# Patient Record
Sex: FEMALE | Race: WHITE | NOT HISPANIC OR LATINO | Employment: OTHER | URBAN - METROPOLITAN AREA
[De-identification: names, ages, dates, MRNs, and addresses within clinical notes are randomized per-mention and may not be internally consistent; named-entity substitution may affect disease eponyms.]

---

## 2023-04-11 ENCOUNTER — OFFICE VISIT (OUTPATIENT)
Dept: PRIMARY CARE | Facility: CLINIC | Age: 88
End: 2023-04-11
Payer: COMMERCIAL

## 2023-04-11 VITALS
HEIGHT: 59 IN | DIASTOLIC BLOOD PRESSURE: 68 MMHG | TEMPERATURE: 98.3 F | SYSTOLIC BLOOD PRESSURE: 138 MMHG | HEART RATE: 74 BPM | RESPIRATION RATE: 20 BRPM | OXYGEN SATURATION: 95 % | WEIGHT: 141 LBS | BODY MASS INDEX: 28.43 KG/M2

## 2023-04-11 DIAGNOSIS — J30.2 SEASONAL ALLERGIC RHINITIS, UNSPECIFIED TRIGGER: Primary | ICD-10-CM

## 2023-04-11 DIAGNOSIS — L29.9 PRURITIC CONDITION: ICD-10-CM

## 2023-04-11 DIAGNOSIS — E11.59 HYPERTENSION ASSOCIATED WITH TYPE 2 DIABETES MELLITUS (MULTI): ICD-10-CM

## 2023-04-11 DIAGNOSIS — I15.2 HYPERTENSION ASSOCIATED WITH TYPE 2 DIABETES MELLITUS (MULTI): ICD-10-CM

## 2023-04-11 PROBLEM — H35.3211 WET AGE-RELATED MACULAR DEGENERATION OF RIGHT EYE WITH ACTIVE CHOROIDAL NEOVASCULARIZATION (MULTI): Status: ACTIVE | Noted: 2023-04-11

## 2023-04-11 PROBLEM — M19.049 ARTHRITIS OF FINGER: Status: ACTIVE | Noted: 2023-04-11

## 2023-04-11 PROBLEM — M25.572 ANKLE PAIN, LEFT: Status: ACTIVE | Noted: 2023-04-11

## 2023-04-11 PROBLEM — D64.9 ANEMIA: Status: ACTIVE | Noted: 2023-04-11

## 2023-04-11 PROBLEM — R19.8 CHANGE IN BOWEL MOVEMENT: Status: ACTIVE | Noted: 2023-04-11

## 2023-04-11 PROBLEM — H35.30 AGE-RELATED MACULAR DEGENERATION: Status: ACTIVE | Noted: 2023-04-11

## 2023-04-11 PROBLEM — H25.012 CORTICAL AGE-RELATED CATARACT OF LEFT EYE: Status: ACTIVE | Noted: 2023-04-11

## 2023-04-11 PROBLEM — H92.09 EAR PAIN: Status: ACTIVE | Noted: 2023-04-11

## 2023-04-11 PROBLEM — H43.22 ASTEROID HYALOSIS OF LEFT EYE: Status: ACTIVE | Noted: 2023-04-11

## 2023-04-11 PROBLEM — E55.9 VITAMIN D DEFICIENCY: Status: ACTIVE | Noted: 2023-04-11

## 2023-04-11 PROBLEM — E04.2 NONTOXIC MULTINODULAR GOITER: Status: ACTIVE | Noted: 2023-04-11

## 2023-04-11 PROBLEM — R73.9 HYPERGLYCEMIA: Status: ACTIVE | Noted: 2023-04-11

## 2023-04-11 PROBLEM — H61.23 IMPACTED CERUMEN OF BOTH EARS: Status: ACTIVE | Noted: 2023-04-11

## 2023-04-11 PROBLEM — L30.9 DERMATITIS OF EXTERNAL EAR: Status: ACTIVE | Noted: 2023-04-11

## 2023-04-11 PROBLEM — R10.9 ABDOMINAL PAIN OF MULTIPLE SITES: Status: ACTIVE | Noted: 2023-04-11

## 2023-04-11 PROBLEM — E83.52 HYPERCALCEMIA: Status: ACTIVE | Noted: 2023-04-11

## 2023-04-11 PROBLEM — Z96.1 PSEUDOPHAKIA OF BOTH EYES: Status: ACTIVE | Noted: 2023-04-11

## 2023-04-11 PROBLEM — K80.20 CHOLELITHIASIS: Status: ACTIVE | Noted: 2023-04-11

## 2023-04-11 PROBLEM — H04.123 DRY EYES, BILATERAL: Status: ACTIVE | Noted: 2023-04-11

## 2023-04-11 PROBLEM — M35.3 POLYMYALGIA RHEUMATICA (MULTI): Status: ACTIVE | Noted: 2023-04-11

## 2023-04-11 PROBLEM — J20.9 ACUTE BRONCHITIS WITH BRONCHOSPASM: Status: ACTIVE | Noted: 2023-04-11

## 2023-04-11 PROBLEM — H52.4 BILATERAL PRESBYOPIA: Status: ACTIVE | Noted: 2023-04-11

## 2023-04-11 PROBLEM — I50.32 CHRONIC DIASTOLIC CONGESTIVE HEART FAILURE (MULTI): Status: ACTIVE | Noted: 2023-04-11

## 2023-04-11 PROBLEM — R31.9 HEMATURIA: Status: ACTIVE | Noted: 2023-04-11

## 2023-04-11 PROBLEM — R60.9 EDEMA: Status: ACTIVE | Noted: 2023-04-11

## 2023-04-11 PROBLEM — H02.831 DERMATOCHALASIS OF BOTH UPPER EYELIDS: Status: ACTIVE | Noted: 2023-04-11

## 2023-04-11 PROBLEM — H26.491 PCO (POSTERIOR CAPSULAR OPACIFICATION), RIGHT: Status: ACTIVE | Noted: 2023-04-11

## 2023-04-11 PROBLEM — H25.11 AGE-RELATED NUCLEAR CATARACT, RIGHT: Status: ACTIVE | Noted: 2023-04-11

## 2023-04-11 PROBLEM — G89.4 CHRONIC PAIN SYNDROME: Status: ACTIVE | Noted: 2023-04-11

## 2023-04-11 PROBLEM — H52.203 ASTIGMATISM OF BOTH EYES WITH PRESBYOPIA: Status: ACTIVE | Noted: 2023-04-11

## 2023-04-11 PROBLEM — E21.0 PRIMARY HYPERPARATHYROIDISM (MULTI): Status: ACTIVE | Noted: 2023-04-11

## 2023-04-11 PROBLEM — M79.7 FIBROMYALGIA: Status: ACTIVE | Noted: 2023-04-11

## 2023-04-11 PROBLEM — I10 HYPERTENSION: Status: ACTIVE | Noted: 2023-04-11

## 2023-04-11 PROBLEM — M81.0 OSTEOPOROSIS: Status: ACTIVE | Noted: 2023-04-11

## 2023-04-11 PROBLEM — I49.9 ARRHYTHMIA: Status: ACTIVE | Noted: 2023-04-11

## 2023-04-11 PROBLEM — R63.0 APPETITE LOSS: Status: ACTIVE | Noted: 2023-04-11

## 2023-04-11 PROBLEM — D50.9 ANEMIA, IRON DEFICIENCY: Status: ACTIVE | Noted: 2023-04-11

## 2023-04-11 PROBLEM — J01.90 ACUTE SINUS INFECTION: Status: ACTIVE | Noted: 2023-04-11

## 2023-04-11 PROBLEM — H60.311 ACUTE DIFFUSE OTITIS EXTERNA OF RIGHT EAR: Status: ACTIVE | Noted: 2023-04-11

## 2023-04-11 PROBLEM — H25.12 AGE-RELATED NUCLEAR CATARACT, LEFT: Status: ACTIVE | Noted: 2023-04-11

## 2023-04-11 PROBLEM — M79.642 PAIN OF LEFT HAND: Status: ACTIVE | Noted: 2023-04-11

## 2023-04-11 PROBLEM — H52.4 ASTIGMATISM OF BOTH EYES WITH PRESBYOPIA: Status: ACTIVE | Noted: 2023-04-11

## 2023-04-11 PROBLEM — H26.492 PCO (POSTERIOR CAPSULAR OPACIFICATION), LEFT: Status: ACTIVE | Noted: 2023-04-11

## 2023-04-11 PROBLEM — K44.9 HIATAL HERNIA: Status: ACTIVE | Noted: 2023-04-11

## 2023-04-11 PROBLEM — H25.011 CORTICAL AGE-RELATED CATARACT OF RIGHT EYE: Status: ACTIVE | Noted: 2023-04-11

## 2023-04-11 PROBLEM — E66.9 OBESITY (BMI 30-39.9): Status: ACTIVE | Noted: 2023-04-11

## 2023-04-11 PROBLEM — S63.201A: Status: ACTIVE | Noted: 2023-04-11

## 2023-04-11 PROBLEM — K86.2 PANCREATIC CYST (HHS-HCC): Status: ACTIVE | Noted: 2023-04-11

## 2023-04-11 PROBLEM — H02.834 DERMATOCHALASIS OF BOTH UPPER EYELIDS: Status: ACTIVE | Noted: 2023-04-11

## 2023-04-11 PROBLEM — I48.91 ATRIAL FIBRILLATION (MULTI): Status: ACTIVE | Noted: 2023-04-11

## 2023-04-11 PROBLEM — E78.5 HYPERLIPIDEMIA: Status: ACTIVE | Noted: 2023-04-11

## 2023-04-11 PROBLEM — H35.3134 ADVANCED ATROPHIC NONEXUDATIVE AGE-RELATED MACULAR DEGENERATION OF BOTH EYES WITH SUBFOVEAL INVOLVEMENT: Status: ACTIVE | Noted: 2023-04-11

## 2023-04-11 PROBLEM — H04.209 EYE TEARING: Status: ACTIVE | Noted: 2023-04-11

## 2023-04-11 PROCEDURE — 1036F TOBACCO NON-USER: CPT | Performed by: FAMILY MEDICINE

## 2023-04-11 PROCEDURE — 3078F DIAST BP <80 MM HG: CPT | Performed by: FAMILY MEDICINE

## 2023-04-11 PROCEDURE — 3075F SYST BP GE 130 - 139MM HG: CPT | Performed by: FAMILY MEDICINE

## 2023-04-11 PROCEDURE — 99213 OFFICE O/P EST LOW 20 MIN: CPT | Performed by: FAMILY MEDICINE

## 2023-04-11 PROCEDURE — 96372 THER/PROPH/DIAG INJ SC/IM: CPT | Performed by: FAMILY MEDICINE

## 2023-04-11 PROCEDURE — 1159F MED LIST DOCD IN RCRD: CPT | Performed by: FAMILY MEDICINE

## 2023-04-11 PROCEDURE — 1125F AMNT PAIN NOTED PAIN PRSNT: CPT | Performed by: FAMILY MEDICINE

## 2023-04-11 PROCEDURE — 1160F RVW MEDS BY RX/DR IN RCRD: CPT | Performed by: FAMILY MEDICINE

## 2023-04-11 PROCEDURE — 1157F ADVNC CARE PLAN IN RCRD: CPT | Performed by: FAMILY MEDICINE

## 2023-04-11 RX ORDER — TRIAMCINOLONE ACETONIDE 40 MG/ML
40 INJECTION, SUSPENSION INTRA-ARTICULAR; INTRAMUSCULAR ONCE
Status: COMPLETED | OUTPATIENT
Start: 2023-04-11 | End: 2023-04-11

## 2023-04-11 RX ORDER — VITS A,C,E/LUTEIN/MINERALS 300MCG-200
1 TABLET ORAL DAILY
COMMUNITY

## 2023-04-11 RX ORDER — ROSUVASTATIN CALCIUM 5 MG/1
1 TABLET, COATED ORAL DAILY
COMMUNITY
Start: 2013-11-05 | End: 2023-12-12 | Stop reason: SDUPTHER

## 2023-04-11 RX ORDER — VIT C/E/ZN/COPPR/LUTEIN/ZEAXAN 250MG-90MG
1 CAPSULE ORAL DAILY
COMMUNITY
Start: 2014-11-11

## 2023-04-11 RX ORDER — RIVAROXABAN 15 MG/1
15 TABLET, FILM COATED ORAL
COMMUNITY
Start: 2014-05-14 | End: 2024-05-13

## 2023-04-11 RX ORDER — SPIRONOLACTONE 25 MG/1
1 TABLET ORAL DAILY
COMMUNITY
Start: 2018-04-17 | End: 2023-12-12 | Stop reason: SDUPTHER

## 2023-04-11 RX ORDER — METHYLPREDNISOLONE 4 MG/1
TABLET ORAL
Qty: 21 TABLET | Refills: 0 | Status: SHIPPED | OUTPATIENT
Start: 2023-04-12 | End: 2023-04-18

## 2023-04-11 RX ORDER — ACETAMINOPHEN 500 MG
2 TABLET ORAL DAILY PRN
COMMUNITY
Start: 2014-05-13 | End: 2024-05-28

## 2023-04-11 RX ORDER — OMEPRAZOLE 20 MG/1
1 CAPSULE, DELAYED RELEASE ORAL DAILY
COMMUNITY
End: 2023-10-23 | Stop reason: WASHOUT

## 2023-04-11 RX ORDER — AMLODIPINE BESYLATE 5 MG/1
1 TABLET ORAL DAILY
COMMUNITY
Start: 2015-09-18 | End: 2023-12-12 | Stop reason: ALTCHOICE

## 2023-04-11 RX ORDER — KETOTIFEN FUMARATE 0.35 MG/ML
SOLUTION/ DROPS OPHTHALMIC
COMMUNITY
Start: 2018-07-25

## 2023-04-11 RX ORDER — FUROSEMIDE 40 MG/1
1 TABLET ORAL 2 TIMES DAILY
COMMUNITY
Start: 2016-10-04 | End: 2023-11-29

## 2023-04-11 RX ADMIN — TRIAMCINOLONE ACETONIDE 40 MG: 40 INJECTION, SUSPENSION INTRA-ARTICULAR; INTRAMUSCULAR at 10:17

## 2023-04-11 ASSESSMENT — ENCOUNTER SYMPTOMS
ABDOMINAL DISTENTION: 0
SINUS PRESSURE: 1
DEPRESSION: 0
SHORTNESS OF BREATH: 0
CHILLS: 0
OCCASIONAL FEELINGS OF UNSTEADINESS: 0
MYALGIAS: 0
ABDOMINAL PAIN: 0
NAUSEA: 0
FEVER: 0
ARTHRALGIAS: 0
APPETITE CHANGE: 0
FATIGUE: 0
COUGH: 1
SORE THROAT: 0
DYSURIA: 0
DYSPHORIC MOOD: 0
WHEEZING: 0
ADENOPATHY: 0
HEADACHES: 0
DIFFICULTY URINATING: 0
LOSS OF SENSATION IN FEET: 0
CHEST TIGHTNESS: 0
NERVOUS/ANXIOUS: 0
BRUISES/BLEEDS EASILY: 0
SLEEP DISTURBANCE: 0
LIGHT-HEADEDNESS: 0
DIARRHEA: 0

## 2023-04-11 NOTE — PROGRESS NOTES
"Subjective   Patient ID: Aicha Lord is a 89 y.o. female who presents for Cough.    HPI     Review of Systems    Objective   /68   Pulse 74   Temp 36.8 °C (98.3 °F)   Resp 20   Ht 1.499 m (4' 11\")   Wt 64 kg (141 lb)   SpO2 95%   BMI 28.48 kg/m²     Physical Exam    Assessment/Plan          "

## 2023-04-11 NOTE — PROGRESS NOTES
"Subjective   Patient ID: Aicha Lord is a 89 y.o. female who presents for Cough.  Pt has sinus congestion, nasal drainage and productive cough . Onset was 11 days ago. Denies fever or SOB. Tested neg for COVID 2 days ago.    Pt reports symptoms are worsening.   Pt has not tried anything for treatment without improvement.   She has hx diabetes and last A1c 5.7 in December    Cough  Associated symptoms include postnasal drip. Pertinent negatives include no chest pain, chills, ear pain, fever, headaches, myalgias, rash, sore throat, shortness of breath or wheezing.       Review of Systems   Constitutional:  Negative for appetite change, chills, fatigue and fever.   HENT:  Positive for congestion, postnasal drip, sinus pressure and sneezing. Negative for ear pain and sore throat.    Eyes:  Negative for visual disturbance.   Respiratory:  Positive for cough. Negative for chest tightness, shortness of breath and wheezing.    Cardiovascular:  Negative for chest pain.   Gastrointestinal:  Negative for abdominal distention, abdominal pain, diarrhea and nausea.   Genitourinary:  Negative for difficulty urinating, dysuria and pelvic pain.   Musculoskeletal:  Negative for arthralgias and myalgias.   Skin:  Negative for rash.   Allergic/Immunologic: Negative for immunocompromised state.   Neurological:  Negative for light-headedness and headaches.   Hematological:  Negative for adenopathy. Does not bruise/bleed easily.   Psychiatric/Behavioral:  Negative for dysphoric mood and sleep disturbance. The patient is not nervous/anxious.        Objective   /68   Pulse 74   Temp 36.8 °C (98.3 °F)   Resp 20   Ht 1.499 m (4' 11\")   Wt 64 kg (141 lb)   SpO2 95%   BMI 28.48 kg/m²    Physical Exam  Constitutional:       General: She is not in acute distress.     Appearance: Normal appearance.   HENT:      Head: Normocephalic and atraumatic.      Right Ear: Tympanic membrane and ear canal normal.      Left Ear: Tympanic " membrane and ear canal normal.      Nose: Congestion and rhinorrhea present.      Mouth/Throat:      Mouth: Mucous membranes are moist.      Pharynx: No oropharyngeal exudate or posterior oropharyngeal erythema.   Eyes:      Conjunctiva/sclera: Conjunctivae normal.   Cardiovascular:      Rate and Rhythm: Normal rate and regular rhythm.      Heart sounds: Normal heart sounds. No murmur heard.  Pulmonary:      Effort: Pulmonary effort is normal.      Breath sounds: Normal breath sounds.   Abdominal:      Palpations: Abdomen is soft.      Tenderness: There is no abdominal tenderness.   Neurological:      Mental Status: She is alert.   Psychiatric:         Mood and Affect: Mood normal.         Judgment: Judgment normal.           Assessment/Plan   Diagnoses and all orders for this visit:  Seasonal allergic rhinitis, unspecified trigger - with severity of symptoms will treat with Depomedrol shot today then start Medrol dose dimitry tomorrow. Also try nasal saline (Ie. Sinus Rinse) every 1-2 hours as needed.  Hypertension associated with type 2 diabetes mellitus (CMS/[facility])

## 2023-06-08 ENCOUNTER — OFFICE VISIT (OUTPATIENT)
Dept: PRIMARY CARE | Facility: CLINIC | Age: 88
End: 2023-06-08
Payer: MEDICARE

## 2023-06-08 VITALS
HEART RATE: 72 BPM | HEIGHT: 59 IN | BODY MASS INDEX: 27.62 KG/M2 | RESPIRATION RATE: 16 BRPM | DIASTOLIC BLOOD PRESSURE: 74 MMHG | SYSTOLIC BLOOD PRESSURE: 118 MMHG | WEIGHT: 137 LBS | OXYGEN SATURATION: 99 %

## 2023-06-08 DIAGNOSIS — I15.2 HYPERTENSION ASSOCIATED WITH TYPE 2 DIABETES MELLITUS (MULTI): ICD-10-CM

## 2023-06-08 DIAGNOSIS — M79.7 FIBROMYALGIA: ICD-10-CM

## 2023-06-08 DIAGNOSIS — N18.30 TYPE 2 DIABETES MELLITUS WITH STAGE 3 CHRONIC KIDNEY DISEASE AND HYPERTENSION (MULTI): ICD-10-CM

## 2023-06-08 DIAGNOSIS — I12.9 TYPE 2 DIABETES MELLITUS WITH STAGE 3 CHRONIC KIDNEY DISEASE AND HYPERTENSION (MULTI): ICD-10-CM

## 2023-06-08 DIAGNOSIS — Z00.00 HEALTHCARE MAINTENANCE: Primary | ICD-10-CM

## 2023-06-08 DIAGNOSIS — E11.59 HYPERTENSION ASSOCIATED WITH TYPE 2 DIABETES MELLITUS (MULTI): ICD-10-CM

## 2023-06-08 DIAGNOSIS — D50.9 IRON DEFICIENCY ANEMIA, UNSPECIFIED IRON DEFICIENCY ANEMIA TYPE: ICD-10-CM

## 2023-06-08 DIAGNOSIS — I50.32 CHRONIC DIASTOLIC CONGESTIVE HEART FAILURE (MULTI): ICD-10-CM

## 2023-06-08 DIAGNOSIS — E04.2 NONTOXIC MULTINODULAR GOITER: ICD-10-CM

## 2023-06-08 DIAGNOSIS — N18.32 STAGE 3B CHRONIC KIDNEY DISEASE (MULTI): ICD-10-CM

## 2023-06-08 DIAGNOSIS — G89.4 CHRONIC PAIN SYNDROME: ICD-10-CM

## 2023-06-08 DIAGNOSIS — E78.2 MIXED HYPERLIPIDEMIA: ICD-10-CM

## 2023-06-08 DIAGNOSIS — E21.0 PRIMARY HYPERPARATHYROIDISM (MULTI): ICD-10-CM

## 2023-06-08 DIAGNOSIS — I10 PRIMARY HYPERTENSION: ICD-10-CM

## 2023-06-08 DIAGNOSIS — M35.3 POLYMYALGIA RHEUMATICA (MULTI): ICD-10-CM

## 2023-06-08 DIAGNOSIS — E11.22 TYPE 2 DIABETES MELLITUS WITH STAGE 3 CHRONIC KIDNEY DISEASE AND HYPERTENSION (MULTI): ICD-10-CM

## 2023-06-08 DIAGNOSIS — I48.11 LONGSTANDING PERSISTENT ATRIAL FIBRILLATION (MULTI): ICD-10-CM

## 2023-06-08 PROBLEM — I49.9 ARRHYTHMIA: Status: RESOLVED | Noted: 2023-04-11 | Resolved: 2023-06-08

## 2023-06-08 PROBLEM — H25.011 CORTICAL AGE-RELATED CATARACT OF RIGHT EYE: Status: RESOLVED | Noted: 2023-04-11 | Resolved: 2023-06-08

## 2023-06-08 PROBLEM — H25.12 AGE-RELATED NUCLEAR CATARACT, LEFT: Status: RESOLVED | Noted: 2023-04-11 | Resolved: 2023-06-08

## 2023-06-08 PROBLEM — R63.0 APPETITE LOSS: Status: RESOLVED | Noted: 2023-04-11 | Resolved: 2023-06-08

## 2023-06-08 PROBLEM — R00.2 PALPITATIONS: Status: ACTIVE | Noted: 2023-06-08

## 2023-06-08 PROBLEM — R19.8 CHANGE IN BOWEL MOVEMENT: Status: RESOLVED | Noted: 2023-04-11 | Resolved: 2023-06-08

## 2023-06-08 PROBLEM — R10.9 ABDOMINAL PAIN OF MULTIPLE SITES: Status: RESOLVED | Noted: 2023-04-11 | Resolved: 2023-06-08

## 2023-06-08 PROBLEM — H60.311 ACUTE DIFFUSE OTITIS EXTERNA OF RIGHT EAR: Status: RESOLVED | Noted: 2023-04-11 | Resolved: 2023-06-08

## 2023-06-08 PROBLEM — H92.09 EAR PAIN: Status: RESOLVED | Noted: 2023-04-11 | Resolved: 2023-06-08

## 2023-06-08 PROBLEM — M79.642 PAIN OF LEFT HAND: Status: RESOLVED | Noted: 2023-04-11 | Resolved: 2023-06-08

## 2023-06-08 PROBLEM — J20.9 ACUTE BRONCHITIS WITH BRONCHOSPASM: Status: RESOLVED | Noted: 2023-04-11 | Resolved: 2023-06-08

## 2023-06-08 PROBLEM — H25.012 CORTICAL AGE-RELATED CATARACT OF LEFT EYE: Status: RESOLVED | Noted: 2023-04-11 | Resolved: 2023-06-08

## 2023-06-08 PROBLEM — R60.9 EDEMA: Status: RESOLVED | Noted: 2023-04-11 | Resolved: 2023-06-08

## 2023-06-08 PROBLEM — H25.11 AGE-RELATED NUCLEAR CATARACT, RIGHT: Status: RESOLVED | Noted: 2023-04-11 | Resolved: 2023-06-08

## 2023-06-08 PROBLEM — R00.2 PALPITATIONS: Status: RESOLVED | Noted: 2023-06-08 | Resolved: 2023-06-08

## 2023-06-08 PROBLEM — L30.9 DERMATITIS OF EXTERNAL EAR: Status: RESOLVED | Noted: 2023-04-11 | Resolved: 2023-06-08

## 2023-06-08 PROBLEM — S63.201A: Status: RESOLVED | Noted: 2023-04-11 | Resolved: 2023-06-08

## 2023-06-08 PROBLEM — E66.9 OBESITY (BMI 30-39.9): Status: RESOLVED | Noted: 2023-04-11 | Resolved: 2023-06-08

## 2023-06-08 PROBLEM — D64.9 ANEMIA: Status: RESOLVED | Noted: 2023-04-11 | Resolved: 2023-06-08

## 2023-06-08 PROBLEM — R73.9 HYPERGLYCEMIA: Status: RESOLVED | Noted: 2023-04-11 | Resolved: 2023-06-08

## 2023-06-08 PROBLEM — J01.90 ACUTE SINUS INFECTION: Status: RESOLVED | Noted: 2023-04-11 | Resolved: 2023-06-08

## 2023-06-08 PROCEDURE — 3074F SYST BP LT 130 MM HG: CPT | Performed by: FAMILY MEDICINE

## 2023-06-08 PROCEDURE — 1157F ADVNC CARE PLAN IN RCRD: CPT | Performed by: FAMILY MEDICINE

## 2023-06-08 PROCEDURE — G0439 PPPS, SUBSEQ VISIT: HCPCS | Performed by: FAMILY MEDICINE

## 2023-06-08 PROCEDURE — 1160F RVW MEDS BY RX/DR IN RCRD: CPT | Performed by: FAMILY MEDICINE

## 2023-06-08 PROCEDURE — 1170F FXNL STATUS ASSESSED: CPT | Performed by: FAMILY MEDICINE

## 2023-06-08 PROCEDURE — 1036F TOBACCO NON-USER: CPT | Performed by: FAMILY MEDICINE

## 2023-06-08 PROCEDURE — 3078F DIAST BP <80 MM HG: CPT | Performed by: FAMILY MEDICINE

## 2023-06-08 PROCEDURE — 1159F MED LIST DOCD IN RCRD: CPT | Performed by: FAMILY MEDICINE

## 2023-06-08 PROCEDURE — 99214 OFFICE O/P EST MOD 30 MIN: CPT | Performed by: FAMILY MEDICINE

## 2023-06-08 ASSESSMENT — ENCOUNTER SYMPTOMS
ABDOMINAL PAIN: 0
OCCASIONAL FEELINGS OF UNSTEADINESS: 0
SLEEP DISTURBANCE: 0
SINUS PRESSURE: 0
ABDOMINAL DISTENTION: 0
DYSURIA: 0
CHEST TIGHTNESS: 0
CHILLS: 0
MYALGIAS: 0
BACK PAIN: 1
FATIGUE: 0
WHEEZING: 0
NERVOUS/ANXIOUS: 0
ADENOPATHY: 0
DIFFICULTY URINATING: 0
ARTHRALGIAS: 1
DEPRESSION: 0
BRUISES/BLEEDS EASILY: 0
FEVER: 0
NAUSEA: 0
APPETITE CHANGE: 0
DIARRHEA: 0
DYSPHORIC MOOD: 0
LIGHT-HEADEDNESS: 0
SHORTNESS OF BREATH: 0
LOSS OF SENSATION IN FEET: 0
HEADACHES: 0

## 2023-06-08 ASSESSMENT — PATIENT HEALTH QUESTIONNAIRE - PHQ9
SUM OF ALL RESPONSES TO PHQ9 QUESTIONS 1 AND 2: 0
2. FEELING DOWN, DEPRESSED OR HOPELESS: NOT AT ALL
1. LITTLE INTEREST OR PLEASURE IN DOING THINGS: NOT AT ALL

## 2023-06-08 ASSESSMENT — ACTIVITIES OF DAILY LIVING (ADL)
GROCERY_SHOPPING: NEEDS ASSISTANCE
DOING_HOUSEWORK: INDEPENDENT
MANAGING_FINANCES: INDEPENDENT
TAKING_MEDICATION: INDEPENDENT
BATHING: INDEPENDENT
DRESSING: INDEPENDENT

## 2023-06-08 NOTE — PATIENT INSTRUCTIONS

## 2023-06-08 NOTE — PROGRESS NOTES
Subjective   Patient ID: Aicha Lord is a 89 y.o. female who presents for Medicare Annual Wellness Visit Subsequent.  PMHX, PSHx, Fam hx, and Social hx reviewed.   New concerns- doing injx for wet macular degeneration, still has chronic back hip and leg pain for which Tylenol helps.   Vaccines Shingles vaccines due, declined  Dentist seen at least yearly yes  Vision concerns yes as above  Hearing concerns none  Diet is usually overall healthy.   Smoker - no  Alcohol use - none  Exercising 0 days per week.   She has hx breast cancer, declines mammogram  Colonoscopy declines    Pt is here for f/u Type 2 diabetes.   Pt is usually following low carb diet, and is exercising 0 days per week.  Taking no medication.   1 x daily accucheks . Fasting readings are in 100s range. No low blood sugar readings have been encountered.  A1c 5.8  Eye exam is current  Pt does not  have foot pain, paresthesias, or numbness in feet. Foot checks daily - yes.  Following with podiatry -  no.  Denies vision changes, abdominal pain, excessive thirst, frequent urination.    Pt has chronic HTN, Afib, CMP. Sees Dr Caicedo.  Pt is taking Xarelto, Aldactone, Lasix, and Norvasc. Tolerating well.  Exercising 0 days per week   Low sodium diet is usually being followed.   Is not monitoring home blood pressures.  Denies HA, vision changes or CP.      Pt has Dyslipidemia.   Lipid panel showed LDL 94.  Currently taking Crestor and is tolerating well without muscle pains or weakness.     Pt has CRI. It is worse compared to previous labs with GFR 37 on December labs.        Medicare Wellness Billing Compliance Satisfied    Review all medications by prescribing practitioner or clinical pharmacist (such as prescriptions, OTCs, herbal therapies and supplements) documented in the medical record    Past Medical, Surgical, and Family History reviewed and updated in chart    Tobacco Use Reviewed    Alcohol Use Reviewed    Illicit Drug Use Reviewed    PHQ2/9  "   Falls in Last Year Reviewed    Home Safety Risk Factors Reviewed    Cognitive Impairment Reviewed    Patient Self Assessment and Health Status    Current Diet Reviewed    Exercise Frequency    ADL - Hearing Impairment    ADL - Bathing    ADL - Dressing    ADL - Walks in Home    IADL - Managing Finances    IADL - Grocery Shopping    IADL - Taking Medications    IADL - Doing Housework        Review of Systems   Constitutional:  Negative for appetite change, chills, fatigue and fever.   HENT:  Negative for congestion, ear pain and sinus pressure.    Eyes:  Negative for visual disturbance.   Respiratory:  Negative for chest tightness, shortness of breath and wheezing.    Cardiovascular:  Positive for leg swelling (improved). Negative for chest pain.   Gastrointestinal:  Negative for abdominal distention, abdominal pain, diarrhea and nausea.   Genitourinary:  Negative for difficulty urinating, dysuria and pelvic pain.   Musculoskeletal:  Positive for arthralgias and back pain. Negative for myalgias.   Skin:  Negative for rash.   Allergic/Immunologic: Negative for immunocompromised state.   Neurological:  Negative for light-headedness and headaches.   Hematological:  Negative for adenopathy. Does not bruise/bleed easily.   Psychiatric/Behavioral:  Negative for dysphoric mood and sleep disturbance. The patient is not nervous/anxious.        Objective   /74   Pulse 72   Resp 16   Ht 1.499 m (4' 11\")   Wt 62.1 kg (137 lb)   SpO2 99%   BMI 27.67 kg/m²    Physical Exam  Constitutional:       General: She is not in acute distress.     Appearance: Normal appearance. She is not ill-appearing.   HENT:      Head: Normocephalic and atraumatic.      Right Ear: Tympanic membrane, ear canal and external ear normal.      Left Ear: Tympanic membrane, ear canal and external ear normal.      Nose: Nose normal.      Mouth/Throat:      Mouth: Mucous membranes are moist.      Pharynx: No oropharyngeal exudate or " posterior oropharyngeal erythema.   Eyes:      Extraocular Movements: Extraocular movements intact.      Conjunctiva/sclera: Conjunctivae normal.      Pupils: Pupils are equal, round, and reactive to light.   Neck:      Vascular: No carotid bruit.   Cardiovascular:      Rate and Rhythm: Normal rate and regular rhythm.      Heart sounds: Normal heart sounds. No murmur heard.  Pulmonary:      Breath sounds: Normal breath sounds. No wheezing, rhonchi or rales.   Abdominal:      General: Bowel sounds are normal. There is no distension.      Palpations: Abdomen is soft. There is no mass.      Tenderness: There is no abdominal tenderness.   Musculoskeletal:         General: No swelling or deformity.      Cervical back: Neck supple. No tenderness.   Lymphadenopathy:      Cervical: No cervical adenopathy.   Skin:     General: Skin is warm and dry.      Findings: No lesion or rash.   Neurological:      Mental Status: She is alert and oriented to person, place, and time.      Sensory: No sensory deficit.      Motor: No weakness.      Coordination: Coordination normal.      Deep Tendon Reflexes: Reflexes normal.   Psychiatric:         Mood and Affect: Mood normal.         Behavior: Behavior normal.         Judgment: Judgment normal.           Assessment/Plan   Diagnoses and all orders for this visit:  Healthcare maintenance - Shingles vaccines recommended at pharmacy, other vaccines current.  Recheck fasting labs. Mammogram, Colonoscopy declined.  Hypertension associated with type 2 diabetes mellitus (CMS/HCC) - well controlled per last A1c, recheck  Chronic diastolic congestive heart failure (CMS/HCC) - stable, continue to follow with Dr Caicedo  -     TSH with reflex to Free T4 if abnormal; Future  -     Hemoglobin A1C; Future  -     Albumin, urine, random; Future  Primary hypertension - well controlled, continue current meds and monitor  -     Comprehensive Metabolic Panel; Future  -     Urinalysis with Reflex Microscopic;  Future  Longstanding persistent atrial fibrillation (CMS/HCC) - asymptomatic, rate controlled. Doing well on Xarelto  Polymyalgia rheumatica (CMS/HCC)  Chronic pain syndrome  Fibromyalgia  Nontoxic multinodular goiter  Iron deficiency anemia, unspecified iron deficiency anemia type  -     CBC and Auto Differential; Future  -     Iron and TIBC; Future  -     Ferritin; Future  Primary hyperparathyroidism (CMS/HCC)/ Stage 3b chronic kidney disease - GFS lower last check, will recheck with labs. Discussed keeping well hydrated. Continue low sodium diet.   Mixed hyperlipidemia - doing well on statin, continue and monitor  -     Lipid Panel; Future    Follow up in 6months, 30min        No Repair - Repaired With Adjacent Surgical Defect Text (Leave Blank If You Do Not Want): After obtaining clear surgical margins the defect was repaired concurrently with another surgical defect which was in close approximation.

## 2023-06-08 NOTE — PROGRESS NOTES
"Subjective   Reason for Visit: Aicha Lord is an 89 y.o. female here for a Medicare Wellness visit.     Past Medical, Surgical, and Family History reviewed and updated in chart.    Reviewed all medications by prescribing practitioner or clinical pharmacist (such as prescriptions, OTCs, herbal therapies and supplements) and documented in the medical record.    HPI    Patient Care Team:  Reno Alfred MD as PCP - General     Review of Systems    Objective   Vitals:  /74   Pulse 72   Resp 16   Ht 1.499 m (4' 11\")   Wt 62.1 kg (137 lb)   SpO2 99%   BMI 27.67 kg/m²       Physical Exam    Assessment/Plan   Problem List Items Addressed This Visit          Other    Healthcare maintenance - Primary          "

## 2023-06-19 ENCOUNTER — LAB (OUTPATIENT)
Dept: LAB | Facility: LAB | Age: 88
End: 2023-06-19
Payer: COMMERCIAL

## 2023-06-19 DIAGNOSIS — E11.22 TYPE 2 DIABETES MELLITUS WITH STAGE 3 CHRONIC KIDNEY DISEASE AND HYPERTENSION (MULTI): ICD-10-CM

## 2023-06-19 DIAGNOSIS — I10 PRIMARY HYPERTENSION: ICD-10-CM

## 2023-06-19 DIAGNOSIS — N18.30 TYPE 2 DIABETES MELLITUS WITH STAGE 3 CHRONIC KIDNEY DISEASE AND HYPERTENSION (MULTI): ICD-10-CM

## 2023-06-19 DIAGNOSIS — D50.9 IRON DEFICIENCY ANEMIA, UNSPECIFIED IRON DEFICIENCY ANEMIA TYPE: ICD-10-CM

## 2023-06-19 DIAGNOSIS — I50.32 CHRONIC DIASTOLIC CONGESTIVE HEART FAILURE (MULTI): ICD-10-CM

## 2023-06-19 DIAGNOSIS — I12.9 TYPE 2 DIABETES MELLITUS WITH STAGE 3 CHRONIC KIDNEY DISEASE AND HYPERTENSION (MULTI): ICD-10-CM

## 2023-06-19 DIAGNOSIS — E78.2 MIXED HYPERLIPIDEMIA: ICD-10-CM

## 2023-06-19 LAB
ALANINE AMINOTRANSFERASE (SGPT) (U/L) IN SER/PLAS: 16 U/L (ref 7–45)
ALBUMIN (G/DL) IN SER/PLAS: 3.8 G/DL (ref 3.4–5)
ALBUMIN (MG/L) IN URINE: 21.5 MG/L
ALBUMIN/CREATININE (UG/MG) IN URINE: 44.2 UG/MG CRT (ref 0–30)
ALKALINE PHOSPHATASE (U/L) IN SER/PLAS: 57 U/L (ref 33–136)
ANION GAP IN SER/PLAS: 11 MMOL/L (ref 10–20)
APPEARANCE, URINE: CLEAR
ASPARTATE AMINOTRANSFERASE (SGOT) (U/L) IN SER/PLAS: 20 U/L (ref 9–39)
BACTERIA, URINE: ABNORMAL /HPF
BASOPHILS (10*3/UL) IN BLOOD BY AUTOMATED COUNT: 0.04 X10E9/L (ref 0–0.1)
BASOPHILS/100 LEUKOCYTES IN BLOOD BY AUTOMATED COUNT: 0.8 % (ref 0–2)
BILIRUBIN TOTAL (MG/DL) IN SER/PLAS: 0.8 MG/DL (ref 0–1.2)
BILIRUBIN, URINE: NEGATIVE
BLOOD, URINE: NEGATIVE
CALCIUM (MG/DL) IN SER/PLAS: 10.9 MG/DL (ref 8.6–10.6)
CARBON DIOXIDE, TOTAL (MMOL/L) IN SER/PLAS: 28 MMOL/L (ref 21–32)
CHLORIDE (MMOL/L) IN SER/PLAS: 105 MMOL/L (ref 98–107)
CHOLESTEROL (MG/DL) IN SER/PLAS: 174 MG/DL (ref 0–199)
CHOLESTEROL IN HDL (MG/DL) IN SER/PLAS: 53.7 MG/DL
CHOLESTEROL/HDL RATIO: 3.2
COLOR, URINE: YELLOW
CREATININE (MG/DL) IN SER/PLAS: 1.2 MG/DL (ref 0.5–1.05)
CREATININE (MG/DL) IN URINE: 48.6 MG/DL (ref 20–320)
EOSINOPHILS (10*3/UL) IN BLOOD BY AUTOMATED COUNT: 0.06 X10E9/L (ref 0–0.4)
EOSINOPHILS/100 LEUKOCYTES IN BLOOD BY AUTOMATED COUNT: 1.2 % (ref 0–6)
ERYTHROCYTE DISTRIBUTION WIDTH (RATIO) BY AUTOMATED COUNT: 14.5 % (ref 11.5–14.5)
ERYTHROCYTE MEAN CORPUSCULAR HEMOGLOBIN CONCENTRATION (G/DL) BY AUTOMATED: 31.4 G/DL (ref 32–36)
ERYTHROCYTE MEAN CORPUSCULAR VOLUME (FL) BY AUTOMATED COUNT: 94 FL (ref 80–100)
ERYTHROCYTES (10*6/UL) IN BLOOD BY AUTOMATED COUNT: 4.34 X10E12/L (ref 4–5.2)
ESTIMATED AVERAGE GLUCOSE FOR HBA1C: 148 MG/DL
FERRITIN (UG/LL) IN SER/PLAS: 70 UG/L (ref 8–150)
GFR FEMALE: 42 ML/MIN/1.73M2
GLUCOSE (MG/DL) IN SER/PLAS: 114 MG/DL (ref 74–99)
GLUCOSE, URINE: NEGATIVE MG/DL
HEMATOCRIT (%) IN BLOOD BY AUTOMATED COUNT: 40.8 % (ref 36–46)
HEMOGLOBIN (G/DL) IN BLOOD: 12.8 G/DL (ref 12–16)
HEMOGLOBIN A1C/HEMOGLOBIN TOTAL IN BLOOD: 6.8 %
IMMATURE GRANULOCYTES/100 LEUKOCYTES IN BLOOD BY AUTOMATED COUNT: 0.2 % (ref 0–0.9)
IRON (UG/DL) IN SER/PLAS: 108 UG/DL (ref 35–150)
IRON BINDING CAPACITY (UG/DL) IN SER/PLAS: 393 UG/DL (ref 240–445)
IRON SATURATION (%) IN SER/PLAS: 27 % (ref 25–45)
KETONES, URINE: NEGATIVE MG/DL
LDL: 97 MG/DL (ref 0–99)
LEUKOCYTE ESTERASE, URINE: ABNORMAL
LEUKOCYTES (10*3/UL) IN BLOOD BY AUTOMATED COUNT: 4.8 X10E9/L (ref 4.4–11.3)
LYMPHOCYTES (10*3/UL) IN BLOOD BY AUTOMATED COUNT: 1.34 X10E9/L (ref 0.8–3)
LYMPHOCYTES/100 LEUKOCYTES IN BLOOD BY AUTOMATED COUNT: 27.7 % (ref 13–44)
MONOCYTES (10*3/UL) IN BLOOD BY AUTOMATED COUNT: 0.29 X10E9/L (ref 0.05–0.8)
MONOCYTES/100 LEUKOCYTES IN BLOOD BY AUTOMATED COUNT: 6 % (ref 2–10)
NEUTROPHILS (10*3/UL) IN BLOOD BY AUTOMATED COUNT: 3.1 X10E9/L (ref 1.6–5.5)
NEUTROPHILS/100 LEUKOCYTES IN BLOOD BY AUTOMATED COUNT: 64.1 % (ref 40–80)
NITRITE, URINE: NEGATIVE
NRBC (PER 100 WBCS) BY AUTOMATED COUNT: 0 /100 WBC (ref 0–0)
PH, URINE: 7 (ref 5–8)
PLATELETS (10*3/UL) IN BLOOD AUTOMATED COUNT: 181 X10E9/L (ref 150–450)
POTASSIUM (MMOL/L) IN SER/PLAS: 4.9 MMOL/L (ref 3.5–5.3)
PROTEIN TOTAL: 6 G/DL (ref 6.4–8.2)
PROTEIN, URINE: NEGATIVE MG/DL
RBC, URINE: <1 /HPF (ref 0–5)
SODIUM (MMOL/L) IN SER/PLAS: 139 MMOL/L (ref 136–145)
SPECIFIC GRAVITY, URINE: 1.01 (ref 1–1.03)
SQUAMOUS EPITHELIAL CELLS, URINE: 3 /HPF
THYROTROPIN (MIU/L) IN SER/PLAS BY DETECTION LIMIT <= 0.05 MIU/L: 1.8 MIU/L (ref 0.44–3.98)
TRIGLYCERIDE (MG/DL) IN SER/PLAS: 119 MG/DL (ref 0–149)
UREA NITROGEN (MG/DL) IN SER/PLAS: 30 MG/DL (ref 6–23)
UROBILINOGEN, URINE: <2 MG/DL (ref 0–1.9)
VLDL: 24 MG/DL (ref 0–40)
WBC, URINE: 3 /HPF (ref 0–5)

## 2023-06-19 PROCEDURE — 82570 ASSAY OF URINE CREATININE: CPT

## 2023-06-19 PROCEDURE — 81001 URINALYSIS AUTO W/SCOPE: CPT

## 2023-06-19 PROCEDURE — 82728 ASSAY OF FERRITIN: CPT

## 2023-06-19 PROCEDURE — 85025 COMPLETE CBC W/AUTO DIFF WBC: CPT

## 2023-06-19 PROCEDURE — 82043 UR ALBUMIN QUANTITATIVE: CPT

## 2023-06-19 PROCEDURE — 84443 ASSAY THYROID STIM HORMONE: CPT

## 2023-06-19 PROCEDURE — 83540 ASSAY OF IRON: CPT

## 2023-06-19 PROCEDURE — 80053 COMPREHEN METABOLIC PANEL: CPT

## 2023-06-19 PROCEDURE — 83550 IRON BINDING TEST: CPT

## 2023-06-19 PROCEDURE — 80061 LIPID PANEL: CPT

## 2023-06-19 PROCEDURE — 83036 HEMOGLOBIN GLYCOSYLATED A1C: CPT

## 2023-06-19 PROCEDURE — 36415 COLL VENOUS BLD VENIPUNCTURE: CPT

## 2023-06-20 ENCOUNTER — TELEPHONE (OUTPATIENT)
Dept: PRIMARY CARE | Facility: CLINIC | Age: 88
End: 2023-06-20
Payer: COMMERCIAL

## 2023-06-20 NOTE — TELEPHONE ENCOUNTER
----- Message from Reno Alfred MD sent at 6/19/2023  4:21 PM EDT -----  A1c higher but still in controlled diabetic range. Mildly low kidney function is stable. Other labs look good.   ----- Message -----  From: Lab, Background User  Sent: 6/19/2023   3:18 PM EDT  To: Reno Alfred MD

## 2023-09-01 LAB
ANION GAP IN SER/PLAS: 15 MMOL/L (ref 10–20)
CALCIUM (MG/DL) IN SER/PLAS: 11.1 MG/DL (ref 8.6–10.6)
CARBON DIOXIDE, TOTAL (MMOL/L) IN SER/PLAS: 26 MMOL/L (ref 21–32)
CHLORIDE (MMOL/L) IN SER/PLAS: 100 MMOL/L (ref 98–107)
CREATININE (MG/DL) IN SER/PLAS: 1.47 MG/DL (ref 0.5–1.05)
GFR FEMALE: 33 ML/MIN/1.73M2
GLUCOSE (MG/DL) IN SER/PLAS: 126 MG/DL (ref 74–99)
POTASSIUM (MMOL/L) IN SER/PLAS: 4.1 MMOL/L (ref 3.5–5.3)
SODIUM (MMOL/L) IN SER/PLAS: 137 MMOL/L (ref 136–145)
UREA NITROGEN (MG/DL) IN SER/PLAS: 40 MG/DL (ref 6–23)

## 2023-10-11 ENCOUNTER — LAB (OUTPATIENT)
Dept: LAB | Facility: LAB | Age: 88
End: 2023-10-11
Payer: COMMERCIAL

## 2023-10-11 DIAGNOSIS — I50.32 CHRONIC DIASTOLIC (CONGESTIVE) HEART FAILURE (MULTI): Primary | ICD-10-CM

## 2023-10-11 DIAGNOSIS — I50.32 CHRONIC DIASTOLIC (CONGESTIVE) HEART FAILURE (MULTI): ICD-10-CM

## 2023-10-11 LAB
ANION GAP SERPL CALC-SCNC: 15 MMOL/L (ref 10–20)
BUN SERPL-MCNC: 26 MG/DL (ref 6–23)
CALCIUM SERPL-MCNC: 10.6 MG/DL (ref 8.6–10.6)
CHLORIDE SERPL-SCNC: 105 MMOL/L (ref 98–107)
CO2 SERPL-SCNC: 24 MMOL/L (ref 21–32)
CREAT SERPL-MCNC: 1.17 MG/DL (ref 0.5–1.05)
GFR SERPL CREATININE-BSD FRML MDRD: 44 ML/MIN/1.73M*2
GLUCOSE SERPL-MCNC: 117 MG/DL (ref 74–99)
POTASSIUM SERPL-SCNC: 4.6 MMOL/L (ref 3.5–5.3)
SODIUM SERPL-SCNC: 139 MMOL/L (ref 136–145)

## 2023-10-11 PROCEDURE — 80048 BASIC METABOLIC PNL TOTAL CA: CPT

## 2023-10-11 PROCEDURE — 36415 COLL VENOUS BLD VENIPUNCTURE: CPT

## 2023-10-17 ENCOUNTER — OFFICE VISIT (OUTPATIENT)
Dept: OPHTHALMOLOGY | Facility: CLINIC | Age: 88
End: 2023-10-17
Payer: COMMERCIAL

## 2023-10-17 DIAGNOSIS — H52.223 REGULAR ASTIGMATISM OF BOTH EYES: ICD-10-CM

## 2023-10-17 DIAGNOSIS — H35.3134 ADVANCED ATROPHIC NONEXUDATIVE AGE-RELATED MACULAR DEGENERATION OF BOTH EYES WITH SUBFOVEAL INVOLVEMENT: Primary | ICD-10-CM

## 2023-10-17 PROCEDURE — 92012 INTRM OPH EXAM EST PATIENT: CPT | Performed by: OPHTHALMOLOGY

## 2023-10-17 PROCEDURE — 92015 DETERMINE REFRACTIVE STATE: CPT | Performed by: OPHTHALMOLOGY

## 2023-10-17 ASSESSMENT — REFRACTION_MANIFEST
OD_CYLINDER: -2.00
OD_SPHERE: +0.50
OS_SPHERE: +1.25
OS_SPHERE: +1.25
OD_CYLINDER: -2.00
METHOD_AUTOREFRACTION: 1
OS_AXIS: 080
OS_ADD: +3.00
OD_ADD: +3.00
OS_AXIS: 080
OS_CYLINDER: -1.75
OS_CYLINDER: -1.75
OD_AXIS: 095
OD_SPHERE: +0.50
OD_AXIS: 095

## 2023-10-17 ASSESSMENT — ENCOUNTER SYMPTOMS
HEMATOLOGIC/LYMPHATIC NEGATIVE: 0
EYES NEGATIVE: 0
CARDIOVASCULAR NEGATIVE: 0
RESPIRATORY NEGATIVE: 0
ENDOCRINE NEGATIVE: 0
CONSTITUTIONAL NEGATIVE: 0
MUSCULOSKELETAL NEGATIVE: 0
GASTROINTESTINAL NEGATIVE: 0
ALLERGIC/IMMUNOLOGIC NEGATIVE: 0
PSYCHIATRIC NEGATIVE: 0
NEUROLOGICAL NEGATIVE: 0

## 2023-10-17 ASSESSMENT — REFRACTION_WEARINGRX
OS_ADD: +2.75
OD_ADD: +2.75
OD_CYLINDER: -0.75
OS_CYLINDER: -1.75
SPECS_TYPE: PAL
OD_AXIS: 090
OD_SPHERE: +0.50
OS_SPHERE: +1.00
OS_AXIS: 085

## 2023-10-17 ASSESSMENT — VISUAL ACUITY
OS_CC+: +2
CORRECTION_TYPE: GLASSES
METHOD: SNELLEN - LINEAR
OS_CC: 20/50
OS_PH_CC: 20/40
OD_CC: 20/50

## 2023-10-17 ASSESSMENT — TONOMETRY
OS_IOP_MMHG: 13
OD_IOP_MMHG: 15
IOP_METHOD: TONOPEN

## 2023-10-17 ASSESSMENT — SLIT LAMP EXAM - LIDS
COMMENTS: NORMAL
COMMENTS: NORMAL

## 2023-10-17 ASSESSMENT — EXTERNAL EXAM - RIGHT EYE: OD_EXAM: NORMAL

## 2023-10-17 ASSESSMENT — EXTERNAL EXAM - LEFT EYE: OS_EXAM: NORMAL

## 2023-10-17 NOTE — PROGRESS NOTES
Patient of Dr. Jacinto  Age-related macular degeneration (AMD) both eyes (OU), here for glasses check    New Mrx given

## 2023-10-19 ENCOUNTER — APPOINTMENT (OUTPATIENT)
Dept: CARDIOLOGY | Facility: HOSPITAL | Age: 88
End: 2023-10-19
Payer: COMMERCIAL

## 2023-10-23 ENCOUNTER — OFFICE VISIT (OUTPATIENT)
Dept: CARDIOLOGY | Facility: CLINIC | Age: 88
End: 2023-10-23
Payer: COMMERCIAL

## 2023-10-23 VITALS
TEMPERATURE: 98 F | WEIGHT: 136.6 LBS | HEART RATE: 69 BPM | SYSTOLIC BLOOD PRESSURE: 142 MMHG | HEIGHT: 59 IN | BODY MASS INDEX: 27.54 KG/M2 | DIASTOLIC BLOOD PRESSURE: 67 MMHG

## 2023-10-23 DIAGNOSIS — I15.2 HYPERTENSION ASSOCIATED WITH TYPE 2 DIABETES MELLITUS (MULTI): ICD-10-CM

## 2023-10-23 DIAGNOSIS — E78.2 MIXED HYPERLIPIDEMIA: ICD-10-CM

## 2023-10-23 DIAGNOSIS — E11.59 HYPERTENSION ASSOCIATED WITH TYPE 2 DIABETES MELLITUS (MULTI): ICD-10-CM

## 2023-10-23 DIAGNOSIS — I50.32 CHRONIC DIASTOLIC CONGESTIVE HEART FAILURE (MULTI): Primary | ICD-10-CM

## 2023-10-23 DIAGNOSIS — I48.11 LONGSTANDING PERSISTENT ATRIAL FIBRILLATION (MULTI): ICD-10-CM

## 2023-10-23 PROBLEM — I35.0 AORTIC VALVE STENOSIS, MILD: Status: ACTIVE | Noted: 2023-10-23

## 2023-10-23 PROBLEM — I10 HYPERTENSION: Status: RESOLVED | Noted: 2023-04-11 | Resolved: 2023-10-23

## 2023-10-23 PROBLEM — I48.91 ATRIAL FIBRILLATION (MULTI): Status: RESOLVED | Noted: 2023-04-11 | Resolved: 2023-10-23

## 2023-10-23 PROCEDURE — 3077F SYST BP >= 140 MM HG: CPT | Performed by: INTERNAL MEDICINE

## 2023-10-23 PROCEDURE — 3078F DIAST BP <80 MM HG: CPT | Performed by: INTERNAL MEDICINE

## 2023-10-23 PROCEDURE — 99214 OFFICE O/P EST MOD 30 MIN: CPT | Performed by: INTERNAL MEDICINE

## 2023-10-23 PROCEDURE — 1160F RVW MEDS BY RX/DR IN RCRD: CPT | Performed by: INTERNAL MEDICINE

## 2023-10-23 PROCEDURE — 1159F MED LIST DOCD IN RCRD: CPT | Performed by: INTERNAL MEDICINE

## 2023-10-23 PROCEDURE — 1125F AMNT PAIN NOTED PAIN PRSNT: CPT | Performed by: INTERNAL MEDICINE

## 2023-10-23 PROCEDURE — 99204 OFFICE O/P NEW MOD 45 MIN: CPT | Performed by: INTERNAL MEDICINE

## 2023-10-23 PROCEDURE — 1036F TOBACCO NON-USER: CPT | Performed by: INTERNAL MEDICINE

## 2023-10-23 NOTE — PROGRESS NOTES
"Chief Complaint:   Atrial Fibrillation     History Of Present Illness:    Aicha Lord is a 89 y.o. female presenting with for routine follow-up of long-standing (>12 months) persistent atrial fibrillation.  The patient has been asymptomatic.  The patient has been rate-controlled in atrial fibrillation on medical treatment which they are tolerating well and are compliant.  The current treatment strategy is rate control.  The CHADS2-VASC2 score is  5 and the ACC/AHA guidelines recommend anticoagulation for stroke prophylaxis.  The patient is being treated with and has tolerated treatment with no bleeding and is compliant.      Review of Systems  All pertinent systems have been reviewed and are negative except for what is stated in the history of present illness.    All other systems have been reviewed and are negative and noncontributory to this patient's current ailments.  .     Last Recorded Vitals:  Visit Vitals  /67 (BP Location: Left arm)   Pulse 69   Temp 36.7 °C (98 °F)   Ht 1.499 m (4' 11\")   Wt 62 kg (136 lb 9.6 oz)   BMI 27.59 kg/m²   Smoking Status Never   BSA 1.61 m²        Past Medical History:  She has a past medical history of Abdominal pain of multiple sites (04/11/2023), Age-related nuclear cataract, left (04/11/2023), Age-related nuclear cataract, right (04/11/2023), Anemia (04/11/2023), Appetite loss (04/11/2023), Arrhythmia (04/11/2023), Change in bowel movement (04/11/2023), Cramp and spasm (10/05/2016), Dizziness and giddiness (02/25/2019), Edema (04/11/2023), Essential (primary) hypertension (12/08/2022), Hyperglycemia (04/11/2023), Hyperlipidemia, unspecified (12/08/2022), Hypermetropia, bilateral (01/24/2018), Iron deficiency anemia, unspecified (02/28/2019), Long term (current) use of antibiotics (05/06/2014), Longstanding persistent atrial fibrillation (CMS/HCC) (10/23/2023), 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), Pain in unspecified hand (08/07/2014), " Pain of left hand (04/11/2023), Palpitations (06/08/2023), Personal history of other diseases of the circulatory system, Personal history of other diseases of the digestive system (07/25/2016), Personal history of other diseases of the musculoskeletal system and connective tissue (07/01/2014), Personal history of other diseases of the respiratory system (04/01/2014), Personal history of other drug therapy (10/29/2018), Personal history of other endocrine, nutritional and metabolic disease, Personal history of other endocrine, nutritional and metabolic disease (10/02/2015), Personal history of other endocrine, nutritional and metabolic disease (01/31/2014), Personal history of other specified conditions (02/05/2019), Personal history of other specified conditions (02/28/2019), Personal history of pulmonary embolism (12/27/2019), Pneumonia, unspecified organism (01/30/2014), Prediabetes (04/30/2018), Subluxation of left index finger (04/11/2023), Type 2 diabetes mellitus without complications (CMS/HCC) (02/25/2019), Unspecified atrial fibrillation (CMS/HCC) (12/08/2022), Unspecified cataract, and Unspecified epiphora, unspecified side (07/25/2018).    Past Surgical History:  She has a past surgical history that includes Other surgical history (10/29/2013); Cataract extraction (04/17/2018); Breast lumpectomy (06/13/2017); Foot surgery (01/30/2014); Total knee arthroplasty (01/30/2014); Mastectomy (01/30/2014); and US guided thyroid biopsy (8/21/2014).      Social History:  She reports that she has never smoked. She has never used smokeless tobacco. She reports that she does not currently use alcohol. She reports that she does not currently use drugs.    Family History:  Family History   Problem Relation Name Age of Onset    Other (cardiac disorder) Father      Hypertension Father      Hodgkin's lymphoma Brother      Hypertension Paternal Grandmother      Hypertension Paternal Grandfather      Hodgkin's lymphoma Other  family history         Allergies:  Celecoxib, Ciprofloxacin, Penicillins, Promethazine, and Tramadol    Outpatient Medications:  Current Outpatient Medications   Medication Instructions    acetaminophen (Tylenol) 500 mg tablet 2 tablets, oral, Daily PRN    amLODIPine (Norvasc) 5 mg tablet 1 tablet, oral, Daily    cholecalciferol (Vitamin D-3) 25 MCG (1000 UT) capsule oral, TAKE AS DIRECTED.    furosemide (Lasix) 40 mg tablet 1 tablet, oral, 2 times daily    ketotifen (Zaditor) 0.025 % (0.035 %) ophthalmic solution ophthalmic (eye), 1 drop in both eyes 2 times a day as needed for itching/allergies    L. acidophilus/Bifid. animalis 32 billion cell capsule oral    rosuvastatin (Crestor) 5 mg tablet 1 tablet, oral, Daily    spironolactone (Aldactone) 25 mg tablet 1 tablet, oral, Daily    vit A,C and E-lutein-minerals (Ocuvite with Lutein) 300 mcg-200 mg-27 mg-2 mg tablet 1 tablet, oral, Daily    Xarelto 15 mg, oral, Daily with evening meal       Physical Exam:       Last Labs:  CBC -  Lab Results   Component Value Date    WBC 4.8 06/19/2023    HGB 12.8 06/19/2023    HCT 40.8 06/19/2023    MCV 94 06/19/2023     06/19/2023       CMP -  Lab Results   Component Value Date    CALCIUM 10.6 10/11/2023    PHOS 3.0 09/05/2019    PROT 6.0 (L) 06/19/2023    ALBUMIN 3.8 06/19/2023    AST 20 06/19/2023    ALT 16 06/19/2023    ALKPHOS 57 06/19/2023    BILITOT 0.8 06/19/2023       LIPID PANEL -   Lab Results   Component Value Date    CHOL 174 06/19/2023    HDL 53.7 06/19/2023    CHHDL 3.2 06/19/2023    VLDL 24 06/19/2023    TRIG 119 06/19/2023       RENAL FUNCTION PANEL -   Lab Results   Component Value Date    K 4.6 10/11/2023    PHOS 3.0 09/05/2019       Lab Results   Component Value Date     (H) 02/17/2019    HGBA1C 6.8 (A) 06/19/2023       Last Cardiology Tests:  ECG:  No results found for this or any previous visit from the past 1095 days.    Echo:  No echocardiogram results found for the past 12 months            Assessment/Plan       1. Chronic diastolic congestive heart failure (CMS/HCC)  Stable, euvolemic, NYHA FC II with HFpEF on GDMT and compliant with meds and diet.  - Follow Up In Cardiology; Future    2. Hypertension associated with type 2 diabetes mellitus (CMS/HCC)  Hypertension: The patient's blood pressure has been well-controlled at today's appointment or by recent primary care provider's measurements/home measurements and meets their goal blood pressure per the ACC/AHA guidelines.  The patient has been compliant with their anti-hypertensive medications and is following a low sodium/DASH diet and performs regular exercise.  I advised continuation of their present medical treatment and lifestyle modification.      - Follow Up In Cardiology; Future    3. Mixed hyperlipidemia  Hyperlipidemia: The patient's lipids are well controlled on chronic statin therapy and they are meeting their goal LDL cholesterol per the ACC/AHA guidelines.  The patient is compliant and tolerating statin therapy and is following a heart health diet and performs regular exercise.  The benefits of lipid lowering therapy have been discussed with the patient/family/caregiver.     - Follow Up In Cardiology; Future    4. Longstanding persistent atrial fibrillation (CMS/HCC)  Persistent trial fibrillation: The patient has been clinically stable, asymptomatic with persistent, rate-controlled atrial fibrillation as detailed in the HPI.  They will continue treatment with rate-controlling medications and anticoagulation for stroke prophylaxis based upon their present [id number] score, the risks and benefits of which were discussed with the patient/family/caregiver.   - Follow Up In Cardiology; Future       Facundo King MD    Exclusive of any other services or procedures performed, I, Facundo King MD , spent 30 minutes in duration for this visit today.  This time consisted of chart review, obtaining history, and/or performing the exam as  documented above as well as documenting the clinical information for the encounter in the electronic record, discussing treatment options, plans, and/or goals with patient, family, and/or caregiver, refilling medications, updating the electronic record, ordering medicines, lab work, imaging, referrals, and/or procedures as documented above and communicating with other [place] professionals. I have discussed the results of laboratory, radiology, and cardiology studies with the patient and their family/caregiver.

## 2023-10-30 ENCOUNTER — TELEPHONE (OUTPATIENT)
Dept: PRIMARY CARE | Facility: CLINIC | Age: 88
End: 2023-10-30

## 2023-10-30 ENCOUNTER — OFFICE VISIT (OUTPATIENT)
Dept: PRIMARY CARE | Facility: CLINIC | Age: 88
End: 2023-10-30
Payer: COMMERCIAL

## 2023-10-30 VITALS
DIASTOLIC BLOOD PRESSURE: 70 MMHG | BODY MASS INDEX: 28.02 KG/M2 | RESPIRATION RATE: 12 BRPM | OXYGEN SATURATION: 96 % | SYSTOLIC BLOOD PRESSURE: 116 MMHG | HEIGHT: 59 IN | HEART RATE: 66 BPM | WEIGHT: 139 LBS

## 2023-10-30 DIAGNOSIS — R60.0 BILATERAL LOWER EXTREMITY EDEMA: Primary | ICD-10-CM

## 2023-10-30 DIAGNOSIS — I15.2 HYPERTENSION ASSOCIATED WITH TYPE 2 DIABETES MELLITUS (MULTI): ICD-10-CM

## 2023-10-30 DIAGNOSIS — E11.59 HYPERTENSION ASSOCIATED WITH TYPE 2 DIABETES MELLITUS (MULTI): ICD-10-CM

## 2023-10-30 DIAGNOSIS — I50.32 CHRONIC DIASTOLIC CONGESTIVE HEART FAILURE (MULTI): ICD-10-CM

## 2023-10-30 PROCEDURE — 1159F MED LIST DOCD IN RCRD: CPT | Performed by: FAMILY MEDICINE

## 2023-10-30 PROCEDURE — 3078F DIAST BP <80 MM HG: CPT | Performed by: FAMILY MEDICINE

## 2023-10-30 PROCEDURE — 1160F RVW MEDS BY RX/DR IN RCRD: CPT | Performed by: FAMILY MEDICINE

## 2023-10-30 PROCEDURE — 1036F TOBACCO NON-USER: CPT | Performed by: FAMILY MEDICINE

## 2023-10-30 PROCEDURE — 1125F AMNT PAIN NOTED PAIN PRSNT: CPT | Performed by: FAMILY MEDICINE

## 2023-10-30 PROCEDURE — 99213 OFFICE O/P EST LOW 20 MIN: CPT | Performed by: FAMILY MEDICINE

## 2023-10-30 PROCEDURE — 3074F SYST BP LT 130 MM HG: CPT | Performed by: FAMILY MEDICINE

## 2023-10-30 ASSESSMENT — ENCOUNTER SYMPTOMS
APPETITE CHANGE: 0
DIARRHEA: 0
ARTHRALGIAS: 0
ADENOPATHY: 0
FATIGUE: 0
BRUISES/BLEEDS EASILY: 0
SLEEP DISTURBANCE: 0
HEADACHES: 0
SINUS PRESSURE: 0
MYALGIAS: 0
NAUSEA: 0
DYSPHORIC MOOD: 0
NERVOUS/ANXIOUS: 0
FEVER: 0
LIGHT-HEADEDNESS: 0
CHILLS: 0
DYSURIA: 0
DIFFICULTY URINATING: 0
ABDOMINAL DISTENTION: 0
WHEEZING: 0
CHEST TIGHTNESS: 0
SHORTNESS OF BREATH: 0
ABDOMINAL PAIN: 0

## 2023-10-30 NOTE — PROGRESS NOTES
"Subjective   Patient ID: Aicha Lord is a 89 y.o. female who presents for Leg wound.  Pt has increased LE edema and now with clear seepage from R ankle for 2 days. The ankles are not tender, red or warm. It resolves over night. She is on Amlodipine for BP, and has hx of stable diastolic CMP. She has compression hose but they are too tight and uncomfortable to wear.  She does elevated legs at rest.         Review of Systems   Constitutional:  Negative for appetite change, chills, fatigue and fever.   HENT:  Negative for congestion, ear pain and sinus pressure.    Eyes:  Negative for visual disturbance.   Respiratory:  Negative for chest tightness, shortness of breath and wheezing.    Cardiovascular:  Positive for leg swelling. Negative for chest pain.   Gastrointestinal:  Negative for abdominal distention, abdominal pain, diarrhea and nausea.   Genitourinary:  Negative for difficulty urinating, dysuria and pelvic pain.   Musculoskeletal:  Negative for arthralgias and myalgias.   Skin:  Negative for rash.   Allergic/Immunologic: Negative for immunocompromised state.   Neurological:  Negative for light-headedness and headaches.   Hematological:  Negative for adenopathy. Does not bruise/bleed easily.   Psychiatric/Behavioral:  Negative for dysphoric mood and sleep disturbance. The patient is not nervous/anxious.        Objective   /70   Pulse 66   Resp 12   Ht 1.499 m (4' 11\")   Wt 63 kg (139 lb)   SpO2 96%   BMI 28.07 kg/m²    Physical Exam  Constitutional:       General: She is not in acute distress.     Appearance: Normal appearance.   Cardiovascular:      Rate and Rhythm: Normal rate. Rhythm irregular.      Heart sounds: Murmur (chronic) heard.   Pulmonary:      Effort: Pulmonary effort is normal.      Breath sounds: Normal breath sounds.   Abdominal:      Palpations: Abdomen is soft.      Tenderness: There is no abdominal tenderness.   Skin:     Comments: She has chronic stasis dermatitis with " discoloration. Small seepage from posterior R ankle. There is no erythema, warmth or tenderness.   Neurological:      Mental Status: She is alert.   Psychiatric:         Mood and Affect: Mood normal.         Judgment: Judgment normal.           Assessment/Plan   Diagnoses and all orders for this visit:  Bilateral lower extremity edema/ Chronic diastolic congestive heart failure /Hypertension associated with type 2 diabetes mellitus - discontinue Amlodipine. Monitor BP at home and call if readings frequently near or >140/90. Keep wound covered, may use Antibiotic ointment. Also try to get zip up compression hose to use in daytime. Continue with leg elevation.    Follow up as planned

## 2023-10-30 NOTE — PROGRESS NOTES
"Subjective   Patient ID: Aicha Lord is a 89 y.o. female who presents for Leg wound.    HPI     Review of Systems    Objective   /70   Pulse 66   Resp 12   Ht 1.499 m (4' 11\")   Wt 63 kg (139 lb)   SpO2 96%   BMI 28.07 kg/m²     Physical Exam    Assessment/Plan          "

## 2023-10-30 NOTE — TELEPHONE ENCOUNTER
Pt states she has weeping area on R leg (same leg as DVT). Pain and edema. Wasn't to come come in this afternoon after 2 if possible. Saw cardiologist a couple weeks ago and was told to reach out to PCP.

## 2023-11-29 DIAGNOSIS — I50.32 CHRONIC DIASTOLIC CONGESTIVE HEART FAILURE (MULTI): Primary | ICD-10-CM

## 2023-11-29 RX ORDER — FUROSEMIDE 40 MG/1
TABLET ORAL
Qty: 180 TABLET | Refills: 3 | Status: SHIPPED | OUTPATIENT
Start: 2023-11-29

## 2023-12-11 ENCOUNTER — OFFICE VISIT (OUTPATIENT)
Dept: OPHTHALMOLOGY | Facility: CLINIC | Age: 88
End: 2023-12-11
Payer: COMMERCIAL

## 2023-12-11 DIAGNOSIS — H35.3134 ADVANCED ATROPHIC NONEXUDATIVE AGE-RELATED MACULAR DEGENERATION OF BOTH EYES WITH SUBFOVEAL INVOLVEMENT: ICD-10-CM

## 2023-12-11 DIAGNOSIS — H35.30 AGE-RELATED MACULAR DEGENERATION: Primary | ICD-10-CM

## 2023-12-11 DIAGNOSIS — H35.3221 EXUDATIVE AGE-RELATED MACULAR DEGENERATION OF LEFT EYE WITH ACTIVE CHOROIDAL NEOVASCULARIZATION (MULTI): ICD-10-CM

## 2023-12-11 DIAGNOSIS — H35.3211 WET AGE-RELATED MACULAR DEGENERATION OF RIGHT EYE WITH ACTIVE CHOROIDAL NEOVASCULARIZATION (MULTI): ICD-10-CM

## 2023-12-11 DIAGNOSIS — I15.2 HYPERTENSION ASSOCIATED WITH TYPE 2 DIABETES MELLITUS (MULTI): ICD-10-CM

## 2023-12-11 DIAGNOSIS — E11.59 HYPERTENSION ASSOCIATED WITH TYPE 2 DIABETES MELLITUS (MULTI): ICD-10-CM

## 2023-12-11 PROCEDURE — 67028 INJECTION EYE DRUG: CPT | Mod: LEFT SIDE | Performed by: OPHTHALMOLOGY

## 2023-12-11 PROCEDURE — 1160F RVW MEDS BY RX/DR IN RCRD: CPT | Performed by: OPHTHALMOLOGY

## 2023-12-11 PROCEDURE — 1159F MED LIST DOCD IN RCRD: CPT | Performed by: OPHTHALMOLOGY

## 2023-12-11 PROCEDURE — 1125F AMNT PAIN NOTED PAIN PRSNT: CPT | Performed by: OPHTHALMOLOGY

## 2023-12-11 PROCEDURE — 1036F TOBACCO NON-USER: CPT | Performed by: OPHTHALMOLOGY

## 2023-12-11 PROCEDURE — 92134 CPTRZ OPH DX IMG PST SGM RTA: CPT | Mod: BILATERAL PROCEDURE | Performed by: OPHTHALMOLOGY

## 2023-12-11 RX ORDER — CHROMIUM PICOLINATE 200 MCG
1 TABLET ORAL DAILY
COMMUNITY
Start: 2008-03-06

## 2023-12-11 ASSESSMENT — VISUAL ACUITY
OD_CC: 20/50
METHOD: SNELLEN - LINEAR
OS_CC: 20/60

## 2023-12-11 ASSESSMENT — ENCOUNTER SYMPTOMS: EYES NEGATIVE: 1

## 2023-12-11 ASSESSMENT — TONOMETRY
OD_IOP_MMHG: 15
OS_IOP_MMHG: 15
IOP_METHOD: GOLDMANN APPLANATION

## 2023-12-11 ASSESSMENT — CONF VISUAL FIELD
OS_NORMAL: 1
OD_NORMAL: 1
OD_SUPERIOR_TEMPORAL_RESTRICTION: 0
OS_SUPERIOR_NASAL_RESTRICTION: 0
OD_INFERIOR_TEMPORAL_RESTRICTION: 0
OS_SUPERIOR_TEMPORAL_RESTRICTION: 0
OS_INFERIOR_NASAL_RESTRICTION: 0
OD_INFERIOR_NASAL_RESTRICTION: 0
OS_INFERIOR_TEMPORAL_RESTRICTION: 0
OD_SUPERIOR_NASAL_RESTRICTION: 0

## 2023-12-11 ASSESSMENT — EXTERNAL EXAM - LEFT EYE: OS_EXAM: NORMAL

## 2023-12-11 ASSESSMENT — SLIT LAMP EXAM - LIDS
COMMENTS: NORMAL
COMMENTS: NORMAL

## 2023-12-11 ASSESSMENT — EXTERNAL EXAM - RIGHT EYE: OD_EXAM: NORMAL

## 2023-12-11 NOTE — PROGRESS NOTES
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 Recommend anti-VEGF injections. Eylea done OS today in a sterile manner with Betadine 5% for antisepsis  1m

## 2023-12-12 ENCOUNTER — OFFICE VISIT (OUTPATIENT)
Dept: PRIMARY CARE | Facility: CLINIC | Age: 88
End: 2023-12-12
Payer: COMMERCIAL

## 2023-12-12 VITALS
HEART RATE: 72 BPM | WEIGHT: 136 LBS | SYSTOLIC BLOOD PRESSURE: 124 MMHG | DIASTOLIC BLOOD PRESSURE: 70 MMHG | HEIGHT: 59 IN | BODY MASS INDEX: 27.42 KG/M2 | RESPIRATION RATE: 12 BRPM

## 2023-12-12 DIAGNOSIS — N18.32 STAGE 3B CHRONIC KIDNEY DISEASE (MULTI): ICD-10-CM

## 2023-12-12 DIAGNOSIS — M81.0 OSTEOPOROSIS, UNSPECIFIED OSTEOPOROSIS TYPE, UNSPECIFIED PATHOLOGICAL FRACTURE PRESENCE: ICD-10-CM

## 2023-12-12 DIAGNOSIS — I87.2 VENOUS (PERIPHERAL) INSUFFICIENCY: ICD-10-CM

## 2023-12-12 DIAGNOSIS — I50.32 CHRONIC DIASTOLIC CONGESTIVE HEART FAILURE (MULTI): ICD-10-CM

## 2023-12-12 DIAGNOSIS — E78.2 MIXED HYPERLIPIDEMIA: ICD-10-CM

## 2023-12-12 DIAGNOSIS — E11.59 HYPERTENSION ASSOCIATED WITH TYPE 2 DIABETES MELLITUS (MULTI): Primary | ICD-10-CM

## 2023-12-12 DIAGNOSIS — G89.4 CHRONIC PAIN SYNDROME: ICD-10-CM

## 2023-12-12 DIAGNOSIS — I15.2 HYPERTENSION ASSOCIATED WITH TYPE 2 DIABETES MELLITUS (MULTI): Primary | ICD-10-CM

## 2023-12-12 DIAGNOSIS — E21.0 PRIMARY HYPERPARATHYROIDISM (MULTI): ICD-10-CM

## 2023-12-12 PROCEDURE — 3074F SYST BP LT 130 MM HG: CPT | Performed by: FAMILY MEDICINE

## 2023-12-12 PROCEDURE — 1159F MED LIST DOCD IN RCRD: CPT | Performed by: FAMILY MEDICINE

## 2023-12-12 PROCEDURE — 3078F DIAST BP <80 MM HG: CPT | Performed by: FAMILY MEDICINE

## 2023-12-12 PROCEDURE — 1125F AMNT PAIN NOTED PAIN PRSNT: CPT | Performed by: FAMILY MEDICINE

## 2023-12-12 PROCEDURE — 1036F TOBACCO NON-USER: CPT | Performed by: FAMILY MEDICINE

## 2023-12-12 PROCEDURE — G0008 ADMIN INFLUENZA VIRUS VAC: HCPCS | Performed by: FAMILY MEDICINE

## 2023-12-12 PROCEDURE — 1160F RVW MEDS BY RX/DR IN RCRD: CPT | Performed by: FAMILY MEDICINE

## 2023-12-12 PROCEDURE — 90662 IIV NO PRSV INCREASED AG IM: CPT | Performed by: FAMILY MEDICINE

## 2023-12-12 PROCEDURE — 99214 OFFICE O/P EST MOD 30 MIN: CPT | Performed by: FAMILY MEDICINE

## 2023-12-12 RX ORDER — ROSUVASTATIN CALCIUM 5 MG/1
5 TABLET, COATED ORAL DAILY
Qty: 90 TABLET | Refills: 1 | Status: SHIPPED | OUTPATIENT
Start: 2023-12-12 | End: 2023-12-15 | Stop reason: SDUPTHER

## 2023-12-12 RX ORDER — SPIRONOLACTONE 25 MG/1
25 TABLET ORAL DAILY
Qty: 90 TABLET | Refills: 1 | Status: SHIPPED | OUTPATIENT
Start: 2023-12-12 | End: 2023-12-15 | Stop reason: SDUPTHER

## 2023-12-12 NOTE — PROGRESS NOTES
"Subjective   Patient ID: Aicha Lord is a 89 y.o. female who presents for Follow-up.  Pt is here for f/u Type 2 diabetes.   Pt is following low carb diet, and is exercising 7 days per week.  Taking no medications.   0 x daily accucheks . A1c 6.8.  Eye exam is current, follows closely with ophthalmology for wet macular degenerative  Pt does have foot pain, no paresthesias, or numbness in feet. Foot checks daily - yes .  Following with podiatry -  no   Denies vision changes, abdominal pain, excessive thirst, frequent urination.     Pt has chronic HTN, diastolic dysfunction. Sees Dr King.  Pt is taking Amlodipine, Aldactone. Tolerating well.  Exercising 7 days per week, walking   Low sodium diet is being followed.   Is not monitoring home blood pressures.   Denies HA, vision changes or CP.     Pt has Dyslipidemia.   Lipid panel showed LDL 90s.  Currently taking Crestor and is tolerating well without muscle pains or weakness.     For arthritic pain taking Tylenol as needed. PMR has been stable, denies synovitis.    For OP taking calcium, Vit D.             Review of Systems    Objective   /70   Pulse 72   Resp 12   Ht 1.499 m (4' 11\")   Wt 61.7 kg (136 lb)   BMI 27.47 kg/m²    Physical Exam      Assessment/Plan   Diagnoses and all orders for this visit:  Hypertension associated with type 2 diabetes mellitus - stop taking Amlodipine. A1c last check in good range, recheck fasting labs to monitor.   Chronic diastolic congestive heart failure - stable, continue to monitor with cardiology  Mixed hyperlipidemia - doing well on statin, monitor with labs  Venous (peripheral) insufficiency - discussed trying ACE wraps and leg elevation   Stage 3b chronic kidney disease /Primary hyperparathyroidism /CRI - stable, continue to monitor with labs  Osteoporosis, due for DEXA, ordered  Chronic pain syndrome - discussed taking Tylenol more regularly.    Follow up in 6month, 30min. Preventative in 2-3 months         "

## 2023-12-12 NOTE — PROGRESS NOTES
"Subjective   Patient ID: Aicha Lord is a 89 y.o. female who presents for Follow-up.    HPI     Review of Systems    Objective   /70   Pulse 72   Resp 12   Ht 1.499 m (4' 11\")   Wt 61.7 kg (136 lb)   BMI 27.47 kg/m²     Physical Exam    Assessment/Plan          "

## 2023-12-15 DIAGNOSIS — I50.32 CHRONIC DIASTOLIC CONGESTIVE HEART FAILURE (MULTI): ICD-10-CM

## 2023-12-15 DIAGNOSIS — E78.2 MIXED HYPERLIPIDEMIA: ICD-10-CM

## 2023-12-15 RX ORDER — SPIRONOLACTONE 25 MG/1
25 TABLET ORAL DAILY
Qty: 90 TABLET | Refills: 1 | Status: SHIPPED | OUTPATIENT
Start: 2023-12-15 | End: 2024-02-06 | Stop reason: WASHOUT

## 2023-12-15 RX ORDER — ROSUVASTATIN CALCIUM 5 MG/1
5 TABLET, COATED ORAL DAILY
Qty: 90 TABLET | Refills: 1 | Status: SHIPPED | OUTPATIENT
Start: 2023-12-15 | End: 2024-04-23 | Stop reason: SDUPTHER

## 2023-12-18 ENCOUNTER — ANCILLARY PROCEDURE (OUTPATIENT)
Dept: RADIOLOGY | Facility: CLINIC | Age: 88
End: 2023-12-18
Payer: COMMERCIAL

## 2023-12-18 DIAGNOSIS — M81.0 OSTEOPOROSIS, UNSPECIFIED OSTEOPOROSIS TYPE, UNSPECIFIED PATHOLOGICAL FRACTURE PRESENCE: ICD-10-CM

## 2023-12-18 PROCEDURE — 77080 DXA BONE DENSITY AXIAL: CPT | Performed by: RADIOLOGY

## 2023-12-18 PROCEDURE — 77080 DXA BONE DENSITY AXIAL: CPT

## 2023-12-20 ENCOUNTER — TELEPHONE (OUTPATIENT)
Dept: PRIMARY CARE | Facility: CLINIC | Age: 88
End: 2023-12-20

## 2023-12-20 ENCOUNTER — LAB (OUTPATIENT)
Dept: LAB | Facility: LAB | Age: 88
End: 2023-12-20
Payer: COMMERCIAL

## 2023-12-20 DIAGNOSIS — E11.59 HYPERTENSION ASSOCIATED WITH TYPE 2 DIABETES MELLITUS (MULTI): ICD-10-CM

## 2023-12-20 DIAGNOSIS — I15.2 HYPERTENSION ASSOCIATED WITH TYPE 2 DIABETES MELLITUS (MULTI): ICD-10-CM

## 2023-12-20 LAB
ALBUMIN SERPL BCP-MCNC: 4 G/DL (ref 3.4–5)
ALP SERPL-CCNC: 59 U/L (ref 33–136)
ALT SERPL W P-5'-P-CCNC: 15 U/L (ref 7–45)
ANION GAP SERPL CALC-SCNC: 12 MMOL/L (ref 10–20)
AST SERPL W P-5'-P-CCNC: 21 U/L (ref 9–39)
BILIRUB SERPL-MCNC: 0.5 MG/DL (ref 0–1.2)
BUN SERPL-MCNC: 30 MG/DL (ref 6–23)
CALCIUM SERPL-MCNC: 10.8 MG/DL (ref 8.6–10.6)
CHLORIDE SERPL-SCNC: 104 MMOL/L (ref 98–107)
CHOLEST SERPL-MCNC: 163 MG/DL (ref 0–199)
CHOLESTEROL/HDL RATIO: 2.7
CO2 SERPL-SCNC: 29 MMOL/L (ref 21–32)
CREAT SERPL-MCNC: 1.4 MG/DL (ref 0.5–1.05)
CREAT UR-MCNC: 92.5 MG/DL (ref 20–320)
EST. AVERAGE GLUCOSE BLD GHB EST-MCNC: 137 MG/DL
GFR SERPL CREATININE-BSD FRML MDRD: 35 ML/MIN/1.73M*2
GLUCOSE SERPL-MCNC: 113 MG/DL (ref 74–99)
HBA1C MFR BLD: 6.4 %
HDLC SERPL-MCNC: 60.4 MG/DL
LDLC SERPL CALC-MCNC: 89 MG/DL
MICROALBUMIN UR-MCNC: 11 MG/L
MICROALBUMIN/CREAT UR: 11.9 UG/MG CREAT
NON HDL CHOLESTEROL: 103 MG/DL (ref 0–149)
POTASSIUM SERPL-SCNC: 4.4 MMOL/L (ref 3.5–5.3)
PROT SERPL-MCNC: 6.4 G/DL (ref 6.4–8.2)
SODIUM SERPL-SCNC: 141 MMOL/L (ref 136–145)
TRIGL SERPL-MCNC: 69 MG/DL (ref 0–149)
VLDL: 14 MG/DL (ref 0–40)

## 2023-12-20 PROCEDURE — 82570 ASSAY OF URINE CREATININE: CPT

## 2023-12-20 PROCEDURE — 82043 UR ALBUMIN QUANTITATIVE: CPT

## 2023-12-20 PROCEDURE — 80053 COMPREHEN METABOLIC PANEL: CPT

## 2023-12-20 PROCEDURE — 36415 COLL VENOUS BLD VENIPUNCTURE: CPT

## 2023-12-20 PROCEDURE — 80061 LIPID PANEL: CPT

## 2023-12-20 PROCEDURE — 83036 HEMOGLOBIN GLYCOSYLATED A1C: CPT

## 2023-12-20 NOTE — TELEPHONE ENCOUNTER
----- Message from Reno Alfred MD sent at 12/18/2023  5:35 PM EST -----  Osteoporosis mildly progressed since 2021, she saw Dr Tyson for this is 2022 and declined tx. If wanting to tx recommend she schedules follow up appt with Dr Tyson  ----- Message -----  From: Interface, Radiology Results In  Sent: 12/18/2023   4:51 PM EST  To: Reno Alfred MD

## 2023-12-21 ENCOUNTER — TELEPHONE (OUTPATIENT)
Dept: PRIMARY CARE | Facility: CLINIC | Age: 88
End: 2023-12-21
Payer: COMMERCIAL

## 2023-12-21 NOTE — TELEPHONE ENCOUNTER
----- Message from Reno Alfred MD sent at 12/21/2023  7:08 AM EST -----  A1c in controlled range for diabetes.  LDL a little over goal, continue statin at current dose. Kidney function lower with elevated BUN, should increase water intake. Other labs look good.   ----- Message -----  From: Lab, Background User  Sent: 12/20/2023   5:43 PM EST  To: Reno Alfred MD

## 2023-12-22 ENCOUNTER — TELEPHONE (OUTPATIENT)
Dept: PRIMARY CARE | Facility: CLINIC | Age: 88
End: 2023-12-22
Payer: COMMERCIAL

## 2023-12-29 ENCOUNTER — OFFICE VISIT (OUTPATIENT)
Dept: ENDOCRINOLOGY | Facility: CLINIC | Age: 88
End: 2023-12-29
Payer: COMMERCIAL

## 2023-12-29 VITALS
SYSTOLIC BLOOD PRESSURE: 120 MMHG | WEIGHT: 139 LBS | HEART RATE: 76 BPM | DIASTOLIC BLOOD PRESSURE: 70 MMHG | HEIGHT: 59 IN | BODY MASS INDEX: 28.02 KG/M2 | RESPIRATION RATE: 16 BRPM

## 2023-12-29 DIAGNOSIS — E21.0 PRIMARY HYPERPARATHYROIDISM (MULTI): ICD-10-CM

## 2023-12-29 DIAGNOSIS — M81.0 OSTEOPOROSIS, UNSPECIFIED OSTEOPOROSIS TYPE, UNSPECIFIED PATHOLOGICAL FRACTURE PRESENCE: ICD-10-CM

## 2023-12-29 DIAGNOSIS — E04.2 NONTOXIC MULTINODULAR GOITER: Primary | ICD-10-CM

## 2023-12-29 PROCEDURE — 99214 OFFICE O/P EST MOD 30 MIN: CPT | Performed by: INTERNAL MEDICINE

## 2023-12-29 PROCEDURE — 1125F AMNT PAIN NOTED PAIN PRSNT: CPT | Performed by: INTERNAL MEDICINE

## 2023-12-29 PROCEDURE — 1036F TOBACCO NON-USER: CPT | Performed by: INTERNAL MEDICINE

## 2023-12-29 PROCEDURE — 3074F SYST BP LT 130 MM HG: CPT | Performed by: INTERNAL MEDICINE

## 2023-12-29 PROCEDURE — 3078F DIAST BP <80 MM HG: CPT | Performed by: INTERNAL MEDICINE

## 2023-12-29 PROCEDURE — 1159F MED LIST DOCD IN RCRD: CPT | Performed by: INTERNAL MEDICINE

## 2023-12-29 PROCEDURE — 1160F RVW MEDS BY RX/DR IN RCRD: CPT | Performed by: INTERNAL MEDICINE

## 2023-12-29 RX ORDER — KETOCONAZOLE 20 MG/G
CREAM TOPICAL
COMMUNITY
Start: 2019-10-15 | End: 2024-01-23 | Stop reason: WASHOUT

## 2023-12-29 ASSESSMENT — ENCOUNTER SYMPTOMS
DIARRHEA: 0
NAUSEA: 0
COUGH: 0
HEADACHES: 0
FEVER: 0
PALPITATIONS: 0
CHILLS: 0
SHORTNESS OF BREATH: 0
VOMITING: 0
FATIGUE: 0

## 2023-12-29 NOTE — PROGRESS NOTES
Endocrinology: Follow up visit  Subjective   Patient ID: Aicha Lord is a 89 y.o. female who presents for Hyperparathyroidism, Osteoporosis, and Goiter.    PCP: Reno Alfred MD    HPI  Last seen in April 2022.   History of mild primary hyperpara, osteoporosis and thyroid nodules.  She has been doing ok.  Only complaints are joint related and still having injections for MD.  Here to review labs per Dr GUERRIER.  Feels ok otherwise    Review of Systems   Constitutional:  Negative for chills, fatigue and fever.   Respiratory:  Negative for cough and shortness of breath.    Cardiovascular:  Negative for chest pain and palpitations.   Gastrointestinal:  Negative for diarrhea, nausea and vomiting.   Neurological:  Negative for headaches.       Patient Active Problem List   Diagnosis    Advanced atrophic nonexudative age-related macular degeneration of both eyes with subfoveal involvement    Age-related macular degeneration    Wet age-related macular degeneration of right eye with active choroidal neovascularization (CMS/HCC)    Anemia, iron deficiency    Ankle pain, left    Arthritis of finger    Asteroid hyalosis of left eye    Astigmatism of both eyes with presbyopia    Bilateral presbyopia    Cholelithiasis    Chronic diastolic congestive heart failure (CMS/HCC)    Dermatochalasis of both upper eyelids    Dry eyes, bilateral    Eye tearing    Hematuria    Hiatal hernia    Hypercalcemia    Hyperlipidemia    Hypertension associated with type 2 diabetes mellitus (CMS/HCC)    Impacted cerumen of both ears    Nontoxic multinodular goiter    Osteoporosis    Pancreatic cyst    PCO (posterior capsular opacification), left    PCO (posterior capsular opacification), right    Chronic pain syndrome    Fibromyalgia    Polymyalgia rheumatica (CMS/HCC)    Primary hyperparathyroidism (CMS/HCC)    Pseudophakia of both eyes    Vitamin D deficiency    Seasonal allergic rhinitis    Pruritic condition    Venous (peripheral)  insufficiency    Healthcare maintenance    Stage 3b chronic kidney disease (CMS/HCC)    Longstanding persistent atrial fibrillation (CMS/HCC)    Aortic valve stenosis, mild    Bilateral lower extremity edema        Home Meds:  Current Outpatient Medications   Medication Instructions    acetaminophen (Tylenol) 500 mg tablet 2 tablets, oral, Daily PRN    calcium carbonate-vitamin D3 600 mg-10 mcg (400 unit) capsule oral    cholecalciferol (Vitamin D-3) 25 MCG (1000 UT) capsule oral, TAKE AS DIRECTED.    furosemide (Lasix) 40 mg tablet TAKE 1 TABLET TWICE A DAY BY  MOUTH    ketoconazole (NIZOral) 2 % cream 1 Application    ketotifen (Zaditor) 0.025 % (0.035 %) ophthalmic solution ophthalmic (eye), 1 drop in both eyes 2 times a day as needed for itching/allergies    L. acidophilus/Bifid. animalis 32 billion cell capsule oral    rosuvastatin (CRESTOR) 5 mg, oral, Daily    spironolactone (ALDACTONE) 25 mg, oral, Daily    vit A,C and E-lutein-minerals (Ocuvite with Lutein) 300 mcg-200 mg-27 mg-2 mg tablet 1 tablet, oral, Daily    Xarelto 15 mg, oral, Daily with evening meal        Allergies   Allergen Reactions    Celecoxib Unknown    Ciprofloxacin Unknown    Penicillins Unknown    Promethazine Unknown    Tramadol Unknown        Objective   Vitals:    12/29/23 1144   BP: 120/70   Pulse: 76   Resp: 16      Vitals:    12/29/23 1144   Weight: 63 kg (139 lb)      Body mass index is 28.07 kg/m².   Physical Exam  Constitutional:       Appearance: Normal appearance. She is overweight.   HENT:      Head: Normocephalic and atraumatic.   Neck:      Thyroid: No thyroid mass, thyromegaly or thyroid tenderness.   Cardiovascular:      Rate and Rhythm: Normal rate and regular rhythm.      Heart sounds: No murmur heard.     No gallop.   Pulmonary:      Effort: Pulmonary effort is normal.      Breath sounds: Normal breath sounds.   Abdominal:      Palpations: Abdomen is soft.      Comments: benign   Neurological:      General: No focal  "deficit present.      Mental Status: She is alert and oriented to person, place, and time.      Deep Tendon Reflexes: Reflexes are normal and symmetric.   Psychiatric:         Behavior: Behavior is cooperative.         Labs:  Lab Results   Component Value Date    HGBA1C 6.4 (H) 12/20/2023    TSH 1.80 06/19/2023      No results found for: \"PR1\", \"THYROIDPAB\", \"TSI\"     Assessment/Plan   Problem List Items Addressed This Visit       Nontoxic multinodular goiter - Primary    Osteoporosis    Primary hyperparathyroidism (CMS/HCC)   Primary hyperpara:  Mild with stable calcium  No indication for surgery at this point especially considering her stability over time and age  Osteoporosis:  Recent dexa stable  She is reluctant to start meds which is think is very reasonable  Thyroid nodule:  Stable last ultrasound, no need to recheck  OK to follow up with me as needed    Electronically signed by:  Adelia Tyson MD 12/29/23 12:30 PM             "

## 2024-01-01 ENCOUNTER — APPOINTMENT (OUTPATIENT)
Dept: RADIOLOGY | Facility: HOSPITAL | Age: 89
DRG: 392 | End: 2024-01-01
Payer: MEDICARE

## 2024-01-01 ENCOUNTER — HOSPITAL ENCOUNTER (INPATIENT)
Facility: HOSPITAL | Age: 89
LOS: 9 days | Discharge: HOME HEALTH | DRG: 392 | End: 2024-01-11
Attending: EMERGENCY MEDICINE | Admitting: INTERNAL MEDICINE
Payer: MEDICARE

## 2024-01-01 DIAGNOSIS — R78.81 BACTEREMIA: ICD-10-CM

## 2024-01-01 DIAGNOSIS — I48.91 ATRIAL FIBRILLATION, UNSPECIFIED TYPE (MULTI): Primary | ICD-10-CM

## 2024-01-01 DIAGNOSIS — A41.9 SEPSIS, DUE TO UNSPECIFIED ORGANISM, UNSPECIFIED WHETHER ACUTE ORGAN DYSFUNCTION PRESENT (MULTI): ICD-10-CM

## 2024-01-01 DIAGNOSIS — I48.11 LONGSTANDING PERSISTENT ATRIAL FIBRILLATION (MULTI): ICD-10-CM

## 2024-01-01 DIAGNOSIS — K57.92 DIVERTICULITIS: ICD-10-CM

## 2024-01-01 LAB
ALBUMIN SERPL BCP-MCNC: 4.1 G/DL (ref 3.4–5)
ALP SERPL-CCNC: 70 U/L (ref 33–136)
ALT SERPL W P-5'-P-CCNC: 12 U/L (ref 7–45)
ANION GAP BLDV CALCULATED.4IONS-SCNC: 9 MMOL/L (ref 10–25)
ANION GAP SERPL CALC-SCNC: 16 MMOL/L (ref 10–20)
APPEARANCE UR: CLEAR
AST SERPL W P-5'-P-CCNC: 19 U/L (ref 9–39)
BACTERIA #/AREA URNS AUTO: ABNORMAL /HPF
BASE EXCESS BLDV CALC-SCNC: 3 MMOL/L (ref -2–3)
BASOPHILS # BLD AUTO: 0.02 X10*3/UL (ref 0–0.1)
BASOPHILS NFR BLD AUTO: 0.2 %
BILIRUB DIRECT SERPL-MCNC: 0.2 MG/DL (ref 0–0.3)
BILIRUB SERPL-MCNC: 1.2 MG/DL (ref 0–1.2)
BILIRUB UR STRIP.AUTO-MCNC: NEGATIVE MG/DL
BODY TEMPERATURE: 37 DEGREES CELSIUS
BUN SERPL-MCNC: 24 MG/DL (ref 6–23)
CA-I BLDV-SCNC: 1.37 MMOL/L (ref 1.1–1.33)
CALCIUM SERPL-MCNC: 10.9 MG/DL (ref 8.6–10.3)
CHLORIDE BLDV-SCNC: 97 MMOL/L (ref 98–107)
CHLORIDE SERPL-SCNC: 95 MMOL/L (ref 98–107)
CO2 SERPL-SCNC: 25 MMOL/L (ref 21–32)
COLOR UR: YELLOW
CREAT SERPL-MCNC: 1.24 MG/DL (ref 0.5–1.05)
EOSINOPHIL # BLD AUTO: 0.01 X10*3/UL (ref 0–0.4)
EOSINOPHIL NFR BLD AUTO: 0.1 %
ERYTHROCYTE [DISTWIDTH] IN BLOOD BY AUTOMATED COUNT: 15.9 % (ref 11.5–14.5)
GFR SERPL CREATININE-BSD FRML MDRD: 41 ML/MIN/1.73M*2
GLUCOSE BLDV-MCNC: 153 MG/DL (ref 74–99)
GLUCOSE SERPL-MCNC: 141 MG/DL (ref 74–99)
GLUCOSE UR STRIP.AUTO-MCNC: NEGATIVE MG/DL
HCO3 BLDV-SCNC: 28.5 MMOL/L (ref 22–26)
HCT VFR BLD AUTO: 40.5 % (ref 36–46)
HCT VFR BLD EST: 40 % (ref 36–46)
HGB BLD-MCNC: 13 G/DL (ref 12–16)
HGB BLDV-MCNC: 13.4 G/DL (ref 12–16)
HYALINE CASTS #/AREA URNS AUTO: ABNORMAL /LPF
IMM GRANULOCYTES # BLD AUTO: 0.01 X10*3/UL (ref 0–0.5)
IMM GRANULOCYTES NFR BLD AUTO: 0.1 % (ref 0–0.9)
INHALED O2 CONCENTRATION: 21 %
KETONES UR STRIP.AUTO-MCNC: NEGATIVE MG/DL
LACTATE BLDV-SCNC: 3.1 MMOL/L (ref 0.4–2)
LEUKOCYTE ESTERASE UR QL STRIP.AUTO: ABNORMAL
LIPASE SERPL-CCNC: 12 U/L (ref 9–82)
LYMPHOCYTES # BLD AUTO: 0.73 X10*3/UL (ref 0.8–3)
LYMPHOCYTES NFR BLD AUTO: 8.7 %
MCH RBC QN AUTO: 27.7 PG (ref 26–34)
MCHC RBC AUTO-ENTMCNC: 32.1 G/DL (ref 32–36)
MCV RBC AUTO: 86 FL (ref 80–100)
MONOCYTES # BLD AUTO: 0.11 X10*3/UL (ref 0.05–0.8)
MONOCYTES NFR BLD AUTO: 1.3 %
NEUTROPHILS # BLD AUTO: 7.51 X10*3/UL (ref 1.6–5.5)
NEUTROPHILS NFR BLD AUTO: 89.6 %
NITRITE UR QL STRIP.AUTO: NEGATIVE
NRBC BLD-RTO: 0 /100 WBCS (ref 0–0)
OXYHGB MFR BLDV: 31.3 % (ref 45–75)
PCO2 BLDV: 46 MM HG (ref 41–51)
PH BLDV: 7.4 PH (ref 7.33–7.43)
PH UR STRIP.AUTO: 5 [PH]
PLATELET # BLD AUTO: 178 X10*3/UL (ref 150–450)
PO2 BLDV: 28 MM HG (ref 35–45)
POTASSIUM BLDV-SCNC: 4 MMOL/L (ref 3.5–5.3)
POTASSIUM SERPL-SCNC: 3.9 MMOL/L (ref 3.5–5.3)
PROT SERPL-MCNC: 7 G/DL (ref 6.4–8.2)
PROT UR STRIP.AUTO-MCNC: NEGATIVE MG/DL
RBC # BLD AUTO: 4.69 X10*6/UL (ref 4–5.2)
RBC # UR STRIP.AUTO: NEGATIVE /UL
RBC #/AREA URNS AUTO: ABNORMAL /HPF
SAO2 % BLDV: 32 % (ref 45–75)
SODIUM BLDV-SCNC: 130 MMOL/L (ref 136–145)
SODIUM SERPL-SCNC: 132 MMOL/L (ref 136–145)
SP GR UR STRIP.AUTO: 1.01
SQUAMOUS #/AREA URNS AUTO: ABNORMAL /HPF
UROBILINOGEN UR STRIP.AUTO-MCNC: <2 MG/DL
WBC # BLD AUTO: 8.4 X10*3/UL (ref 4.4–11.3)
WBC #/AREA URNS AUTO: ABNORMAL /HPF

## 2024-01-01 PROCEDURE — 96361 HYDRATE IV INFUSION ADD-ON: CPT

## 2024-01-01 PROCEDURE — 87077 CULTURE AEROBIC IDENTIFY: CPT | Mod: AHULAB | Performed by: EMERGENCY MEDICINE

## 2024-01-01 PROCEDURE — 2500000004 HC RX 250 GENERAL PHARMACY W/ HCPCS (ALT 636 FOR OP/ED): Performed by: EMERGENCY MEDICINE

## 2024-01-01 PROCEDURE — 84132 ASSAY OF SERUM POTASSIUM: CPT | Performed by: EMERGENCY MEDICINE

## 2024-01-01 PROCEDURE — 82248 BILIRUBIN DIRECT: CPT | Performed by: EMERGENCY MEDICINE

## 2024-01-01 PROCEDURE — 85025 COMPLETE CBC W/AUTO DIFF WBC: CPT | Performed by: EMERGENCY MEDICINE

## 2024-01-01 PROCEDURE — 36415 COLL VENOUS BLD VENIPUNCTURE: CPT | Performed by: EMERGENCY MEDICINE

## 2024-01-01 PROCEDURE — 96375 TX/PRO/DX INJ NEW DRUG ADDON: CPT

## 2024-01-01 PROCEDURE — 74177 CT ABD & PELVIS W/CONTRAST: CPT | Performed by: STUDENT IN AN ORGANIZED HEALTH CARE EDUCATION/TRAINING PROGRAM

## 2024-01-01 PROCEDURE — 81003 URINALYSIS AUTO W/O SCOPE: CPT | Performed by: EMERGENCY MEDICINE

## 2024-01-01 PROCEDURE — 83690 ASSAY OF LIPASE: CPT | Performed by: EMERGENCY MEDICINE

## 2024-01-01 PROCEDURE — 74177 CT ABD & PELVIS W/CONTRAST: CPT

## 2024-01-01 PROCEDURE — 2550000001 HC RX 255 CONTRASTS: Performed by: EMERGENCY MEDICINE

## 2024-01-01 PROCEDURE — 99291 CRITICAL CARE FIRST HOUR: CPT | Performed by: EMERGENCY MEDICINE

## 2024-01-01 PROCEDURE — 84132 ASSAY OF SERUM POTASSIUM: CPT | Mod: 91 | Performed by: EMERGENCY MEDICINE

## 2024-01-01 RX ORDER — METRONIDAZOLE 500 MG/100ML
500 INJECTION, SOLUTION INTRAVENOUS ONCE
Status: COMPLETED | OUTPATIENT
Start: 2024-01-01 | End: 2024-01-02

## 2024-01-01 RX ORDER — ONDANSETRON HYDROCHLORIDE 2 MG/ML
4 INJECTION, SOLUTION INTRAVENOUS ONCE
Status: COMPLETED | OUTPATIENT
Start: 2024-01-01 | End: 2024-01-01

## 2024-01-01 RX ORDER — MORPHINE SULFATE 2 MG/ML
2 INJECTION, SOLUTION INTRAMUSCULAR; INTRAVENOUS ONCE
Status: COMPLETED | OUTPATIENT
Start: 2024-01-01 | End: 2024-01-01

## 2024-01-01 RX ORDER — ACETAMINOPHEN 325 MG/1
975 TABLET ORAL ONCE
Status: COMPLETED | OUTPATIENT
Start: 2024-01-01 | End: 2024-01-01

## 2024-01-01 RX ADMIN — IOHEXOL 75 ML: 350 INJECTION, SOLUTION INTRAVENOUS at 22:50

## 2024-01-01 RX ADMIN — SODIUM CHLORIDE, POTASSIUM CHLORIDE, SODIUM LACTATE AND CALCIUM CHLORIDE 500 ML: 600; 310; 30; 20 INJECTION, SOLUTION INTRAVENOUS at 21:24

## 2024-01-01 RX ADMIN — SODIUM CHLORIDE, SODIUM LACTATE, POTASSIUM CHLORIDE, AND CALCIUM CHLORIDE 500 ML: 600; 310; 30; 20 INJECTION, SOLUTION INTRAVENOUS at 22:50

## 2024-01-01 RX ADMIN — ACETAMINOPHEN 975 MG: 325 TABLET ORAL at 23:08

## 2024-01-01 RX ADMIN — ONDANSETRON 4 MG: 2 INJECTION INTRAMUSCULAR; INTRAVENOUS at 21:23

## 2024-01-01 RX ADMIN — MORPHINE SULFATE 2 MG: 2 INJECTION, SOLUTION INTRAMUSCULAR; INTRAVENOUS at 21:22

## 2024-01-01 ASSESSMENT — PAIN SCALES - GENERAL: PAINLEVEL_OUTOF10: 0 - NO PAIN

## 2024-01-01 ASSESSMENT — COLUMBIA-SUICIDE SEVERITY RATING SCALE - C-SSRS
1. IN THE PAST MONTH, HAVE YOU WISHED YOU WERE DEAD OR WISHED YOU COULD GO TO SLEEP AND NOT WAKE UP?: NO
2. HAVE YOU ACTUALLY HAD ANY THOUGHTS OF KILLING YOURSELF?: NO
6. HAVE YOU EVER DONE ANYTHING, STARTED TO DO ANYTHING, OR PREPARED TO DO ANYTHING TO END YOUR LIFE?: NO

## 2024-01-01 NOTE — Clinical Note
Patient Clipped and Prepped: right chest, right neck and right subclavian. Prepped with ChloraPrep, a minimum of 3 minute dry time, longer if needed, no pooling noted, patient draped in sterile fashion.

## 2024-01-02 ENCOUNTER — APPOINTMENT (OUTPATIENT)
Dept: CARDIOLOGY | Facility: HOSPITAL | Age: 89
DRG: 392 | End: 2024-01-02
Payer: MEDICARE

## 2024-01-02 PROBLEM — K57.92 DIVERTICULITIS: Status: ACTIVE | Noted: 2024-01-02

## 2024-01-02 LAB
ANION GAP SERPL CALC-SCNC: 11 MMOL/L (ref 10–20)
BUN SERPL-MCNC: 27 MG/DL (ref 6–23)
CALCIUM SERPL-MCNC: 9.4 MG/DL (ref 8.6–10.3)
CHLORIDE SERPL-SCNC: 98 MMOL/L (ref 98–107)
CO2 SERPL-SCNC: 25 MMOL/L (ref 21–32)
CREAT SERPL-MCNC: 1.32 MG/DL (ref 0.5–1.05)
GFR SERPL CREATININE-BSD FRML MDRD: 38 ML/MIN/1.73M*2
GLUCOSE BLD MANUAL STRIP-MCNC: 125 MG/DL (ref 74–99)
GLUCOSE SERPL-MCNC: 140 MG/DL (ref 74–99)
LACTATE BLDV-SCNC: 2 MMOL/L (ref 0.4–2)
POTASSIUM SERPL-SCNC: 4.2 MMOL/L (ref 3.5–5.3)
SODIUM SERPL-SCNC: 130 MMOL/L (ref 136–145)

## 2024-01-02 PROCEDURE — 2500000004 HC RX 250 GENERAL PHARMACY W/ HCPCS (ALT 636 FOR OP/ED): Performed by: NURSE PRACTITIONER

## 2024-01-02 PROCEDURE — 82947 ASSAY GLUCOSE BLOOD QUANT: CPT

## 2024-01-02 PROCEDURE — 87040 BLOOD CULTURE FOR BACTERIA: CPT | Mod: AHULAB | Performed by: EMERGENCY MEDICINE

## 2024-01-02 PROCEDURE — 96375 TX/PRO/DX INJ NEW DRUG ADDON: CPT

## 2024-01-02 PROCEDURE — 36415 COLL VENOUS BLD VENIPUNCTURE: CPT | Performed by: EMERGENCY MEDICINE

## 2024-01-02 PROCEDURE — 99222 1ST HOSP IP/OBS MODERATE 55: CPT | Performed by: INTERNAL MEDICINE

## 2024-01-02 PROCEDURE — 96365 THER/PROPH/DIAG IV INF INIT: CPT

## 2024-01-02 PROCEDURE — 2500000004 HC RX 250 GENERAL PHARMACY W/ HCPCS (ALT 636 FOR OP/ED): Performed by: EMERGENCY MEDICINE

## 2024-01-02 PROCEDURE — 2500000004 HC RX 250 GENERAL PHARMACY W/ HCPCS (ALT 636 FOR OP/ED): Performed by: INTERNAL MEDICINE

## 2024-01-02 PROCEDURE — 93005 ELECTROCARDIOGRAM TRACING: CPT

## 2024-01-02 PROCEDURE — 83605 ASSAY OF LACTIC ACID: CPT | Performed by: EMERGENCY MEDICINE

## 2024-01-02 PROCEDURE — 2500000001 HC RX 250 WO HCPCS SELF ADMINISTERED DRUGS (ALT 637 FOR MEDICARE OP): Performed by: INTERNAL MEDICINE

## 2024-01-02 PROCEDURE — 2500000001 HC RX 250 WO HCPCS SELF ADMINISTERED DRUGS (ALT 637 FOR MEDICARE OP): Performed by: NURSE PRACTITIONER

## 2024-01-02 PROCEDURE — 1100000001 HC PRIVATE ROOM DAILY

## 2024-01-02 PROCEDURE — 80048 BASIC METABOLIC PNL TOTAL CA: CPT | Performed by: NURSE PRACTITIONER

## 2024-01-02 RX ORDER — ACETAMINOPHEN 650 MG/1
650 SUPPOSITORY RECTAL EVERY 4 HOURS PRN
Status: DISCONTINUED | OUTPATIENT
Start: 2024-01-02 | End: 2024-01-12 | Stop reason: HOSPADM

## 2024-01-02 RX ORDER — ONDANSETRON HYDROCHLORIDE 2 MG/ML
4 INJECTION, SOLUTION INTRAVENOUS EVERY 8 HOURS PRN
Status: DISCONTINUED | OUTPATIENT
Start: 2024-01-02 | End: 2024-01-02

## 2024-01-02 RX ORDER — FUROSEMIDE 40 MG/1
40 TABLET ORAL DAILY
Status: DISCONTINUED | OUTPATIENT
Start: 2024-01-02 | End: 2024-01-09

## 2024-01-02 RX ORDER — SODIUM CHLORIDE 9 MG/ML
100 INJECTION, SOLUTION INTRAVENOUS CONTINUOUS
Status: DISCONTINUED | OUTPATIENT
Start: 2024-01-02 | End: 2024-01-02

## 2024-01-02 RX ORDER — PANTOPRAZOLE SODIUM 40 MG/10ML
40 INJECTION, POWDER, LYOPHILIZED, FOR SOLUTION INTRAVENOUS
Status: DISCONTINUED | OUTPATIENT
Start: 2024-01-03 | End: 2024-01-12 | Stop reason: HOSPADM

## 2024-01-02 RX ORDER — ACETAMINOPHEN 160 MG/5ML
650 SOLUTION ORAL EVERY 4 HOURS PRN
Status: DISCONTINUED | OUTPATIENT
Start: 2024-01-02 | End: 2024-01-02

## 2024-01-02 RX ORDER — POLYETHYLENE GLYCOL 3350 17 G/17G
17 POWDER, FOR SOLUTION ORAL DAILY
Status: DISCONTINUED | OUTPATIENT
Start: 2024-01-02 | End: 2024-01-12 | Stop reason: HOSPADM

## 2024-01-02 RX ORDER — ACETAMINOPHEN 325 MG/1
650 TABLET ORAL EVERY 4 HOURS PRN
Status: DISCONTINUED | OUTPATIENT
Start: 2024-01-02 | End: 2024-01-02

## 2024-01-02 RX ORDER — METOPROLOL SUCCINATE 50 MG/1
50 TABLET, EXTENDED RELEASE ORAL DAILY
COMMUNITY
Start: 2014-10-29 | End: 2024-01-11 | Stop reason: HOSPADM

## 2024-01-02 RX ORDER — TALC
3 POWDER (GRAM) TOPICAL DAILY
Status: DISCONTINUED | OUTPATIENT
Start: 2024-01-02 | End: 2024-01-12 | Stop reason: HOSPADM

## 2024-01-02 RX ORDER — AMLODIPINE BESYLATE 5 MG/1
5 TABLET ORAL
COMMUNITY
Start: 2015-09-18 | End: 2024-01-23 | Stop reason: WASHOUT

## 2024-01-02 RX ORDER — ACETAMINOPHEN 160 MG/5ML
650 SOLUTION ORAL EVERY 4 HOURS PRN
Status: DISCONTINUED | OUTPATIENT
Start: 2024-01-02 | End: 2024-01-12 | Stop reason: HOSPADM

## 2024-01-02 RX ORDER — DEXTROSE MONOHYDRATE 100 MG/ML
0.3 INJECTION, SOLUTION INTRAVENOUS ONCE AS NEEDED
Status: DISCONTINUED | OUTPATIENT
Start: 2024-01-02 | End: 2024-01-12 | Stop reason: HOSPADM

## 2024-01-02 RX ORDER — METRONIDAZOLE 500 MG/100ML
500 INJECTION, SOLUTION INTRAVENOUS EVERY 8 HOURS
Status: DISCONTINUED | OUTPATIENT
Start: 2024-01-02 | End: 2024-01-02

## 2024-01-02 RX ORDER — SODIUM CHLORIDE 9 MG/ML
50 INJECTION, SOLUTION INTRAVENOUS CONTINUOUS
Status: DISCONTINUED | OUTPATIENT
Start: 2024-01-02 | End: 2024-01-07

## 2024-01-02 RX ORDER — DEXTROSE 50 % IN WATER (D50W) INTRAVENOUS SYRINGE
25
Status: DISCONTINUED | OUTPATIENT
Start: 2024-01-02 | End: 2024-01-12 | Stop reason: HOSPADM

## 2024-01-02 RX ORDER — ALBUTEROL SULFATE 90 UG/1
2 AEROSOL, METERED RESPIRATORY (INHALATION) EVERY 4 HOURS PRN
COMMUNITY
Start: 2020-03-02 | End: 2024-01-23 | Stop reason: WASHOUT

## 2024-01-02 RX ORDER — ONDANSETRON HYDROCHLORIDE 2 MG/ML
4 INJECTION, SOLUTION INTRAVENOUS EVERY 8 HOURS PRN
Status: DISCONTINUED | OUTPATIENT
Start: 2024-01-02 | End: 2024-01-12 | Stop reason: HOSPADM

## 2024-01-02 RX ORDER — METRONIDAZOLE 500 MG/100ML
500 INJECTION, SOLUTION INTRAVENOUS EVERY 8 HOURS
Status: DISCONTINUED | OUTPATIENT
Start: 2024-01-02 | End: 2024-01-05

## 2024-01-02 RX ORDER — ONDANSETRON 4 MG/1
4 TABLET, FILM COATED ORAL EVERY 8 HOURS PRN
Status: DISCONTINUED | OUTPATIENT
Start: 2024-01-02 | End: 2024-01-12 | Stop reason: HOSPADM

## 2024-01-02 RX ORDER — ACETAMINOPHEN 325 MG/1
650 TABLET ORAL EVERY 4 HOURS PRN
Status: DISCONTINUED | OUTPATIENT
Start: 2024-01-02 | End: 2024-01-12 | Stop reason: HOSPADM

## 2024-01-02 RX ORDER — CLOBETASOL PROPIONATE 0.5 MG/G
1 OINTMENT TOPICAL 2 TIMES DAILY
COMMUNITY
Start: 2018-10-29 | End: 2024-01-23 | Stop reason: WASHOUT

## 2024-01-02 RX ORDER — POLYETHYLENE GLYCOL 3350 17 G/17G
17 POWDER, FOR SOLUTION ORAL DAILY
Status: DISCONTINUED | OUTPATIENT
Start: 2024-01-02 | End: 2024-01-02

## 2024-01-02 RX ORDER — GUAIFENESIN 600 MG/1
600 TABLET, EXTENDED RELEASE ORAL EVERY 12 HOURS PRN
Status: DISCONTINUED | OUTPATIENT
Start: 2024-01-02 | End: 2024-01-12 | Stop reason: HOSPADM

## 2024-01-02 RX ORDER — ACETAMINOPHEN 650 MG/1
650 SUPPOSITORY RECTAL EVERY 4 HOURS PRN
Status: DISCONTINUED | OUTPATIENT
Start: 2024-01-02 | End: 2024-01-02

## 2024-01-02 RX ORDER — TALC
3 POWDER (GRAM) TOPICAL DAILY
Status: DISCONTINUED | OUTPATIENT
Start: 2024-01-02 | End: 2024-01-02

## 2024-01-02 RX ORDER — ONDANSETRON 4 MG/1
4 TABLET, FILM COATED ORAL EVERY 8 HOURS PRN
Status: DISCONTINUED | OUTPATIENT
Start: 2024-01-02 | End: 2024-01-02

## 2024-01-02 RX ORDER — PANTOPRAZOLE SODIUM 40 MG/1
40 TABLET, DELAYED RELEASE ORAL
Status: DISCONTINUED | OUTPATIENT
Start: 2024-01-03 | End: 2024-01-12 | Stop reason: HOSPADM

## 2024-01-02 RX ORDER — ROSUVASTATIN CALCIUM 10 MG/1
5 TABLET, COATED ORAL NIGHTLY
Status: DISCONTINUED | OUTPATIENT
Start: 2024-01-02 | End: 2024-01-12 | Stop reason: HOSPADM

## 2024-01-02 RX ORDER — SPIRONOLACTONE 25 MG/1
25 TABLET ORAL DAILY
Status: DISCONTINUED | OUTPATIENT
Start: 2024-01-02 | End: 2024-01-12 | Stop reason: HOSPADM

## 2024-01-02 RX ORDER — PREGABALIN 25 MG/1
CAPSULE ORAL
COMMUNITY
Start: 2014-10-29 | End: 2024-01-23 | Stop reason: WASHOUT

## 2024-01-02 RX ORDER — BENZONATATE 200 MG/1
200 CAPSULE ORAL 3 TIMES DAILY PRN
COMMUNITY
Start: 2020-03-02 | End: 2024-01-23 | Stop reason: WASHOUT

## 2024-01-02 RX ADMIN — RIVAROXABAN 15 MG: 15 TABLET, FILM COATED ORAL at 18:46

## 2024-01-02 RX ADMIN — METRONIDAZOLE 500 MG: 500 INJECTION, SOLUTION INTRAVENOUS at 09:35

## 2024-01-02 RX ADMIN — METRONIDAZOLE 500 MG: 500 INJECTION, SOLUTION INTRAVENOUS at 00:58

## 2024-01-02 RX ADMIN — CEFEPIME 2 G: 2 INJECTION, POWDER, FOR SOLUTION INTRAVENOUS at 00:08

## 2024-01-02 RX ADMIN — ACETAMINOPHEN 650 MG: 325 TABLET ORAL at 12:05

## 2024-01-02 RX ADMIN — SODIUM CHLORIDE 100 ML/HR: 9 INJECTION, SOLUTION INTRAVENOUS at 18:46

## 2024-01-02 RX ADMIN — SODIUM CHLORIDE, POTASSIUM CHLORIDE, SODIUM LACTATE AND CALCIUM CHLORIDE 500 ML: 600; 310; 30; 20 INJECTION, SOLUTION INTRAVENOUS at 01:40

## 2024-01-02 RX ADMIN — CEFEPIME 1 G: 1 INJECTION, POWDER, FOR SOLUTION INTRAMUSCULAR; INTRAVENOUS at 21:42

## 2024-01-02 RX ADMIN — ROSUVASTATIN 5 MG: 10 TABLET, FILM COATED ORAL at 21:42

## 2024-01-02 RX ADMIN — CEFEPIME 1 G: 1 INJECTION, POWDER, FOR SOLUTION INTRAMUSCULAR; INTRAVENOUS at 09:00

## 2024-01-02 RX ADMIN — Medication 3 MG: at 21:42

## 2024-01-02 RX ADMIN — METRONIDAZOLE 500 MG: 500 INJECTION, SOLUTION INTRAVENOUS at 23:47

## 2024-01-02 RX ADMIN — PSYLLIUM HUSK 1 PACKET: 3.4 POWDER ORAL at 18:46

## 2024-01-02 SDOH — SOCIAL STABILITY: SOCIAL INSECURITY: DOES ANYONE TRY TO KEEP YOU FROM HAVING/CONTACTING OTHER FRIENDS OR DOING THINGS OUTSIDE YOUR HOME?: NO

## 2024-01-02 SDOH — SOCIAL STABILITY: SOCIAL INSECURITY: ARE YOU OR HAVE YOU BEEN THREATENED OR ABUSED PHYSICALLY, EMOTIONALLY, OR SEXUALLY BY ANYONE?: NO

## 2024-01-02 SDOH — SOCIAL STABILITY: SOCIAL INSECURITY: ARE THERE ANY APPARENT SIGNS OF INJURIES/BEHAVIORS THAT COULD BE RELATED TO ABUSE/NEGLECT?: NO

## 2024-01-02 SDOH — SOCIAL STABILITY: SOCIAL INSECURITY: HAS ANYONE EVER THREATENED TO HURT YOUR FAMILY OR YOUR PETS?: NO

## 2024-01-02 SDOH — SOCIAL STABILITY: SOCIAL INSECURITY: WERE YOU ABLE TO COMPLETE ALL THE BEHAVIORAL HEALTH SCREENINGS?: YES

## 2024-01-02 SDOH — SOCIAL STABILITY: SOCIAL INSECURITY: DO YOU FEEL ANYONE HAS EXPLOITED OR TAKEN ADVANTAGE OF YOU FINANCIALLY OR OF YOUR PERSONAL PROPERTY?: NO

## 2024-01-02 SDOH — SOCIAL STABILITY: SOCIAL INSECURITY: HAVE YOU HAD THOUGHTS OF HARMING ANYONE ELSE?: NO

## 2024-01-02 SDOH — SOCIAL STABILITY: SOCIAL INSECURITY: DO YOU FEEL UNSAFE GOING BACK TO THE PLACE WHERE YOU ARE LIVING?: NO

## 2024-01-02 SDOH — SOCIAL STABILITY: SOCIAL INSECURITY: ABUSE: ADULT

## 2024-01-02 ASSESSMENT — COGNITIVE AND FUNCTIONAL STATUS - GENERAL
DRESSING REGULAR LOWER BODY CLOTHING: A LITTLE
WALKING IN HOSPITAL ROOM: A LITTLE
TOILETING: A LITTLE
MOBILITY SCORE: 17
MOVING TO AND FROM BED TO CHAIR: A LITTLE
MOVING FROM LYING ON BACK TO SITTING ON SIDE OF FLAT BED WITH BEDRAILS: A LITTLE
STANDING UP FROM CHAIR USING ARMS: A LITTLE
HELP NEEDED FOR BATHING: A LITTLE
TURNING FROM BACK TO SIDE WHILE IN FLAT BAD: A LITTLE
DRESSING REGULAR UPPER BODY CLOTHING: A LITTLE
WALKING IN HOSPITAL ROOM: A LITTLE
PERSONAL GROOMING: A LITTLE
MOVING TO AND FROM BED TO CHAIR: A LITTLE
TOILETING: A LITTLE
EATING MEALS: A LITTLE
DRESSING REGULAR UPPER BODY CLOTHING: A LITTLE
STANDING UP FROM CHAIR USING ARMS: A LITTLE
DAILY ACTIVITIY SCORE: 19
DAILY ACTIVITIY SCORE: 18
CLIMB 3 TO 5 STEPS WITH RAILING: A LOT
TURNING FROM BACK TO SIDE WHILE IN FLAT BAD: A LITTLE
MOBILITY SCORE: 18
MOVING FROM LYING ON BACK TO SITTING ON SIDE OF FLAT BED WITH BEDRAILS: A LITTLE
PATIENT BASELINE BEDBOUND: NO
CLIMB 3 TO 5 STEPS WITH RAILING: A LITTLE
DRESSING REGULAR LOWER BODY CLOTHING: A LITTLE
PERSONAL GROOMING: A LITTLE
HELP NEEDED FOR BATHING: A LITTLE

## 2024-01-02 ASSESSMENT — ACTIVITIES OF DAILY LIVING (ADL)
HEARING - RIGHT EAR: DIFFICULTY WITH NOISE
LACK_OF_TRANSPORTATION: NO
JUDGMENT_ADEQUATE_SAFELY_COMPLETE_DAILY_ACTIVITIES: YES
HEARING - LEFT EAR: DIFFICULTY WITH NOISE
DRESSING YOURSELF: NEEDS ASSISTANCE
BATHING: NEEDS ASSISTANCE
FEEDING YOURSELF: NEEDS ASSISTANCE
ADEQUATE_TO_COMPLETE_ADL: YES
WALKS IN HOME: NEEDS ASSISTANCE
TOILETING: NEEDS ASSISTANCE
GROOMING: NEEDS ASSISTANCE
PATIENT'S MEMORY ADEQUATE TO SAFELY COMPLETE DAILY ACTIVITIES?: YES

## 2024-01-02 ASSESSMENT — PAIN SCALES - GENERAL
PAINLEVEL_OUTOF10: 0 - NO PAIN
PAINLEVEL_OUTOF10: 0 - NO PAIN
PAINLEVEL_OUTOF10: 3
PAINLEVEL_OUTOF10: 0 - NO PAIN
PAINLEVEL_OUTOF10: 4
PAINLEVEL_OUTOF10: 0 - NO PAIN

## 2024-01-02 ASSESSMENT — PATIENT HEALTH QUESTIONNAIRE - PHQ9
2. FEELING DOWN, DEPRESSED OR HOPELESS: NOT AT ALL
1. LITTLE INTEREST OR PLEASURE IN DOING THINGS: NOT AT ALL
SUM OF ALL RESPONSES TO PHQ9 QUESTIONS 1 & 2: 0

## 2024-01-02 ASSESSMENT — PAIN - FUNCTIONAL ASSESSMENT
PAIN_FUNCTIONAL_ASSESSMENT: 0-10

## 2024-01-02 ASSESSMENT — LIFESTYLE VARIABLES
SKIP TO QUESTIONS 9-10: 1
HOW OFTEN DO YOU HAVE 6 OR MORE DRINKS ON ONE OCCASION: NEVER
AUDIT-C TOTAL SCORE: 0
AUDIT-C TOTAL SCORE: 0
HOW MANY STANDARD DRINKS CONTAINING ALCOHOL DO YOU HAVE ON A TYPICAL DAY: PATIENT DOES NOT DRINK
HOW OFTEN DO YOU HAVE A DRINK CONTAINING ALCOHOL: NEVER

## 2024-01-02 ASSESSMENT — ENCOUNTER SYMPTOMS: ABDOMINAL PAIN: 1

## 2024-01-02 ASSESSMENT — PAIN DESCRIPTION - LOCATION: LOCATION: ABDOMEN

## 2024-01-02 NOTE — ED PROVIDER NOTES
Chief Complaint   Patient presents with    Abdominal Pain     History of Present Illness   Aicha Lord   MRN [id number]    89-year-old presents by EMS for evaluation of 24 hours of severe left lower quadrant pain associated with nausea and vomiting.  No associated chest pain, shortness of breath, fever or chills.  Patient reports normal urination.  She describes previous diagnosis of diverticulitis.  Takes Xarelto for atrial fibrillation but states that she did not take Xarelto today.    Physical Exam     ED Triage Vitals   Temp Pulse Resp BP   -- -- -- --      SpO2 Temp src Heart Rate Source Patient Position   -- -- -- --      BP Location FiO2 (%)     -- --         General: Appears uncomfortable but nontoxic.  Head: Atraumatic.  Eyes: No conjunctival injection.   Ears Nose Throat: Hearing grossly intact. No epistaxis. Oral mucosa moist.  Neck: Supple.  Cardiovascular: Extremities warm.  Irregularly irregular rhythm.  Pulmonary: No stridor. Normal respiratory effort.  Abdominal: Distended abdomen.  Soft.  Left lower quadrant tenderness with voluntary guarding.  Musculoskeletal: No deformity or joint swelling.  Skin: No rash.  Psychiatric: Does not appear to respond to internal stimuli.  Neurologic: Alert. Follows directions. Face symmetric. No aphasia or dysarthria. Symmetric movement of upper and lower extremities.    ED Course & Medical Decision Making   Patient was uncomfortable but nontoxic-appearing.  Exam notable for left lower quadrant tenderness with voluntary guarding.  Given advanced age and concerning acute onset severe abdominal pain, ordered CT of the abdomen pelvis with IV contrast and this demonstrated sigmoid diverticulitis without evidence of perforation or abscess.  Pain addressed with intravenous morphine.  Intravenous Zofran for nausea and vomiting.  Initial temperature reassuring however subsequently noted to be febrile.  No leukocytosis.  Given demonstrated diverticulitis and concern for  sepsis, blood and urine cultures obtained before administration of empiric broad-spectrum antibiotics cefepime and Flagyl given noted allergies to penicillin and fluoroquinolones.  Ordered IV fluid bolus and 500 cc increments given advanced age and existing cardiac disease including atrial fibrillation and diastolic dysfunction.  Lactate was initially 3 and improved to 2.0.  Discussed results with patient and failure members at bedside.  Discussed with hospitalist.  Admit for further management of sepsis due to acute diverticulitis.    ED Course as of 01/03/24 2025 Mon Jan 01, 2024   2114 ECG 12 lead  EKG interpreted by me.  1/1/2024 at 2105.  Atrial fibrillation 87 bpm.  QRS 66 QTc 409.  No ST elevation. [MC]      ED Course User Index  [MC] Jun Hicks MD         Diagnoses as of 01/03/24 2025   Diverticulitis   Sepsis, due to unspecified organism, unspecified whether acute organ dysfunction present (CMS/HCC)   Atrial fibrillation, unspecified type (CMS/HCC)            Jun Hicks MD  01/03/24 2021       Jun Hicks MD  01/03/24 2025

## 2024-01-02 NOTE — PROGRESS NOTES
Pharmacy Medication History Review    Aicha Lord is a 89 y.o. female admitted for Diverticulitis. Pharmacy reviewed the patient's [id number] medications and allergies for accuracy.    The list below reflectives the updated PTA list. Please review each medication in order reconciliation for additional clarification and justification.  Prior to Admission Medications   Prescriptions Last Dose Informant Patient Reported? Taking?             Xarelto 15 mg tablet 2023  Yes No   Sig: Take 1 tablet (15 mg) by mouth once daily in the evening. Take with meals.   acetaminophen (Tylenol) 500 mg tablet   Yes No   Sig: Take 2 tablets (1,000 mg) by mouth once daily as needed for mild pain (1 - 3).   calcium carbonate-vitamin D3 600 mg-10 mcg (400 unit) capsule 2024  Yes No   Sig: Take 1 tablet by mouth once daily.   cholecalciferol (Vitamin D-3) 25 MCG (1000 UT) capsule 2024  Yes No   Sig: Take 1 capsule (25 mcg) by mouth once daily. TAKE AS DIRECTED.   furosemide (Lasix) 40 mg tablet 2024  No No   Sig: TAKE 1 TABLET TWICE A DAY BY  MOUTH   ketoconazole (NIZOral) 2 % cream   Yes No   Si Application   ketotifen (Zaditor) 0.025 % (0.035 %) ophthalmic solution   Yes No   Sig: Administer into affected eye(s). 1 drop in both eyes 2 times a day as needed for itching/allergies   rosuvastatin (Crestor) 5 mg tablet 2024  No No   Sig: Take 1 tablet (5 mg) by mouth once daily.   spironolactone (Aldactone) 25 mg tablet 2024  No No   Sig: Take 1 tablet (25 mg) by mouth once daily.   vit A,C and E-lutein-minerals (Ocuvite with Lutein) 300 mcg-200 mg-27 mg-2 mg tablet 2024  Yes No   Sig: Take 1 tablet by mouth once daily.      Facility-Administered Medications: None           The list below reflectives the updated allergy list. Please review each documented allergy for additional clarification and justification.  Allergies  Reviewed by Jun Hicks MD on 2024        Severity Reactions  Comments    Celecoxib Not Specified Unknown     Ciprofloxacin Not Specified Unknown     Penicillins Not Specified Unknown     Promethazine Not Specified Unknown     Statins-hmg-coa Reductase Inhibitors Not Specified Unknown     Sulfamethoxazole-trimethoprim Not Specified Unknown     Tramadol Not Specified Unknown             Below are additional concerns with the patient's PTA list.  Patient verified all medications and doses.    Vanessa Hector CPhT

## 2024-01-02 NOTE — PROGRESS NOTES
Transitional Care Coordination Progress Note:  Plan per Medical/Surgical team: treatment of diverticulitis with IV ATB, IV flagyl, IV fluids, NPO  Status: inpatient  Payor source: [facility]  Discharge disposition: Home with son  Potential Barriers: , BP 97/45  ADOD: 1/4/2023  ZULAY Mendoza RN, BSN Transitional Care Coordinator ED# [phone]      01/02/24 0720   Discharge Planning   Living Arrangements Children   Support Systems Children   Assistance Needed pain management   Type of Residence Private residence   Home or Post Acute Services None   Patient expects to be discharged to: Home with son   Does the patient need discharge transport arranged? Yes   RoundTrip coordination needed? Yes   Has discharge transport been arranged? No   Financial Resource Strain   How hard is it for you to pay for the very basics like food, housing, medical care, and heating? Not hard   Housing Stability   In the last 12 months, was there a time when you were not able to pay the mortgage or rent on time? N   In the last 12 months, how many places have you lived? 1   In the last 12 months, was there a time when you did not have a steady place to sleep or slept in a shelter (including now)? N   Transportation Needs   In the past 12 months, has lack of transportation kept you from medical appointments or from getting medications? no   In the past 12 months, has lack of transportation kept you from meetings, work, or from getting things needed for daily living? No

## 2024-01-02 NOTE — PROGRESS NOTES
Home with son     01/02/24 0719   Current Planned Discharge Disposition   Current Planned Discharge Disposition Home

## 2024-01-02 NOTE — H&P (VIEW-ONLY)
Aicha Lord is a 89 y.o. female   Abdominal Pain       Patient with a past medical history of atrial fibrillation on Xarelto hypertension diabetes mellitus type 2 dyslipidemia stage III kidney disease history of primary hyperparathyroidism osteoporosis chronic pain syndrome comes in with sudden onset of left lower quadrant pain  Imaging shows acute diverticulitis  Is a third such episode  Denies any fevers chills nausea vomiting or diarrhea    Past Medical History  Past Medical History:   Diagnosis Date    Abdominal pain of multiple sites 04/11/2023    Age-related nuclear cataract, left 04/11/2023    Age-related nuclear cataract, right 04/11/2023    Anemia 04/11/2023    Appetite loss 04/11/2023    Arrhythmia 04/11/2023    Change in bowel movement 04/11/2023    Cramp and spasm 10/05/2016    Cramps of lower extremity    Dizziness and giddiness 02/25/2019    Postural dizziness with presyncope    Edema 04/11/2023    Essential (primary) hypertension 12/08/2022    Hypertension    Hyperglycemia 04/11/2023    Hyperlipidemia, unspecified 12/08/2022    Hyperlipidemia    Hypermetropia, bilateral 01/24/2018    Hyperopia of both eyes with astigmatism and presbyopia    Iron deficiency anemia, unspecified 02/28/2019    Microcytic anemia    Long term (current) use of antibiotics 05/06/2014    Prophylactic antibiotic    Longstanding persistent atrial fibrillation (CMS/HCC) 10/23/2023    Macular degeneration     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 03/28/2019    IPMN (intraductal papillary mucinous neoplasm)    Pain in unspecified hand 08/07/2014    Pain in hand    Pain of left hand 04/11/2023    Palpitations 06/08/2023    Personal history of other diseases of the circulatory system     History of congestive heart failure    Personal history of other diseases of the digestive system 07/25/2016    History of diverticulitis of colon    Personal history of other diseases of the musculoskeletal system and connective  tissue 07/01/2014    History of osteopenia    Personal history of other diseases of the respiratory system 04/01/2014    History of acute bronchitis    Personal history of other drug therapy 10/29/2018    History of pneumococcal vaccination    Personal history of other endocrine, nutritional and metabolic disease     History of type 2 diabetes mellitus    Personal history of other endocrine, nutritional and metabolic disease 10/02/2015    History of hyperglycemia    Personal history of other endocrine, nutritional and metabolic disease 01/31/2014    History of hypoparathyroidism    Personal history of other specified conditions 02/05/2019    History of shortness of breath    Personal history of other specified conditions 02/28/2019    History of fatigue    Personal history of pulmonary embolism 12/27/2019    History of pulmonary embolism    Pneumonia, unspecified organism 01/30/2014    Community acquired pneumonia    Prediabetes 04/30/2018    Prediabetes    Subluxation of left index finger 04/11/2023    Type 2 diabetes mellitus without complications (CMS/[facility]) 02/25/2019    Type 2 diabetes mellitus without complication    Unspecified atrial fibrillation (CMS/[facility]) 12/08/2022    Atrial fibrillation    Unspecified cataract     Cataracts, both eyes    Unspecified epiphora, unspecified side 07/25/2018    Eye tearing       Surgical History  Past Surgical History:   Procedure Laterality Date    BREAST LUMPECTOMY  06/13/2017    Breast Surgery Lumpectomy    CATARACT EXTRACTION  04/17/2018    Cataract Surgery    FOOT SURGERY  01/30/2014    Foot Surgery    MASTECTOMY  01/30/2014    Breast Surgery Mastectomy    OTHER SURGICAL HISTORY  10/29/2013    Wrist Surgery    TOTAL KNEE ARTHROPLASTY  01/30/2014    Knee Replacement    US GUIDED THYROID BIOPSY  8/21/2014    US GUIDED THYROID BIOPSY 8/21/2014 [facility] ANCILLARY LEGACY        Social History  She reports that she has never smoked. She has never used smokeless tobacco. She reports  that she does not currently use alcohol. She reports that she does not currently use drugs.    Family History  Family History   Problem Relation Name Age of Onset    Other (cardiac disorder) Father      Hypertension Father      Hodgkin's lymphoma Brother      Hypertension Paternal Grandmother      Hypertension Paternal Grandfather      Hodgkin's lymphoma Other family history         Allergies  Celecoxib, Ciprofloxacin, Penicillins, Promethazine, Statins-hmg-coa reductase inhibitors, Sulfamethoxazole-trimethoprim, and Tramadol    Review of Systems   Gastrointestinal:  Positive for abdominal pain.        Constitutional: not feeling poorly, no fever, no recent weight gain and no recent weight loss.   Eyes: no blurred vision and no diplopia.   ENT: no hearing loss, no tinnitus, no earache, no sore throat, no hoarseness and no swollen glands in the neck.   Cardiovascular: no chest pain, no tightness or heavy pressure, no shortness of breath, no palpitations and no lower extremity edema.   Respiratory: no cough, wheezing or shortness of breath at rest or exertion  Gastrointestinal: no change in bowel habits, no diarrhea, no constipation, no bloody stools,  no signs and symptoms of ulcer disease, no jah colored stools and no intolerance to fatty foods.   Genitourinary: no urinary frequency, no dysuria, no hematuria, no burning sensation during urination, urinary stream is not smaller and urinary stream does not start and stop.   Musculoskeletal: no arthralgias, no joint stiffness, no muscle weakness, no back pain and no difficulty walking.   Skin: no rashes, no change in skin color and pigmentation, no skin lesions and no skin lumps.   Neurological: no headaches, no dizziness, no seizures, no tingling, no numbness, no signs and symptoms of stroke and no limb weakness.   Psychiatric: no confusion, no memory lapses or loss, no depression and no sleep disturbances.   Endocrine: no excessive thirst, no dry skin, no cold  intolerance, no heat intolerance and no increased urinary frequency.   Hematologic/Lymphatic: is not slow to heal, does not bleed easily, does not bruise easily, no thrombophlebitis, no anemia and no history of blood transfusion.   All other systems have been reviewed and are negative for complaint.     Vitals:    01/02/24 1235   BP: 102/56   Pulse: (!) 49   Resp: 14   Temp:    SpO2: 97%        Scheduled medications  cefepime, 1 g, intravenous, q12h  melatonin, 3 mg, oral, Daily  metroNIDAZOLE, 500 mg, intravenous, q8h  polyethylene glycol, 17 g, oral, Daily      Continuous medications     PRN medications  PRN medications: acetaminophen **OR** acetaminophen **OR** acetaminophen, dextrose 10 % in water (D10W), dextrose, glucagon, ondansetron **OR** ondansetron    Results from last 7 days   Lab Units 01/01/24  2131   WBC AUTO x10*3/uL 8.4   HEMOGLOBIN g/dL 13.0   HEMATOCRIT % 40.5   PLATELETS AUTO x10*3/uL 178     Results from last 7 days   Lab Units 01/02/24  1234 01/01/24  2131   SODIUM mmol/L 130* 132*   POTASSIUM mmol/L 4.2 3.9   CHLORIDE mmol/L 98 95*   CO2 mmol/L 25 25   BUN mg/dL 27* 24*   CREATININE mg/dL 1.32* 1.24*   CALCIUM mg/dL 9.4 10.9*   PROTEIN TOTAL g/dL  --  7.0   BILIRUBIN TOTAL mg/dL  --  1.2   ALK PHOS U/L  --  70   ALT U/L  --  12   AST U/L  --  19   GLUCOSE mg/dL 140* 141*            CT abdomen pelvis w IV contrast   Final Result   1.  Diffuse diverticulosis with focal thickening of the inferior wall   of the mid sigmoid: And associated small amount of ascitic fluid in   the pelvis consistent with sigmoid diverticulitis. This is similar in   location compared to 2016.  no evidence of free air or fluid   collection.   2. Moderate-sized hiatal hernia filled with fluid suggestive of   reflux esophagitis.   3. Osteopenia with age indeterminate compression deformity of the   inferior endplate of L1 new from 2016. Correlation with point   tenderness is recommended.   4. Cholelithiasis without  cholecystitis. Right greater than left   renal atrophy similar to prior.             Signed by: Saleem Gill 1/1/2024 11:48 PM   Dictation workstation:   [id number]          Physical Exam     Constitutional   General appearance: Alert   Eyes   Inspection of eyes: Sclera and conjunctiva were normal.    Pupil exam: Pupils were equal in size. Extraocular movements were intact.   Pulmonary   Respiratory assessment: No respiratory distress, normal respiratory rhythm and effort.    Auscultation of Lungs: Clear bilateral breath sounds.   Cardiovascular   Auscultation of heart: Apical pulse normal, heart rate and rhythm normal, normal S1 and S2, no murmurs and no pericardial rub.    Exam for edema: No peripheral edema.   Abdomen   Abdominal Exam: Tender left lower quadrant  Liver and Spleen exam: No hepato-splenomegaly.   Musculoskeletal   Examination of gait: Normal.    Inspection of digits and nails: No clubbing or cyanosis of the fingernails.    Inspection/palpation of joints, bones and muscles: No joint swelling. Normal movement of all extremities.   Skin   Skin inspection: Normal skin color and pigmentation, normal skin turgor and no visible rash.   Neurologic   Cranial nerves: Nerves 2-12 were intact, no focal neuro defects.   Psychiatric   Orientation: Oriented to person, place, and time.    Mood and affect: Normal.       Assessment/Plan      #Acute recurrent diverticulitis  Started IV antibiotics    #Hypotension  Start IV fluids  Hold blood pressure medicines    #Atrial fibrillation  Continue Xarelto    #Dyslipidemia  #Diabetes mellitus  Sliding-scale insulin coverage    #Hyponatremia  #Chronic kidney disease stage IIIb  Monitor while on fluids

## 2024-01-02 NOTE — PROGRESS NOTES
Patient has been identified as having an emergent need for administration of iodinated contrast for CT scan prior to result of laboratory studies OR despite known elevated GFR due to possibility of life and/or limb threatening pathology.    I acknowledge the risks and benefits of emergently proceeding with contrast administration including that, at present, it is the position of the [employer] that contrast induced nephropathy (DOUG) is a rare but possible consequence. At this time the benefits of proceeding with contrast administration outweigh the risks.    Attempts will be made to mitigate possible DOUG risk with IV fluid hydration if able.    Jun Hicks MD

## 2024-01-02 NOTE — H&P
Aicha Lord is a 89 y.o. female   Abdominal Pain       Patient with a past medical history of atrial fibrillation on Xarelto hypertension diabetes mellitus type 2 dyslipidemia stage III kidney disease history of primary hyperparathyroidism osteoporosis chronic pain syndrome comes in with sudden onset of left lower quadrant pain  Imaging shows acute diverticulitis  Is a third such episode  Denies any fevers chills nausea vomiting or diarrhea    Past Medical History  Past Medical History:   Diagnosis Date    Abdominal pain of multiple sites 04/11/2023    Age-related nuclear cataract, left 04/11/2023    Age-related nuclear cataract, right 04/11/2023    Anemia 04/11/2023    Appetite loss 04/11/2023    Arrhythmia 04/11/2023    Change in bowel movement 04/11/2023    Cramp and spasm 10/05/2016    Cramps of lower extremity    Dizziness and giddiness 02/25/2019    Postural dizziness with presyncope    Edema 04/11/2023    Essential (primary) hypertension 12/08/2022    Hypertension    Hyperglycemia 04/11/2023    Hyperlipidemia, unspecified 12/08/2022    Hyperlipidemia    Hypermetropia, bilateral 01/24/2018    Hyperopia of both eyes with astigmatism and presbyopia    Iron deficiency anemia, unspecified 02/28/2019    Microcytic anemia    Long term (current) use of antibiotics 05/06/2014    Prophylactic antibiotic    Longstanding persistent atrial fibrillation (CMS/HCC) 10/23/2023    Macular degeneration     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 03/28/2019    IPMN (intraductal papillary mucinous neoplasm)    Pain in unspecified hand 08/07/2014    Pain in hand    Pain of left hand 04/11/2023    Palpitations 06/08/2023    Personal history of other diseases of the circulatory system     History of congestive heart failure    Personal history of other diseases of the digestive system 07/25/2016    History of diverticulitis of colon    Personal history of other diseases of the musculoskeletal system and connective  tissue 07/01/2014    History of osteopenia    Personal history of other diseases of the respiratory system 04/01/2014    History of acute bronchitis    Personal history of other drug therapy 10/29/2018    History of pneumococcal vaccination    Personal history of other endocrine, nutritional and metabolic disease     History of type 2 diabetes mellitus    Personal history of other endocrine, nutritional and metabolic disease 10/02/2015    History of hyperglycemia    Personal history of other endocrine, nutritional and metabolic disease 01/31/2014    History of hypoparathyroidism    Personal history of other specified conditions 02/05/2019    History of shortness of breath    Personal history of other specified conditions 02/28/2019    History of fatigue    Personal history of pulmonary embolism 12/27/2019    History of pulmonary embolism    Pneumonia, unspecified organism 01/30/2014    Community acquired pneumonia    Prediabetes 04/30/2018    Prediabetes    Subluxation of left index finger 04/11/2023    Type 2 diabetes mellitus without complications (CMS/[facility]) 02/25/2019    Type 2 diabetes mellitus without complication    Unspecified atrial fibrillation (CMS/[facility]) 12/08/2022    Atrial fibrillation    Unspecified cataract     Cataracts, both eyes    Unspecified epiphora, unspecified side 07/25/2018    Eye tearing       Surgical History  Past Surgical History:   Procedure Laterality Date    BREAST LUMPECTOMY  06/13/2017    Breast Surgery Lumpectomy    CATARACT EXTRACTION  04/17/2018    Cataract Surgery    FOOT SURGERY  01/30/2014    Foot Surgery    MASTECTOMY  01/30/2014    Breast Surgery Mastectomy    OTHER SURGICAL HISTORY  10/29/2013    Wrist Surgery    TOTAL KNEE ARTHROPLASTY  01/30/2014    Knee Replacement    US GUIDED THYROID BIOPSY  8/21/2014    US GUIDED THYROID BIOPSY 8/21/2014 [facility] ANCILLARY LEGACY        Social History  She reports that she has never smoked. She has never used smokeless tobacco. She reports  that she does not currently use alcohol. She reports that she does not currently use drugs.    Family History  Family History   Problem Relation Name Age of Onset    Other (cardiac disorder) Father      Hypertension Father      Hodgkin's lymphoma Brother      Hypertension Paternal Grandmother      Hypertension Paternal Grandfather      Hodgkin's lymphoma Other family history         Allergies  Celecoxib, Ciprofloxacin, Penicillins, Promethazine, Statins-hmg-coa reductase inhibitors, Sulfamethoxazole-trimethoprim, and Tramadol    Review of Systems   Gastrointestinal:  Positive for abdominal pain.        Constitutional: not feeling poorly, no fever, no recent weight gain and no recent weight loss.   Eyes: no blurred vision and no diplopia.   ENT: no hearing loss, no tinnitus, no earache, no sore throat, no hoarseness and no swollen glands in the neck.   Cardiovascular: no chest pain, no tightness or heavy pressure, no shortness of breath, no palpitations and no lower extremity edema.   Respiratory: no cough, wheezing or shortness of breath at rest or exertion  Gastrointestinal: no change in bowel habits, no diarrhea, no constipation, no bloody stools,  no signs and symptoms of ulcer disease, no jah colored stools and no intolerance to fatty foods.   Genitourinary: no urinary frequency, no dysuria, no hematuria, no burning sensation during urination, urinary stream is not smaller and urinary stream does not start and stop.   Musculoskeletal: no arthralgias, no joint stiffness, no muscle weakness, no back pain and no difficulty walking.   Skin: no rashes, no change in skin color and pigmentation, no skin lesions and no skin lumps.   Neurological: no headaches, no dizziness, no seizures, no tingling, no numbness, no signs and symptoms of stroke and no limb weakness.   Psychiatric: no confusion, no memory lapses or loss, no depression and no sleep disturbances.   Endocrine: no excessive thirst, no dry skin, no cold  intolerance, no heat intolerance and no increased urinary frequency.   Hematologic/Lymphatic: is not slow to heal, does not bleed easily, does not bruise easily, no thrombophlebitis, no anemia and no history of blood transfusion.   All other systems have been reviewed and are negative for complaint.     Vitals:    01/02/24 1235   BP: 102/56   Pulse: (!) 49   Resp: 14   Temp:    SpO2: 97%        Scheduled medications  cefepime, 1 g, intravenous, q12h  melatonin, 3 mg, oral, Daily  metroNIDAZOLE, 500 mg, intravenous, q8h  polyethylene glycol, 17 g, oral, Daily      Continuous medications     PRN medications  PRN medications: acetaminophen **OR** acetaminophen **OR** acetaminophen, dextrose 10 % in water (D10W), dextrose, glucagon, ondansetron **OR** ondansetron    Results from last 7 days   Lab Units 01/01/24  2131   WBC AUTO x10*3/uL 8.4   HEMOGLOBIN g/dL 13.0   HEMATOCRIT % 40.5   PLATELETS AUTO x10*3/uL 178     Results from last 7 days   Lab Units 01/02/24  1234 01/01/24  2131   SODIUM mmol/L 130* 132*   POTASSIUM mmol/L 4.2 3.9   CHLORIDE mmol/L 98 95*   CO2 mmol/L 25 25   BUN mg/dL 27* 24*   CREATININE mg/dL 1.32* 1.24*   CALCIUM mg/dL 9.4 10.9*   PROTEIN TOTAL g/dL  --  7.0   BILIRUBIN TOTAL mg/dL  --  1.2   ALK PHOS U/L  --  70   ALT U/L  --  12   AST U/L  --  19   GLUCOSE mg/dL 140* 141*            CT abdomen pelvis w IV contrast   Final Result   1.  Diffuse diverticulosis with focal thickening of the inferior wall   of the mid sigmoid: And associated small amount of ascitic fluid in   the pelvis consistent with sigmoid diverticulitis. This is similar in   location compared to 2016.  no evidence of free air or fluid   collection.   2. Moderate-sized hiatal hernia filled with fluid suggestive of   reflux esophagitis.   3. Osteopenia with age indeterminate compression deformity of the   inferior endplate of L1 new from 2016. Correlation with point   tenderness is recommended.   4. Cholelithiasis without  cholecystitis. Right greater than left   renal atrophy similar to prior.             Signed by: Saleem Gill 1/1/2024 11:48 PM   Dictation workstation:   [id number]          Physical Exam     Constitutional   General appearance: Alert   Eyes   Inspection of eyes: Sclera and conjunctiva were normal.    Pupil exam: Pupils were equal in size. Extraocular movements were intact.   Pulmonary   Respiratory assessment: No respiratory distress, normal respiratory rhythm and effort.    Auscultation of Lungs: Clear bilateral breath sounds.   Cardiovascular   Auscultation of heart: Apical pulse normal, heart rate and rhythm normal, normal S1 and S2, no murmurs and no pericardial rub.    Exam for edema: No peripheral edema.   Abdomen   Abdominal Exam: Tender left lower quadrant  Liver and Spleen exam: No hepato-splenomegaly.   Musculoskeletal   Examination of gait: Normal.    Inspection of digits and nails: No clubbing or cyanosis of the fingernails.    Inspection/palpation of joints, bones and muscles: No joint swelling. Normal movement of all extremities.   Skin   Skin inspection: Normal skin color and pigmentation, normal skin turgor and no visible rash.   Neurologic   Cranial nerves: Nerves 2-12 were intact, no focal neuro defects.   Psychiatric   Orientation: Oriented to person, place, and time.    Mood and affect: Normal.       Assessment/Plan      #Acute recurrent diverticulitis  Started IV antibiotics    #Hypotension  Start IV fluids  Hold blood pressure medicines    #Atrial fibrillation  Continue Xarelto    #Dyslipidemia  #Diabetes mellitus  Sliding-scale insulin coverage    #Hyponatremia  #Chronic kidney disease stage IIIb  Monitor while on fluids

## 2024-01-03 ENCOUNTER — APPOINTMENT (OUTPATIENT)
Dept: CARDIOLOGY | Facility: HOSPITAL | Age: 89
DRG: 392 | End: 2024-01-03
Payer: MEDICARE

## 2024-01-03 LAB
ANION GAP SERPL CALC-SCNC: 13 MMOL/L (ref 10–20)
BUN SERPL-MCNC: 31 MG/DL (ref 6–23)
CALCIUM SERPL-MCNC: 8.8 MG/DL (ref 8.6–10.3)
CHLORIDE SERPL-SCNC: 100 MMOL/L (ref 98–107)
CO2 SERPL-SCNC: 20 MMOL/L (ref 21–32)
CREAT SERPL-MCNC: 1.34 MG/DL (ref 0.5–1.05)
ERYTHROCYTE [DISTWIDTH] IN BLOOD BY AUTOMATED COUNT: 16 % (ref 11.5–14.5)
GFR SERPL CREATININE-BSD FRML MDRD: 37 ML/MIN/1.73M*2
GLUCOSE BLD MANUAL STRIP-MCNC: 127 MG/DL (ref 74–99)
GLUCOSE BLD MANUAL STRIP-MCNC: 130 MG/DL (ref 74–99)
GLUCOSE BLD MANUAL STRIP-MCNC: 89 MG/DL (ref 74–99)
GLUCOSE BLD MANUAL STRIP-MCNC: 92 MG/DL (ref 74–99)
GLUCOSE SERPL-MCNC: 84 MG/DL (ref 74–99)
HCT VFR BLD AUTO: 30.2 % (ref 36–46)
HGB BLD-MCNC: 9.5 G/DL (ref 12–16)
MCH RBC QN AUTO: 27.3 PG (ref 26–34)
MCHC RBC AUTO-ENTMCNC: 31.5 G/DL (ref 32–36)
MCV RBC AUTO: 87 FL (ref 80–100)
NRBC BLD-RTO: 0 /100 WBCS (ref 0–0)
PLATELET # BLD AUTO: 124 X10*3/UL (ref 150–450)
POTASSIUM SERPL-SCNC: 3.9 MMOL/L (ref 3.5–5.3)
RBC # BLD AUTO: 3.48 X10*6/UL (ref 4–5.2)
SODIUM SERPL-SCNC: 129 MMOL/L (ref 136–145)
WBC # BLD AUTO: 8.1 X10*3/UL (ref 4.4–11.3)

## 2024-01-03 PROCEDURE — 2500000001 HC RX 250 WO HCPCS SELF ADMINISTERED DRUGS (ALT 637 FOR MEDICARE OP): Performed by: INTERNAL MEDICINE

## 2024-01-03 PROCEDURE — 2500000004 HC RX 250 GENERAL PHARMACY W/ HCPCS (ALT 636 FOR OP/ED): Performed by: INTERNAL MEDICINE

## 2024-01-03 PROCEDURE — 36415 COLL VENOUS BLD VENIPUNCTURE: CPT | Performed by: NURSE PRACTITIONER

## 2024-01-03 PROCEDURE — 93005 ELECTROCARDIOGRAM TRACING: CPT

## 2024-01-03 PROCEDURE — 1100000001 HC PRIVATE ROOM DAILY

## 2024-01-03 PROCEDURE — 2500000001 HC RX 250 WO HCPCS SELF ADMINISTERED DRUGS (ALT 637 FOR MEDICARE OP): Performed by: NURSE PRACTITIONER

## 2024-01-03 PROCEDURE — 80048 BASIC METABOLIC PNL TOTAL CA: CPT | Performed by: NURSE PRACTITIONER

## 2024-01-03 PROCEDURE — 99232 SBSQ HOSP IP/OBS MODERATE 35: CPT | Performed by: INTERNAL MEDICINE

## 2024-01-03 PROCEDURE — 85027 COMPLETE CBC AUTOMATED: CPT | Performed by: NURSE PRACTITIONER

## 2024-01-03 PROCEDURE — 2500000004 HC RX 250 GENERAL PHARMACY W/ HCPCS (ALT 636 FOR OP/ED): Performed by: NURSE PRACTITIONER

## 2024-01-03 PROCEDURE — C9113 INJ PANTOPRAZOLE SODIUM, VIA: HCPCS | Performed by: INTERNAL MEDICINE

## 2024-01-03 PROCEDURE — 82947 ASSAY GLUCOSE BLOOD QUANT: CPT

## 2024-01-03 RX ADMIN — METRONIDAZOLE 500 MG: 500 INJECTION, SOLUTION INTRAVENOUS at 17:04

## 2024-01-03 RX ADMIN — ROSUVASTATIN 5 MG: 10 TABLET, FILM COATED ORAL at 21:34

## 2024-01-03 RX ADMIN — RIVAROXABAN 15 MG: 15 TABLET, FILM COATED ORAL at 17:04

## 2024-01-03 RX ADMIN — METRONIDAZOLE 500 MG: 500 INJECTION, SOLUTION INTRAVENOUS at 09:17

## 2024-01-03 RX ADMIN — PANTOPRAZOLE SODIUM 40 MG: 40 INJECTION, POWDER, FOR SOLUTION INTRAVENOUS at 06:00

## 2024-01-03 RX ADMIN — CEFEPIME 1 G: 1 INJECTION, POWDER, FOR SOLUTION INTRAMUSCULAR; INTRAVENOUS at 09:17

## 2024-01-03 RX ADMIN — ACETAMINOPHEN 650 MG: 325 TABLET ORAL at 17:04

## 2024-01-03 RX ADMIN — CEFEPIME 1 G: 1 INJECTION, POWDER, FOR SOLUTION INTRAMUSCULAR; INTRAVENOUS at 21:41

## 2024-01-03 RX ADMIN — ACETAMINOPHEN 650 MG: 325 TABLET ORAL at 00:38

## 2024-01-03 RX ADMIN — SODIUM CHLORIDE 75 ML/HR: 9 INJECTION, SOLUTION INTRAVENOUS at 06:01

## 2024-01-03 RX ADMIN — POLYETHYLENE GLYCOL 3350 17 G: 17 POWDER, FOR SOLUTION ORAL at 09:17

## 2024-01-03 RX ADMIN — PSYLLIUM HUSK 1 PACKET: 3.4 POWDER ORAL at 09:17

## 2024-01-03 RX ADMIN — Medication 3 MG: at 21:34

## 2024-01-03 ASSESSMENT — COGNITIVE AND FUNCTIONAL STATUS - GENERAL
WALKING IN HOSPITAL ROOM: A LITTLE
DAILY ACTIVITIY SCORE: 18
HELP NEEDED FOR BATHING: A LITTLE
TURNING FROM BACK TO SIDE WHILE IN FLAT BAD: A LITTLE
PERSONAL GROOMING: A LITTLE
DRESSING REGULAR UPPER BODY CLOTHING: A LITTLE
WALKING IN HOSPITAL ROOM: A LITTLE
MOBILITY SCORE: 21
CLIMB 3 TO 5 STEPS WITH RAILING: A LITTLE
EATING MEALS: A LITTLE
MOBILITY SCORE: 18
MOVING TO AND FROM BED TO CHAIR: A LITTLE
CLIMB 3 TO 5 STEPS WITH RAILING: A LITTLE
DAILY ACTIVITIY SCORE: 24
TOILETING: A LITTLE
MOVING FROM LYING ON BACK TO SITTING ON SIDE OF FLAT BED WITH BEDRAILS: A LITTLE
STANDING UP FROM CHAIR USING ARMS: A LITTLE
DRESSING REGULAR LOWER BODY CLOTHING: A LITTLE
STANDING UP FROM CHAIR USING ARMS: A LITTLE

## 2024-01-03 ASSESSMENT — PAIN SCALES - GENERAL
PAINLEVEL_OUTOF10: 0 - NO PAIN

## 2024-01-03 ASSESSMENT — PAIN - FUNCTIONAL ASSESSMENT
PAIN_FUNCTIONAL_ASSESSMENT: 0-10
PAIN_FUNCTIONAL_ASSESSMENT: 0-10

## 2024-01-03 NOTE — PROGRESS NOTES
Met with family and patient at bedside. Family provided living will and HCPOA paperwork. Paperwork placed in chart. Patient requested that her grand daughter, who is a RN be added to her contacts. Information updated in chart.

## 2024-01-03 NOTE — PROGRESS NOTES
Aicha Lord is a 89 y.o. female     Had a large bowel movement that was mostly diarrhea  Now with positive blood cultures    Review of Systems     Constitutional: no fever, no chills, not feeling poorly, not feeling tired   Cardiovascular: no chest pain   Respiratory: no cough, wheezing or shortness of breath a  Gastrointestinal:   Neurological: no headache,   All other systems have been reviewed and are negative for complaint.       Vitals:    01/03/24 0714   BP: 109/55   Pulse: 58   Resp: 18   Temp: 36.4 °C (97.5 °F)   SpO2: 95%        Scheduled medications  cefepime, 1 g, intravenous, q12h  [Held by provider] furosemide, 40 mg, oral, Daily  melatonin, 3 mg, oral, Daily  metroNIDAZOLE, 500 mg, intravenous, q8h  pantoprazole, 40 mg, oral, Daily before breakfast   Or  pantoprazole, 40 mg, intravenous, Daily before breakfast  polyethylene glycol, 17 g, oral, Daily  psyllium, 1 packet, oral, Daily  rivaroxaban, 15 mg, oral, Daily with evening meal  rosuvastatin, 5 mg, oral, Nightly  [Held by provider] spironolactone, 25 mg, oral, Daily      Continuous medications  sodium chloride 0.9%, 75 mL/hr, Last Rate: 75 mL/hr (01/03/24 0601)      PRN medications  PRN medications: acetaminophen **OR** acetaminophen **OR** acetaminophen, dextrose 10 % in water (D10W), dextrose, glucagon, guaiFENesin, ondansetron **OR** ondansetron    Lab Review   Results from last 7 days   Lab Units 01/03/24  0803 01/01/24  2131   WBC AUTO x10*3/uL 8.1 8.4   HEMOGLOBIN g/dL 9.5* 13.0   HEMATOCRIT % 30.2* 40.5   PLATELETS AUTO x10*3/uL 124* 178     Results from last 7 days   Lab Units 01/03/24  0803 01/02/24  1234 01/01/24  2131   SODIUM mmol/L 129* 130* 132*   POTASSIUM mmol/L 3.9 4.2 3.9   CHLORIDE mmol/L 100 98 95*   CO2 mmol/L 20* 25 25   BUN mg/dL 31* 27* 24*   CREATININE mg/dL 1.34* 1.32* 1.24*   CALCIUM mg/dL 8.8 9.4 10.9*   PROTEIN TOTAL g/dL  --   --  7.0   BILIRUBIN TOTAL mg/dL  --   --  1.2   ALK PHOS U/L  --   --  70   ALT U/L  --    --  12   AST U/L  --   --  19   GLUCOSE mg/dL 84 140* 141*            CT abdomen pelvis w IV contrast   Final Result   1.  Diffuse diverticulosis with focal thickening of the inferior wall   of the mid sigmoid: And associated small amount of ascitic fluid in   the pelvis consistent with sigmoid diverticulitis. This is similar in   location compared to 2016.  no evidence of free air or fluid   collection.   2. Moderate-sized hiatal hernia filled with fluid suggestive of   reflux esophagitis.   3. Osteopenia with age indeterminate compression deformity of the   inferior endplate of L1 new from 2016. Correlation with point   tenderness is recommended.   4. Cholelithiasis without cholecystitis. Right greater than left   renal atrophy similar to prior.             Signed by: Saleem Gill 1/1/2024 11:48 PM   Dictation workstation:   [id number]            Physical Exam     Constitutional   General appearance: Alert and in no acute distress.     Pulmonary   Respiratory assessment: No respiratory distress, normal respiratory rhythm and effort.    Auscultation of Lungs: Clear bilateral breath sounds.   Cardiovascular   Auscultation of heart: Apical pulse normal, heart rate and rhythm normal, normal S1 and S2, no murmurs and no pericardial rub.    Exam for edema: No peripheral edema.   Abdomen   Abdominal Exam: Left lower quadrant tenderness  Liver and Spleen exam: No hepato-splenomegaly.   Musculoskeletal   Examination of gait: ROM intact  Skin inspection: Normal skin color and pigmentation, normal skin turgor and no visible rash.   Neurologic   Cranial nerves: Nerves 2-12 were intact, no focal neuro defects.     Assessment/Plan      #Acute recurrent diverticulitis  #Bacteremia  Continue IV antibiotics     #Hypotension  Hydration  Monitor for fluid overload     #Atrial fibrillation  Continue Xarelto     #Dyslipidemia  #Diabetes mellitus  Sliding-scale insulin coverage     #Hyponatremia  #Chronic kidney disease stage  IIIb  Monitor while on fluids

## 2024-01-03 NOTE — CONSULTS
"INFECTIOUS DISEASE INPATIENT INITIAL CONSULTATION    Referred By: Marian Perez    Reason For Consult: bacteremia    HPI:  This is a 89 y.o. female with PMH of A. Fib, DM II, DLD, CKD who presented with sudden LLQ pain.    Sudden onset pain in the LLQ. No fever/chills. No vomiting/diarrhea.    Tmax 100.8 here. WBC has been normal. CT A/P with sigmoid diverticulitis. Blood cx positive for E. Coli. On IV Cefepime and Flagyl.    Still with some pain now and loose stools have started. No frequent/watery diarrhea. Feels achy/tired in general.    Allergies:  Celecoxib, Ciprofloxacin, Penicillins, Promethazine, Statins-hmg-coa reductase inhibitors, Sulfamethoxazole-trimethoprim, and Tramadol     Vitals (Last 24 Hours):  Heart Rate:  [58-68]   Temp:  [36.4 °C (97.5 °F)-38.2 °C (100.8 °F)]   Resp:  [18]   BP: (102-125)/(55-69)   Height:  [149.9 cm (4' 11\")]   Weight:  [61.7 kg (136 lb)]   SpO2:  [93 %-95 %]      PHYSICAL EXAM:  Gen - NAD  Heart - RRR  Lungs - clear bilaterally, no wheezing  Abd - soft, no distension, some pain in the LLQ, BS present  Ext - no LE edema  Skin - no rash    MEDS:    Current Facility-Administered Medications:     acetaminophen (Tylenol) tablet 650 mg, 650 mg, oral, q4h PRN, 650 mg at 01/03/24 0038 **OR** acetaminophen (Tylenol) oral liquid 650 mg, 650 mg, oral, q4h PRN **OR** acetaminophen (Tylenol) suppository 650 mg, 650 mg, rectal, q4h PRN, Marian Perez MD    cefepime (Maxipime) 1 g in dextrose 5 % 50 mL IV, 1 g, intravenous, q12h, [facility], Stopped at 01/03/24 0947    dextrose 10 % in water (D10W) infusion, 0.3 g/kg/hr, intravenous, Once PRN, [facility]    dextrose 50 % injection 25 g, 25 g, intravenous, q15 min PRN, [facility]    [Held by provider] furosemide (Lasix) tablet 40 mg, 40 mg, oral, Daily, [facility]    glucagon (Glucagen) injection 1 mg, 1 mg, intramuscular, q15 min PRN, [facility]    guaiFENesin (Mucinex) " 12 hr tablet 600 mg, 600 mg, oral, q12h PRN, Marian Perez MD    melatonin tablet 3 mg, 3 mg, oral, Daily, [facility], 3 mg at 01/02/24 2142    metroNIDAZOLE (Flagyl) 500 mg in NaCl (iso-os) 100 mL, 500 mg, intravenous, q8h, [facility], Stopped at 01/03/24 1017    ondansetron (Zofran) tablet 4 mg, 4 mg, oral, q8h PRN **OR** ondansetron (Zofran) injection 4 mg, 4 mg, intravenous, q8h PRN, [facility]    pantoprazole (ProtoNix) EC tablet 40 mg, 40 mg, oral, Daily before breakfast **OR** pantoprazole (ProtoNix) injection 40 mg, 40 mg, intravenous, Daily before breakfast, Marian Perez MD, 40 mg at 01/03/24 0600    polyethylene glycol (Glycolax, Miralax) packet 17 g, 17 g, oral, Daily, [facility], 17 g at 01/03/24 0917    psyllium (Metamucil) 3.4 gram packet 1 packet, 1 packet, oral, Daily, Marian Perez MD, 1 packet at 01/03/24 0917    rivaroxaban (Xarelto) tablet 15 mg, 15 mg, oral, Daily with evening meal, Marian Perez MD, 15 mg at 01/02/24 1846    rosuvastatin (Crestor) tablet 5 mg, 5 mg, oral, Nightly, Marian Perez MD, 5 mg at 01/02/24 2142    sodium chloride 0.9% infusion, 75 mL/hr, intravenous, Continuous, Marian Perez MD, Last Rate: 75 mL/hr at 01/03/24 0601, 75 mL/hr at 01/03/24 0601    [Held by provider] spironolactone (Aldactone) tablet 25 mg, 25 mg, oral, Daily, [facility]     LABS:  Lab Results   Component Value Date    WBC 8.1 01/03/2024    HGB 9.5 (L) 01/03/2024    HCT 30.2 (L) 01/03/2024    MCV 87 01/03/2024     (L) 01/03/2024      Results from last 72 hours   Lab Units 01/03/24  0803   SODIUM mmol/L 129*   POTASSIUM mmol/L 3.9   CHLORIDE mmol/L 100   CO2 mmol/L 20*   BUN mg/dL 31*   CREATININE mg/dL 1.34*   GLUCOSE mg/dL 84   CALCIUM mg/dL 8.8   ANION GAP mmol/L 13   EGFR mL/min/1.73m*2 37*     Results from last 72 hours   Lab Units 01/01/24  2131   ALK PHOS U/L 70   BILIRUBIN TOTAL mg/dL 1.2   BILIRUBIN DIRECT mg/dL  0.2   PROTEIN TOTAL g/dL 7.0   ALT U/L 12   AST U/L 19   ALBUMIN g/dL 4.1     Estimated Creatinine Clearance: 19.9 mL/min (A) (by C-G formula based on SCr of 1.34 mg/dL (H)).     IMAGING:  CT A/P 1/1  Impression:     1.  Diffuse diverticulosis with focal thickening of the inferior wall  of the mid sigmoid: And associated small amount of ascitic fluid in  the pelvis consistent with sigmoid diverticulitis. This is similar in  location compared to 2016.  no evidence of free air or fluid  collection.  2. Moderate-sized hiatal hernia filled with fluid suggestive of  reflux esophagitis.  3. Osteopenia with age indeterminate compression deformity of the  inferior endplate of L1 new from 2016. Correlation with point  tenderness is recommended.  4. Cholelithiasis without cholecystitis. Right greater than left  renal atrophy similar to prior.       ASSESSMENT/PLAN:    Complicated Sigmoid Diverticulitis  Bacteremia due to E. Coli  Multiple Abx Allergies - penicillin, cipro, bactrim. Limits abx options.  CKD - CrCl 19, affects abx dosing    IV Cefepime and Flagyl.     Monitoring for adverse effects of abx such as rash/itching/diarrhea.    Will follow. Thanks!    Maninder Alcala MD  [facility]  Office #[phone]

## 2024-01-03 NOTE — CARE PLAN
The patient's goals for the shift include  having pain remain controled    The clinical goals for the shift include patietn will remain hemodynamically stable    Over the shift, the patient did not make progress toward the following goals. Barriers to progression include ***. Recommendations to address these barriers include ***.

## 2024-01-04 LAB
ANION GAP SERPL CALC-SCNC: 11 MMOL/L (ref 10–20)
BUN SERPL-MCNC: 24 MG/DL (ref 6–23)
CALCIUM SERPL-MCNC: 9 MG/DL (ref 8.6–10.3)
CHLORIDE SERPL-SCNC: 105 MMOL/L (ref 98–107)
CO2 SERPL-SCNC: 20 MMOL/L (ref 21–32)
CREAT SERPL-MCNC: 1.13 MG/DL (ref 0.5–1.05)
ERYTHROCYTE [DISTWIDTH] IN BLOOD BY AUTOMATED COUNT: 16.2 % (ref 11.5–14.5)
GFR SERPL CREATININE-BSD FRML MDRD: 45 ML/MIN/1.73M*2
GLUCOSE BLD MANUAL STRIP-MCNC: 108 MG/DL (ref 74–99)
GLUCOSE BLD MANUAL STRIP-MCNC: 156 MG/DL (ref 74–99)
GLUCOSE BLD MANUAL STRIP-MCNC: 91 MG/DL (ref 74–99)
GLUCOSE SERPL-MCNC: 84 MG/DL (ref 74–99)
HCT VFR BLD AUTO: 31 % (ref 36–46)
HGB BLD-MCNC: 9.8 G/DL (ref 12–16)
HOLD SPECIMEN: NORMAL
MCH RBC QN AUTO: 27.8 PG (ref 26–34)
MCHC RBC AUTO-ENTMCNC: 31.6 G/DL (ref 32–36)
MCV RBC AUTO: 88 FL (ref 80–100)
NRBC BLD-RTO: 0 /100 WBCS (ref 0–0)
PLATELET # BLD AUTO: 129 X10*3/UL (ref 150–450)
POTASSIUM SERPL-SCNC: 3.8 MMOL/L (ref 3.5–5.3)
RBC # BLD AUTO: 3.53 X10*6/UL (ref 4–5.2)
SODIUM SERPL-SCNC: 132 MMOL/L (ref 136–145)
WBC # BLD AUTO: 6.2 X10*3/UL (ref 4.4–11.3)

## 2024-01-04 PROCEDURE — 99232 SBSQ HOSP IP/OBS MODERATE 35: CPT | Performed by: INTERNAL MEDICINE

## 2024-01-04 PROCEDURE — 2500000001 HC RX 250 WO HCPCS SELF ADMINISTERED DRUGS (ALT 637 FOR MEDICARE OP): Performed by: NURSE PRACTITIONER

## 2024-01-04 PROCEDURE — 85027 COMPLETE CBC AUTOMATED: CPT | Performed by: NURSE PRACTITIONER

## 2024-01-04 PROCEDURE — 2500000004 HC RX 250 GENERAL PHARMACY W/ HCPCS (ALT 636 FOR OP/ED): Performed by: NURSE PRACTITIONER

## 2024-01-04 PROCEDURE — 36415 COLL VENOUS BLD VENIPUNCTURE: CPT | Performed by: NURSE PRACTITIONER

## 2024-01-04 PROCEDURE — 82947 ASSAY GLUCOSE BLOOD QUANT: CPT

## 2024-01-04 PROCEDURE — 80048 BASIC METABOLIC PNL TOTAL CA: CPT | Performed by: NURSE PRACTITIONER

## 2024-01-04 PROCEDURE — 2500000004 HC RX 250 GENERAL PHARMACY W/ HCPCS (ALT 636 FOR OP/ED): Performed by: INTERNAL MEDICINE

## 2024-01-04 PROCEDURE — 1100000001 HC PRIVATE ROOM DAILY

## 2024-01-04 RX ORDER — ALBUTEROL SULFATE 90 UG/1
2 AEROSOL, METERED RESPIRATORY (INHALATION) EVERY 4 HOURS PRN
Status: DISCONTINUED | OUTPATIENT
Start: 2024-01-04 | End: 2024-01-04

## 2024-01-04 RX ORDER — ALBUTEROL SULFATE 0.83 MG/ML
2.5 SOLUTION RESPIRATORY (INHALATION) EVERY 4 HOURS PRN
Status: DISCONTINUED | OUTPATIENT
Start: 2024-01-04 | End: 2024-01-12 | Stop reason: HOSPADM

## 2024-01-04 RX ORDER — PREGABALIN 25 MG/1
25 CAPSULE ORAL DAILY
Status: DISCONTINUED | OUTPATIENT
Start: 2024-01-04 | End: 2024-01-12 | Stop reason: HOSPADM

## 2024-01-04 RX ADMIN — METRONIDAZOLE 500 MG: 500 INJECTION, SOLUTION INTRAVENOUS at 01:34

## 2024-01-04 RX ADMIN — PANTOPRAZOLE SODIUM 40 MG: 40 TABLET, DELAYED RELEASE ORAL at 06:14

## 2024-01-04 RX ADMIN — ACETAMINOPHEN 650 MG: 325 TABLET ORAL at 21:46

## 2024-01-04 RX ADMIN — METRONIDAZOLE 500 MG: 500 INJECTION, SOLUTION INTRAVENOUS at 09:39

## 2024-01-04 RX ADMIN — Medication 3 MG: at 19:00

## 2024-01-04 RX ADMIN — METRONIDAZOLE 500 MG: 500 INJECTION, SOLUTION INTRAVENOUS at 15:39

## 2024-01-04 RX ADMIN — ROSUVASTATIN 5 MG: 10 TABLET, FILM COATED ORAL at 21:00

## 2024-01-04 RX ADMIN — CEFEPIME 1 G: 1 INJECTION, POWDER, FOR SOLUTION INTRAMUSCULAR; INTRAVENOUS at 09:39

## 2024-01-04 RX ADMIN — RIVAROXABAN 15 MG: 15 TABLET, FILM COATED ORAL at 15:39

## 2024-01-04 RX ADMIN — CEFEPIME 1 G: 1 INJECTION, POWDER, FOR SOLUTION INTRAMUSCULAR; INTRAVENOUS at 21:48

## 2024-01-04 ASSESSMENT — COGNITIVE AND FUNCTIONAL STATUS - GENERAL
DRESSING REGULAR LOWER BODY CLOTHING: A LITTLE
TOILETING: A LITTLE
MOBILITY SCORE: 18
HELP NEEDED FOR BATHING: A LITTLE
TURNING FROM BACK TO SIDE WHILE IN FLAT BAD: A LITTLE
PERSONAL GROOMING: A LITTLE
WALKING IN HOSPITAL ROOM: A LITTLE
CLIMB 3 TO 5 STEPS WITH RAILING: A LOT
STANDING UP FROM CHAIR USING ARMS: A LITTLE
DAILY ACTIVITIY SCORE: 20
MOVING TO AND FROM BED TO CHAIR: A LITTLE

## 2024-01-04 ASSESSMENT — PAIN SCALES - GENERAL: PAINLEVEL_OUTOF10: 3

## 2024-01-04 ASSESSMENT — PAIN - FUNCTIONAL ASSESSMENT: PAIN_FUNCTIONAL_ASSESSMENT: 0-10

## 2024-01-04 ASSESSMENT — PAIN DESCRIPTION - LOCATION: LOCATION: SHOULDER

## 2024-01-04 NOTE — PROGRESS NOTES
Aicha Lord is a 89 y.o. female     Had another bout of diarrhea  But seems to be improving    Review of Systems     Constitutional: no fever, no chills, not feeling poorly, not feeling tired   Cardiovascular: no chest pain   Respiratory: no cough, wheezing or shortness of breath a  Gastrointestinal:   Neurological: no headache,   All other systems have been reviewed and are negative for complaint.       Vitals:    01/04/24 1217   BP: 114/58   Pulse: 51   Resp: 18   Temp: 36.9 °C (98.5 °F)   SpO2: 98%        Scheduled medications  cefepime, 1 g, intravenous, q12h  [Held by provider] furosemide, 40 mg, oral, Daily  melatonin, 3 mg, oral, Daily  metroNIDAZOLE, 500 mg, intravenous, q8h  pantoprazole, 40 mg, oral, Daily before breakfast   Or  pantoprazole, 40 mg, intravenous, Daily before breakfast  polyethylene glycol, 17 g, oral, Daily  psyllium, 1 packet, oral, Daily  rivaroxaban, 15 mg, oral, Daily with evening meal  rosuvastatin, 5 mg, oral, Nightly  [Held by provider] spironolactone, 25 mg, oral, Daily      Continuous medications  sodium chloride 0.9%, 75 mL/hr, Last Rate: 75 mL/hr (01/03/24 9968)      PRN medications  PRN medications: acetaminophen **OR** acetaminophen **OR** acetaminophen, dextrose 10 % in water (D10W), dextrose, glucagon, guaiFENesin, ondansetron **OR** ondansetron    Lab Review   Results from last 7 days   Lab Units 01/04/24  0633 01/03/24  0803 01/01/24  2131   WBC AUTO x10*3/uL 6.2 8.1 8.4   HEMOGLOBIN g/dL 9.8* 9.5* 13.0   HEMATOCRIT % 31.0* 30.2* 40.5   PLATELETS AUTO x10*3/uL 129* 124* 178       Results from last 7 days   Lab Units 01/04/24  0633 01/03/24  0803 01/02/24  1234 01/01/24  2131   SODIUM mmol/L 132* 129* 130* 132*   POTASSIUM mmol/L 3.8 3.9 4.2 3.9   CHLORIDE mmol/L 105 100 98 95*   CO2 mmol/L 20* 20* 25 25   BUN mg/dL 24* 31* 27* 24*   CREATININE mg/dL 1.13* 1.34* 1.32* 1.24*   CALCIUM mg/dL 9.0 8.8 9.4 10.9*   PROTEIN TOTAL g/dL  --   --   --  7.0   BILIRUBIN TOTAL  mg/dL  --   --   --  1.2   ALK PHOS U/L  --   --   --  70   ALT U/L  --   --   --  12   AST U/L  --   --   --  19   GLUCOSE mg/dL 84 84 140* 141*              CT abdomen pelvis w IV contrast   Final Result   1.  Diffuse diverticulosis with focal thickening of the inferior wall   of the mid sigmoid: And associated small amount of ascitic fluid in   the pelvis consistent with sigmoid diverticulitis. This is similar in   location compared to 2016.  no evidence of free air or fluid   collection.   2. Moderate-sized hiatal hernia filled with fluid suggestive of   reflux esophagitis.   3. Osteopenia with age indeterminate compression deformity of the   inferior endplate of L1 new from 2016. Correlation with point   tenderness is recommended.   4. Cholelithiasis without cholecystitis. Right greater than left   renal atrophy similar to prior.             Signed by: Saleem Gill 1/1/2024 11:48 PM   Dictation workstation:   [id number]            Physical Exam     Constitutional   General appearance: Alert and in no acute distress.     Pulmonary   Respiratory assessment: No respiratory distress, normal respiratory rhythm and effort.    Auscultation of Lungs: Clear bilateral breath sounds.   Cardiovascular   Auscultation of heart: Apical pulse normal, heart rate and rhythm normal, normal S1 and S2, no murmurs and no pericardial rub.    Exam for edema: No peripheral edema.   Abdomen   Abdominal Exam: Left lower quadrant tenderness  Liver and Spleen exam: No hepato-splenomegaly.   Musculoskeletal   Examination of gait: ROM intact  Skin inspection: Normal skin color and pigmentation, normal skin turgor and no visible rash.   Neurologic   Cranial nerves: Nerves 2-12 were intact, no focal neuro defects.     Assessment/Plan      #Acute recurrent diverticulitis  #Bacteremia  Continue IV antibiotics  Start soft diet     #Atrial fibrillation  Continue Xarelto     #Dyslipidemia  #Diabetes mellitus  Sliding-scale insulin coverage      #Hyponatremia  #Chronic kidney disease stage IIIb  Monitor while on fluids

## 2024-01-04 NOTE — ED PROCEDURE NOTE
Procedure  Critical Care    Performed by: Jun Hicks MD  Authorized by: Jun Hicks MD    Critical care provider statement:     Critical care time (minutes):  34    Critical care time was exclusive of:  Separately billable procedures and treating other patients and teaching time    Critical care was necessary to treat or prevent imminent or life-threatening deterioration of the following conditions:  Sepsis    Critical care was time spent personally by me on the following activities:  Evaluation of patient's response to treatment, examination of patient, obtaining history from patient or surrogate, ordering and performing treatments and interventions, ordering and review of laboratory studies, ordering and review of radiographic studies, re-evaluation of patient's condition, review of old charts and pulse oximetry    I assumed direction of critical care for this patient from another provider in my specialty: no      Care discussed with: admitting provider                 Jun Hicks MD  01/03/24 2027

## 2024-01-04 NOTE — PROGRESS NOTES
"  INFECTIOUS DISEASE DAILY PROGRESS NOTE    SUBJECTIVE:    No overnight events. No new complaints. Afebrile. No rash/itching/diarrhea. Abd pain better, some intermittent LLQ cramping still.    OBJECTIVE:  VITALS (Last 24 Hours)  /67   Pulse 51   Temp 36.9 °C (98.4 °F)   Resp 18   Ht 1.499 m (4' 11\")   Wt 61.7 kg (136 lb)   SpO2 98%   BMI 27.47 kg/m²     PHYSICAL EXAM:  Gen - NAD  Abd - soft, no distension, not ttp, BS present  Skin - no rashes    ABX: IV Cefepime/Flagyl    LABS:  Lab Results   Component Value Date    WBC 6.2 01/04/2024    HGB 9.8 (L) 01/04/2024    HCT 31.0 (L) 01/04/2024    MCV 88 01/04/2024     (L) 01/04/2024     Lab Results   Component Value Date    GLUCOSE 84 01/04/2024    CALCIUM 9.0 01/04/2024     (L) 01/04/2024    K 3.8 01/04/2024    CO2 20 (L) 01/04/2024     01/04/2024    BUN 24 (H) 01/04/2024    CREATININE 1.13 (H) 01/04/2024     Results from last 72 hours   Lab Units 01/01/24  2131   ALK PHOS U/L 70   BILIRUBIN TOTAL mg/dL 1.2   BILIRUBIN DIRECT mg/dL 0.2   PROTEIN TOTAL g/dL 7.0   ALT U/L 12   AST U/L 19   ALBUMIN g/dL 4.1     Estimated Creatinine Clearance: 23.6 mL/min (A) (by C-G formula based on SCr of 1.13 mg/dL (H)).    ASSESSMENT/PLAN:     Complicated Sigmoid Diverticulitis  Bacteremia due to E. Coli  Multiple Abx Allergies - penicillin, cipro, bactrim. Limits abx options.  CKD - CrCl 19, affects abx dosing     IV Cefepime and Flagyl. Awaiting sensis on E. Coli to determine abx plans. Hopefully will have a good cephalosporin option orally given her allergies.     Monitoring for adverse effects of abx such as rash/itching/diarrhea.     Will follow. Thanks!    Maninder Alcala MD  [facility]  Office #[phone]      "

## 2024-01-04 NOTE — PROGRESS NOTES
Care Coordinator Note:  Plan: IV abx d/t diverticulitis ID following  Disposition: home with son    Glenny MCN, RN [facility]

## 2024-01-04 NOTE — CARE PLAN
The patient's goals for the shift include  getting some rest    The clinical goals for the shift include patietn will remain hemodynamically stable    Over the shift, the patient did not make progress toward the following goals. Barriers to progression include ***. Recommendations to address these barriers include ***.

## 2024-01-05 ENCOUNTER — APPOINTMENT (OUTPATIENT)
Dept: CARDIOLOGY | Facility: HOSPITAL | Age: 89
DRG: 392 | End: 2024-01-05
Payer: MEDICARE

## 2024-01-05 LAB
ANION GAP SERPL CALC-SCNC: 14 MMOL/L (ref 10–20)
AORTIC VALVE MEAN GRADIENT: 8
AORTIC VALVE PEAK VELOCITY: 1.93
AV PEAK GRADIENT: 14.9
AVA (PEAK VEL): 1.62
AVA (VTI): 1.4
BUN SERPL-MCNC: 21 MG/DL (ref 6–23)
CALCIUM SERPL-MCNC: 8.8 MG/DL (ref 8.6–10.3)
CHLORIDE SERPL-SCNC: 106 MMOL/L (ref 98–107)
CO2 SERPL-SCNC: 16 MMOL/L (ref 21–32)
CREAT SERPL-MCNC: 1.05 MG/DL (ref 0.5–1.05)
EJECTION FRACTION APICAL 4 CHAMBER: 56.1
EJECTION FRACTION: 56
ERYTHROCYTE [DISTWIDTH] IN BLOOD BY AUTOMATED COUNT: 16.3 % (ref 11.5–14.5)
GFR SERPL CREATININE-BSD FRML MDRD: 50 ML/MIN/1.73M*2
GLUCOSE BLD MANUAL STRIP-MCNC: 172 MG/DL (ref 74–99)
GLUCOSE BLD MANUAL STRIP-MCNC: 93 MG/DL (ref 74–99)
GLUCOSE SERPL-MCNC: 90 MG/DL (ref 74–99)
HCT VFR BLD AUTO: 34.8 % (ref 36–46)
HGB BLD-MCNC: 10.7 G/DL (ref 12–16)
LEFT ATRIUM VOLUME AREA LENGTH INDEX BSA: 62.9
LEFT VENTRICLE INTERNAL DIMENSION DIASTOLE: 3.9 (ref 3.5–6)
LEFT VENTRICULAR OUTFLOW TRACT DIAMETER: 1.8
MCH RBC QN AUTO: 28 PG (ref 26–34)
MCHC RBC AUTO-ENTMCNC: 30.7 G/DL (ref 32–36)
MCV RBC AUTO: 91 FL (ref 80–100)
NRBC BLD-RTO: 0 /100 WBCS (ref 0–0)
PLATELET # BLD AUTO: 152 X10*3/UL (ref 150–450)
POTASSIUM SERPL-SCNC: 3.7 MMOL/L (ref 3.5–5.3)
RBC # BLD AUTO: 3.82 X10*6/UL (ref 4–5.2)
RIGHT VENTRICLE FREE WALL PEAK S': 8.97
RIGHT VENTRICLE PEAK SYSTOLIC PRESSURE: 48.2
SODIUM SERPL-SCNC: 132 MMOL/L (ref 136–145)
TRICUSPID ANNULAR PLANE SYSTOLIC EXCURSION: 1.1
WBC # BLD AUTO: 5.5 X10*3/UL (ref 4.4–11.3)

## 2024-01-05 PROCEDURE — 87040 BLOOD CULTURE FOR BACTERIA: CPT | Mod: AHULAB | Performed by: INTERNAL MEDICINE

## 2024-01-05 PROCEDURE — 2500000001 HC RX 250 WO HCPCS SELF ADMINISTERED DRUGS (ALT 637 FOR MEDICARE OP): Performed by: NURSE PRACTITIONER

## 2024-01-05 PROCEDURE — 93306 TTE W/DOPPLER COMPLETE: CPT | Performed by: INTERNAL MEDICINE

## 2024-01-05 PROCEDURE — 1100000001 HC PRIVATE ROOM DAILY

## 2024-01-05 PROCEDURE — 2500000004 HC RX 250 GENERAL PHARMACY W/ HCPCS (ALT 636 FOR OP/ED): Performed by: NURSE PRACTITIONER

## 2024-01-05 PROCEDURE — 93306 TTE W/DOPPLER COMPLETE: CPT

## 2024-01-05 PROCEDURE — 2500000001 HC RX 250 WO HCPCS SELF ADMINISTERED DRUGS (ALT 637 FOR MEDICARE OP): Performed by: INTERNAL MEDICINE

## 2024-01-05 PROCEDURE — 99232 SBSQ HOSP IP/OBS MODERATE 35: CPT | Performed by: INTERNAL MEDICINE

## 2024-01-05 PROCEDURE — 36415 COLL VENOUS BLD VENIPUNCTURE: CPT | Performed by: NURSE PRACTITIONER

## 2024-01-05 PROCEDURE — 2500000004 HC RX 250 GENERAL PHARMACY W/ HCPCS (ALT 636 FOR OP/ED): Performed by: PHYSICIAN ASSISTANT

## 2024-01-05 PROCEDURE — 2500000004 HC RX 250 GENERAL PHARMACY W/ HCPCS (ALT 636 FOR OP/ED): Performed by: INTERNAL MEDICINE

## 2024-01-05 PROCEDURE — 80048 BASIC METABOLIC PNL TOTAL CA: CPT | Performed by: NURSE PRACTITIONER

## 2024-01-05 PROCEDURE — 85027 COMPLETE CBC AUTOMATED: CPT | Performed by: NURSE PRACTITIONER

## 2024-01-05 PROCEDURE — 2500000001 HC RX 250 WO HCPCS SELF ADMINISTERED DRUGS (ALT 637 FOR MEDICARE OP): Performed by: PHYSICIAN ASSISTANT

## 2024-01-05 PROCEDURE — 82947 ASSAY GLUCOSE BLOOD QUANT: CPT

## 2024-01-05 PROCEDURE — 36415 COLL VENOUS BLD VENIPUNCTURE: CPT | Performed by: INTERNAL MEDICINE

## 2024-01-05 RX ORDER — METRONIDAZOLE 500 MG/1
500 TABLET ORAL EVERY 8 HOURS SCHEDULED
Status: DISCONTINUED | OUTPATIENT
Start: 2024-01-05 | End: 2024-01-12 | Stop reason: HOSPADM

## 2024-01-05 RX ORDER — QUETIAPINE FUMARATE 25 MG/1
12.5 TABLET, FILM COATED ORAL ONCE
Status: COMPLETED | OUTPATIENT
Start: 2024-01-05 | End: 2024-01-05

## 2024-01-05 RX ORDER — DIPHENHYDRAMINE HCL 25 MG
25 CAPSULE ORAL EVERY 6 HOURS PRN
Status: DISCONTINUED | OUTPATIENT
Start: 2024-01-05 | End: 2024-01-12 | Stop reason: HOSPADM

## 2024-01-05 RX ADMIN — METRONIDAZOLE 500 MG: 500 TABLET ORAL at 22:21

## 2024-01-05 RX ADMIN — PANTOPRAZOLE SODIUM 40 MG: 40 TABLET, DELAYED RELEASE ORAL at 06:31

## 2024-01-05 RX ADMIN — Medication 3 MG: at 21:29

## 2024-01-05 RX ADMIN — METRONIDAZOLE 500 MG: 500 TABLET ORAL at 16:43

## 2024-01-05 RX ADMIN — CEFEPIME 1 G: 1 INJECTION, POWDER, FOR SOLUTION INTRAMUSCULAR; INTRAVENOUS at 08:13

## 2024-01-05 RX ADMIN — DIPHENHYDRAMINE HYDROCHLORIDE 25 MG: 25 CAPSULE ORAL at 03:04

## 2024-01-05 RX ADMIN — QUETIAPINE FUMARATE 12.5 MG: 25 TABLET ORAL at 03:35

## 2024-01-05 RX ADMIN — ROSUVASTATIN 5 MG: 10 TABLET, FILM COATED ORAL at 21:29

## 2024-01-05 RX ADMIN — PREGABALIN 25 MG: 25 CAPSULE ORAL at 08:12

## 2024-01-05 RX ADMIN — METRONIDAZOLE 500 MG: 500 INJECTION, SOLUTION INTRAVENOUS at 08:12

## 2024-01-05 RX ADMIN — RIVAROXABAN 15 MG: 15 TABLET, FILM COATED ORAL at 16:40

## 2024-01-05 RX ADMIN — CEFEPIME 1 G: 1 INJECTION, POWDER, FOR SOLUTION INTRAMUSCULAR; INTRAVENOUS at 21:29

## 2024-01-05 RX ADMIN — SODIUM CHLORIDE 75 ML/HR: 9 INJECTION, SOLUTION INTRAVENOUS at 22:22

## 2024-01-05 RX ADMIN — METRONIDAZOLE 500 MG: 500 INJECTION, SOLUTION INTRAVENOUS at 01:33

## 2024-01-05 NOTE — PROGRESS NOTES
Aicha Lord is a 89 y.o. female     Has had more diarrhea  Discussed with infectious disease  Getting an echo because of Pseudomonas  Will need PICC line for IV antibiotics for going home    Review of Systems     Constitutional: no fever, no chills, not feeling poorly, not feeling tired   Cardiovascular: no chest pain   Respiratory: no cough, wheezing or shortness of breath a  Gastrointestinal:   Neurological: no headache,   All other systems have been reviewed and are negative for complaint.       Vitals:    01/05/24 0628   BP: 148/56   Pulse: 53   Resp: 20   Temp:    SpO2: 96%        Scheduled medications  cefepime, 1 g, intravenous, q12h  [Held by provider] furosemide, 40 mg, oral, Daily  melatonin, 3 mg, oral, Daily  metroNIDAZOLE, 500 mg, oral, q8h CALVIN  pantoprazole, 40 mg, oral, Daily before breakfast   Or  pantoprazole, 40 mg, intravenous, Daily before breakfast  polyethylene glycol, 17 g, oral, Daily  pregabalin, 25 mg, oral, Daily  psyllium, 1 packet, oral, Daily  rivaroxaban, 15 mg, oral, Daily with evening meal  rosuvastatin, 5 mg, oral, Nightly  [Held by provider] spironolactone, 25 mg, oral, Daily      Continuous medications  sodium chloride 0.9%, 75 mL/hr, Last Rate: 75 mL/hr (01/03/24 4681)      PRN medications  PRN medications: acetaminophen **OR** acetaminophen **OR** acetaminophen, albuterol, dextrose 10 % in water (D10W), dextrose, diphenhydrAMINE, glucagon, guaiFENesin, ondansetron **OR** ondansetron    Lab Review   Results from last 7 days   Lab Units 01/05/24  0626 01/04/24  0633 01/03/24  0803   WBC AUTO x10*3/uL 5.5 6.2 8.1   HEMOGLOBIN g/dL 10.7* 9.8* 9.5*   HEMATOCRIT % 34.8* 31.0* 30.2*   PLATELETS AUTO x10*3/uL 152 129* 124*       Results from last 7 days   Lab Units 01/05/24  0626 01/04/24  0633 01/03/24  0803 01/02/24  1234 01/01/24  2131   SODIUM mmol/L 132* 132* 129*   < > 132*   POTASSIUM mmol/L 3.7 3.8 3.9   < > 3.9   CHLORIDE mmol/L 106 105 100   < > 95*   CO2 mmol/L 16* 20*  20*   < > 25   BUN mg/dL 21 24* 31*   < > 24*   CREATININE mg/dL 1.05 1.13* 1.34*   < > 1.24*   CALCIUM mg/dL 8.8 9.0 8.8   < > 10.9*   PROTEIN TOTAL g/dL  --   --   --   --  7.0   BILIRUBIN TOTAL mg/dL  --   --   --   --  1.2   ALK PHOS U/L  --   --   --   --  70   ALT U/L  --   --   --   --  12   AST U/L  --   --   --   --  19   GLUCOSE mg/dL 90 84 84   < > 141*    < > = values in this interval not displayed.              Transthoracic Echo (TTE) Complete   Final Result      CT abdomen pelvis w IV contrast   Final Result   1.  Diffuse diverticulosis with focal thickening of the inferior wall   of the mid sigmoid: And associated small amount of ascitic fluid in   the pelvis consistent with sigmoid diverticulitis. This is similar in   location compared to 2016.  no evidence of free air or fluid   collection.   2. Moderate-sized hiatal hernia filled with fluid suggestive of   reflux esophagitis.   3. Osteopenia with age indeterminate compression deformity of the   inferior endplate of L1 new from 2016. Correlation with point   tenderness is recommended.   4. Cholelithiasis without cholecystitis. Right greater than left   renal atrophy similar to prior.             Signed by: Saleem Gill 1/1/2024 11:48 PM   Dictation workstation:   [id number]            Physical Exam     Constitutional   General appearance: Alert and in no acute distress.     Pulmonary   Respiratory assessment: No respiratory distress, normal respiratory rhythm and effort.    Auscultation of Lungs: Clear bilateral breath sounds.   Cardiovascular   Auscultation of heart: Apical pulse normal, heart rate and rhythm normal, normal S1 and S2, no murmurs and no pericardial rub.    Exam for edema: No peripheral edema.   Abdomen   Abdominal Exam: Left lower quadrant tenderness  Liver and Spleen exam: No hepato-splenomegaly.   Musculoskeletal   Examination of gait: ROM intact  Skin inspection: Normal skin color and pigmentation, normal skin turgor and no  visible rash.   Neurologic   Cranial nerves: Nerves 2-12 were intact, no focal neuro defects.     Assessment/Plan      #Acute recurrent diverticulitis  #Bacteremia  Continue IV antibiotics  Getting TTE  Will wants to the weekend     #Atrial fibrillation  Continue Xarelto     #Dyslipidemia  #Diabetes mellitus  Sliding-scale insulin coverage     #Hyponatremia  #Chronic kidney disease stage IIIb  Monitor while on fluids

## 2024-01-05 NOTE — PROGRESS NOTES
"  INFECTIOUS DISEASE DAILY PROGRESS NOTE    SUBJECTIVE:    No overnight events. Afebrile. Blood cx with additional organisms identified.    OBJECTIVE:  VITALS (Last 24 Hours)  /56   Pulse 53   Temp 36.8 °C (98.2 °F) (Temporal)   Resp 20   Ht 1.499 m (4' 11\")   Wt 61.7 kg (136 lb)   SpO2 96%   BMI 27.47 kg/m²     PHYSICAL EXAM:  Gen - NAD  Abd - soft, no distension, not ttp, BS present  Skin - no rash    ABX: IV Cefepime/Flagyl    LABS:  Lab Results   Component Value Date    WBC 5.5 01/05/2024    HGB 10.7 (L) 01/05/2024    HCT 34.8 (L) 01/05/2024    MCV 91 01/05/2024     01/05/2024     Lab Results   Component Value Date    GLUCOSE 90 01/05/2024    CALCIUM 8.8 01/05/2024     (L) 01/05/2024    K 3.7 01/05/2024    CO2 16 (L) 01/05/2024     01/05/2024    BUN 21 01/05/2024    CREATININE 1.05 01/05/2024           Estimated Creatinine Clearance: 25.4 mL/min (by C-G formula based on SCr of 1.05 mg/dL).    ASSESSMENT/PLAN:     Complicated Sigmoid Diverticulitis  Bacteremia due to E. Coli, Pseudomonas, GPC pairs/chains  Multiple Abx Allergies - penicillin, cipro, bactrim. Limits abx options.  CKD - CrCl 25, affects abx dosing     IV Cefepime and Flagyl. It seems we will likely need a course of IV abx since she is allergic to Cipro and there will not be a pill option then to treat Pseudomonas in the blood. I will check repeat blood cx given polymicrobial findings and not just E. Coli (particularly given the GPC pairs/chains). Checking TTE also.     Monitoring for adverse effects of abx such as rash/itching/diarrhea.     Will follow peripherally over the weekend. Please call Dr. Cruz with questions. Thanks! D/w Dr. Chris Alcala MD  [facility]  Office #[phone]    "

## 2024-01-05 NOTE — CARE PLAN
Problem: Pain - Adult  Goal: Verbalizes/displays adequate comfort level or baseline comfort level  Outcome: Progressing     Problem: Safety - Adult  Goal: Free from fall injury  Outcome: Progressing     Problem: Discharge Planning  Goal: Discharge to home or other facility with appropriate resources  Outcome: Progressing     Problem: Chronic Conditions and Co-morbidities  Goal: Patient's chronic conditions and co-morbidity symptoms are monitored and maintained or improved  Outcome: Progressing     Problem: Diabetes  Goal: Achieve decreasing blood glucose levels by end of shift  Outcome: Progressing     Problem: Pain  Goal: Takes deep breaths with improved pain control throughout the shift  Outcome: Progressing     Problem: Fall/Injury  Goal: Not fall by end of shift  Outcome: Progressing   The patient's goals for the shift include      The clinical goals for the shift include patient blake have no complaints of pain

## 2024-01-06 ENCOUNTER — APPOINTMENT (OUTPATIENT)
Dept: RADIOLOGY | Facility: HOSPITAL | Age: 89
DRG: 392 | End: 2024-01-06
Payer: MEDICARE

## 2024-01-06 LAB
ANION GAP SERPL CALC-SCNC: 14 MMOL/L (ref 10–20)
BUN SERPL-MCNC: 19 MG/DL (ref 6–23)
CALCIUM SERPL-MCNC: 8.6 MG/DL (ref 8.6–10.3)
CHLORIDE SERPL-SCNC: 111 MMOL/L (ref 98–107)
CO2 SERPL-SCNC: 16 MMOL/L (ref 21–32)
CREAT SERPL-MCNC: 1.1 MG/DL (ref 0.5–1.05)
ERYTHROCYTE [DISTWIDTH] IN BLOOD BY AUTOMATED COUNT: 16.2 % (ref 11.5–14.5)
GFR SERPL CREATININE-BSD FRML MDRD: 47 ML/MIN/1.73M*2
GLUCOSE BLD MANUAL STRIP-MCNC: 128 MG/DL (ref 74–99)
GLUCOSE BLD MANUAL STRIP-MCNC: 137 MG/DL (ref 74–99)
GLUCOSE BLD MANUAL STRIP-MCNC: 139 MG/DL (ref 74–99)
GLUCOSE BLD MANUAL STRIP-MCNC: 85 MG/DL (ref 74–99)
GLUCOSE SERPL-MCNC: 94 MG/DL (ref 74–99)
HCT VFR BLD AUTO: 31.7 % (ref 36–46)
HGB BLD-MCNC: 9.9 G/DL (ref 12–16)
MCH RBC QN AUTO: 26.8 PG (ref 26–34)
MCHC RBC AUTO-ENTMCNC: 31.2 G/DL (ref 32–36)
MCV RBC AUTO: 86 FL (ref 80–100)
NRBC BLD-RTO: 0 /100 WBCS (ref 0–0)
PLATELET # BLD AUTO: 171 X10*3/UL (ref 150–450)
POTASSIUM SERPL-SCNC: 3.6 MMOL/L (ref 3.5–5.3)
RBC # BLD AUTO: 3.7 X10*6/UL (ref 4–5.2)
SODIUM SERPL-SCNC: 137 MMOL/L (ref 136–145)
WBC # BLD AUTO: 4.6 X10*3/UL (ref 4.4–11.3)

## 2024-01-06 PROCEDURE — 71045 X-RAY EXAM CHEST 1 VIEW: CPT | Performed by: RADIOLOGY

## 2024-01-06 PROCEDURE — 36415 COLL VENOUS BLD VENIPUNCTURE: CPT | Performed by: NURSE PRACTITIONER

## 2024-01-06 PROCEDURE — 2500000001 HC RX 250 WO HCPCS SELF ADMINISTERED DRUGS (ALT 637 FOR MEDICARE OP): Performed by: NURSE PRACTITIONER

## 2024-01-06 PROCEDURE — 1100000001 HC PRIVATE ROOM DAILY

## 2024-01-06 PROCEDURE — 2500000001 HC RX 250 WO HCPCS SELF ADMINISTERED DRUGS (ALT 637 FOR MEDICARE OP): Performed by: INTERNAL MEDICINE

## 2024-01-06 PROCEDURE — 2500000004 HC RX 250 GENERAL PHARMACY W/ HCPCS (ALT 636 FOR OP/ED): Performed by: INTERNAL MEDICINE

## 2024-01-06 PROCEDURE — 80048 BASIC METABOLIC PNL TOTAL CA: CPT | Performed by: NURSE PRACTITIONER

## 2024-01-06 PROCEDURE — 71045 X-RAY EXAM CHEST 1 VIEW: CPT

## 2024-01-06 PROCEDURE — 2500000004 HC RX 250 GENERAL PHARMACY W/ HCPCS (ALT 636 FOR OP/ED): Performed by: NURSE PRACTITIONER

## 2024-01-06 PROCEDURE — 85027 COMPLETE CBC AUTOMATED: CPT | Performed by: NURSE PRACTITIONER

## 2024-01-06 PROCEDURE — 82947 ASSAY GLUCOSE BLOOD QUANT: CPT

## 2024-01-06 RX ADMIN — POLYETHYLENE GLYCOL 3350 17 G: 17 POWDER, FOR SOLUTION ORAL at 11:13

## 2024-01-06 RX ADMIN — PANTOPRAZOLE SODIUM 40 MG: 40 TABLET, DELAYED RELEASE ORAL at 06:33

## 2024-01-06 RX ADMIN — METRONIDAZOLE 500 MG: 500 TABLET ORAL at 13:12

## 2024-01-06 RX ADMIN — PREGABALIN 25 MG: 25 CAPSULE ORAL at 11:12

## 2024-01-06 RX ADMIN — RIVAROXABAN 15 MG: 15 TABLET, FILM COATED ORAL at 16:09

## 2024-01-06 RX ADMIN — ACETAMINOPHEN 650 MG: 325 TABLET ORAL at 21:50

## 2024-01-06 RX ADMIN — CEFEPIME 1 G: 1 INJECTION, POWDER, FOR SOLUTION INTRAMUSCULAR; INTRAVENOUS at 21:50

## 2024-01-06 RX ADMIN — Medication 3 MG: at 21:50

## 2024-01-06 RX ADMIN — CEFEPIME 1 G: 1 INJECTION, POWDER, FOR SOLUTION INTRAMUSCULAR; INTRAVENOUS at 11:10

## 2024-01-06 RX ADMIN — SODIUM CHLORIDE 75 ML/HR: 9 INJECTION, SOLUTION INTRAVENOUS at 11:20

## 2024-01-06 RX ADMIN — ROSUVASTATIN 5 MG: 10 TABLET, FILM COATED ORAL at 21:50

## 2024-01-06 RX ADMIN — PSYLLIUM HUSK 1 PACKET: 3.4 POWDER ORAL at 11:13

## 2024-01-06 RX ADMIN — METRONIDAZOLE 500 MG: 500 TABLET ORAL at 21:50

## 2024-01-06 RX ADMIN — ACETAMINOPHEN 650 MG: 325 TABLET ORAL at 02:39

## 2024-01-06 RX ADMIN — METRONIDAZOLE 500 MG: 500 TABLET ORAL at 06:33

## 2024-01-06 ASSESSMENT — COGNITIVE AND FUNCTIONAL STATUS - GENERAL
HELP NEEDED FOR BATHING: A LITTLE
DRESSING REGULAR UPPER BODY CLOTHING: A LITTLE
TOILETING: A LITTLE
MOBILITY SCORE: 21
CLIMB 3 TO 5 STEPS WITH RAILING: A LITTLE
WALKING IN HOSPITAL ROOM: A LITTLE
STANDING UP FROM CHAIR USING ARMS: A LITTLE
DRESSING REGULAR LOWER BODY CLOTHING: A LITTLE

## 2024-01-06 ASSESSMENT — PAIN - FUNCTIONAL ASSESSMENT: PAIN_FUNCTIONAL_ASSESSMENT: 0-10

## 2024-01-06 ASSESSMENT — PAIN SCALES - GENERAL: PAINLEVEL_OUTOF10: 3

## 2024-01-07 LAB
ANION GAP SERPL CALC-SCNC: 12 MMOL/L (ref 10–20)
BUN SERPL-MCNC: 15 MG/DL (ref 6–23)
CALCIUM SERPL-MCNC: 8.9 MG/DL (ref 8.6–10.3)
CHLORIDE SERPL-SCNC: 113 MMOL/L (ref 98–107)
CO2 SERPL-SCNC: 17 MMOL/L (ref 21–32)
CREAT SERPL-MCNC: 0.96 MG/DL (ref 0.5–1.05)
ERYTHROCYTE [DISTWIDTH] IN BLOOD BY AUTOMATED COUNT: 16.3 % (ref 11.5–14.5)
GFR SERPL CREATININE-BSD FRML MDRD: 55 ML/MIN/1.73M*2
GLUCOSE BLD MANUAL STRIP-MCNC: 101 MG/DL (ref 74–99)
GLUCOSE BLD MANUAL STRIP-MCNC: 145 MG/DL (ref 74–99)
GLUCOSE SERPL-MCNC: 90 MG/DL (ref 74–99)
HCT VFR BLD AUTO: 34 % (ref 36–46)
HGB BLD-MCNC: 10.5 G/DL (ref 12–16)
MCH RBC QN AUTO: 26.5 PG (ref 26–34)
MCHC RBC AUTO-ENTMCNC: 30.9 G/DL (ref 32–36)
MCV RBC AUTO: 86 FL (ref 80–100)
NRBC BLD-RTO: 0 /100 WBCS (ref 0–0)
PLATELET # BLD AUTO: 185 X10*3/UL (ref 150–450)
POTASSIUM SERPL-SCNC: 3.9 MMOL/L (ref 3.5–5.3)
Q ONSET: 229 MS
QRS COUNT: 14 BEATS
QRS DURATION: 66 MS
QT INTERVAL: 340 MS
QTC CALCULATION(BAZETT): 409 MS
QTC FREDERICIA: 384 MS
R AXIS: 38 DEGREES
RBC # BLD AUTO: 3.96 X10*6/UL (ref 4–5.2)
SODIUM SERPL-SCNC: 138 MMOL/L (ref 136–145)
T AXIS: 29 DEGREES
T OFFSET: 399 MS
VENTRICULAR RATE: 87 BPM
WBC # BLD AUTO: 4.5 X10*3/UL (ref 4.4–11.3)

## 2024-01-07 PROCEDURE — 2500000004 HC RX 250 GENERAL PHARMACY W/ HCPCS (ALT 636 FOR OP/ED): Performed by: FAMILY MEDICINE

## 2024-01-07 PROCEDURE — 1100000001 HC PRIVATE ROOM DAILY

## 2024-01-07 PROCEDURE — 2500000001 HC RX 250 WO HCPCS SELF ADMINISTERED DRUGS (ALT 637 FOR MEDICARE OP): Performed by: NURSE PRACTITIONER

## 2024-01-07 PROCEDURE — 2500000004 HC RX 250 GENERAL PHARMACY W/ HCPCS (ALT 636 FOR OP/ED): Performed by: NURSE PRACTITIONER

## 2024-01-07 PROCEDURE — 2500000004 HC RX 250 GENERAL PHARMACY W/ HCPCS (ALT 636 FOR OP/ED): Performed by: INTERNAL MEDICINE

## 2024-01-07 PROCEDURE — 82947 ASSAY GLUCOSE BLOOD QUANT: CPT

## 2024-01-07 PROCEDURE — 2500000001 HC RX 250 WO HCPCS SELF ADMINISTERED DRUGS (ALT 637 FOR MEDICARE OP): Performed by: INTERNAL MEDICINE

## 2024-01-07 PROCEDURE — 85027 COMPLETE CBC AUTOMATED: CPT | Performed by: NURSE PRACTITIONER

## 2024-01-07 PROCEDURE — 94760 N-INVAS EAR/PLS OXIMETRY 1: CPT

## 2024-01-07 PROCEDURE — 36415 COLL VENOUS BLD VENIPUNCTURE: CPT | Performed by: NURSE PRACTITIONER

## 2024-01-07 PROCEDURE — 80048 BASIC METABOLIC PNL TOTAL CA: CPT | Performed by: NURSE PRACTITIONER

## 2024-01-07 RX ORDER — FUROSEMIDE 10 MG/ML
20 INJECTION INTRAMUSCULAR; INTRAVENOUS ONCE
Status: COMPLETED | OUTPATIENT
Start: 2024-01-07 | End: 2024-01-07

## 2024-01-07 RX ADMIN — CEFEPIME 1 G: 1 INJECTION, POWDER, FOR SOLUTION INTRAMUSCULAR; INTRAVENOUS at 08:30

## 2024-01-07 RX ADMIN — PANTOPRAZOLE SODIUM 40 MG: 40 TABLET, DELAYED RELEASE ORAL at 06:47

## 2024-01-07 RX ADMIN — METRONIDAZOLE 500 MG: 500 TABLET ORAL at 07:10

## 2024-01-07 RX ADMIN — METRONIDAZOLE 500 MG: 500 TABLET ORAL at 22:40

## 2024-01-07 RX ADMIN — RIVAROXABAN 15 MG: 15 TABLET, FILM COATED ORAL at 17:00

## 2024-01-07 RX ADMIN — PREGABALIN 25 MG: 25 CAPSULE ORAL at 08:30

## 2024-01-07 RX ADMIN — SODIUM CHLORIDE 50 ML/HR: 9 INJECTION, SOLUTION INTRAVENOUS at 03:37

## 2024-01-07 RX ADMIN — ROSUVASTATIN 5 MG: 10 TABLET, FILM COATED ORAL at 22:40

## 2024-01-07 RX ADMIN — CEFEPIME 1 G: 1 INJECTION, POWDER, FOR SOLUTION INTRAMUSCULAR; INTRAVENOUS at 22:41

## 2024-01-07 RX ADMIN — METRONIDAZOLE 500 MG: 500 TABLET ORAL at 13:42

## 2024-01-07 RX ADMIN — FUROSEMIDE 20 MG: 10 INJECTION, SOLUTION INTRAMUSCULAR; INTRAVENOUS at 15:56

## 2024-01-07 ASSESSMENT — COGNITIVE AND FUNCTIONAL STATUS - GENERAL
MOVING FROM LYING ON BACK TO SITTING ON SIDE OF FLAT BED WITH BEDRAILS: A LITTLE
TURNING FROM BACK TO SIDE WHILE IN FLAT BAD: A LITTLE
CLIMB 3 TO 5 STEPS WITH RAILING: A LOT
DAILY ACTIVITIY SCORE: 20
DRESSING REGULAR UPPER BODY CLOTHING: A LITTLE
TOILETING: A LITTLE
TOILETING: A LITTLE
DRESSING REGULAR LOWER BODY CLOTHING: A LITTLE
HELP NEEDED FOR BATHING: A LITTLE
MOBILITY SCORE: 17
WALKING IN HOSPITAL ROOM: A LITTLE
WALKING IN HOSPITAL ROOM: A LITTLE
MOBILITY SCORE: 21
MOVING TO AND FROM BED TO CHAIR: A LITTLE
STANDING UP FROM CHAIR USING ARMS: A LITTLE
DRESSING REGULAR LOWER BODY CLOTHING: A LITTLE
PERSONAL GROOMING: A LITTLE
DRESSING REGULAR UPPER BODY CLOTHING: A LITTLE
STANDING UP FROM CHAIR USING ARMS: A LITTLE
CLIMB 3 TO 5 STEPS WITH RAILING: A LITTLE
DAILY ACTIVITIY SCORE: 19
HELP NEEDED FOR BATHING: A LITTLE

## 2024-01-07 ASSESSMENT — PAIN SCALES - PAIN ASSESSMENT IN ADVANCED DEMENTIA (PAINAD): BREATHING: NORMAL

## 2024-01-07 ASSESSMENT — PAIN SCALES - WONG BAKER: WONGBAKER_NUMERICALRESPONSE: NO HURT

## 2024-01-07 ASSESSMENT — PAIN SCALES - GENERAL
PAINLEVEL_OUTOF10: 0 - NO PAIN
PAINLEVEL_OUTOF10: 0 - NO PAIN

## 2024-01-07 ASSESSMENT — PAIN - FUNCTIONAL ASSESSMENT: PAIN_FUNCTIONAL_ASSESSMENT: 0-10

## 2024-01-07 NOTE — PROGRESS NOTES
Aicha Lord is a 89 y.o. female     Feels ok   Denies difficulty breathig   Per daughter at baseline she always appear to be having some difficulty breathign   Not on oxygen at home    Review of Systems     Constitutional: no fever, no chills, not feeling poorly, not feeling tired   Cardiovascular: no chest pain   Respiratory: no cough, wheezing or shortness of breath a  Gastrointestinal:   Neurological: no headache,   All other systems have been reviewed and are negative for complaint.       Vitals:    01/06/24   BP: 141/65   Pulse:    Resp:    Temp:    SpO2:         Scheduled medications  cefepime, 1 g, intravenous, q12h  [Held by provider] furosemide, 40 mg, oral, Daily  melatonin, 3 mg, oral, Daily  metroNIDAZOLE, 500 mg, oral, q8h CALVIN  pantoprazole, 40 mg, oral, Daily before breakfast   Or  pantoprazole, 40 mg, intravenous, Daily before breakfast  polyethylene glycol, 17 g, oral, Daily  pregabalin, 25 mg, oral, Daily  psyllium, 1 packet, oral, Daily  rivaroxaban, 15 mg, oral, Daily with evening meal  rosuvastatin, 5 mg, oral, Nightly  [Held by provider] spironolactone, 25 mg, oral, Daily      Continuous medications  sodium chloride 0.9%, 50 mL/hr, Last Rate: 50 mL/hr (01/07/24 0337)      PRN medications  PRN medications: acetaminophen **OR** acetaminophen **OR** acetaminophen, albuterol, dextrose 10 % in water (D10W), dextrose, diphenhydrAMINE, glucagon, guaiFENesin, ondansetron **OR** ondansetron    Lab Review   Results from last 7 days   Lab Units 01/06/24  0600 01/05/24  0626 01/04/24  0633   WBC AUTO x10*3/uL 4.6 5.5 6.2   HEMOGLOBIN g/dL 9.9* 10.7* 9.8*   HEMATOCRIT % 31.7* 34.8* 31.0*   PLATELETS AUTO x10*3/uL 171 152 129*       Results from last 7 days   Lab Units 01/06/24  0600 01/05/24  0626 01/04/24  0633 01/02/24  1234 01/01/24  2131   SODIUM mmol/L 137 132* 132*   < > 132*   POTASSIUM mmol/L 3.6 3.7 3.8   < > 3.9   CHLORIDE mmol/L 111* 106 105   < > 95*   CO2 mmol/L 16* 16* 20*   < > 25   BUN  mg/dL 19 21 24*   < > 24*   CREATININE mg/dL 1.10* 1.05 1.13*   < > 1.24*   CALCIUM mg/dL 8.6 8.8 9.0   < > 10.9*   PROTEIN TOTAL g/dL  --   --   --   --  7.0   BILIRUBIN TOTAL mg/dL  --   --   --   --  1.2   ALK PHOS U/L  --   --   --   --  70   ALT U/L  --   --   --   --  12   AST U/L  --   --   --   --  19   GLUCOSE mg/dL 94 90 84   < > 141*    < > = values in this interval not displayed.              Transthoracic Echo (TTE) Complete   Final Result      CT abdomen pelvis w IV contrast   Final Result   1.  Diffuse diverticulosis with focal thickening of the inferior wall   of the mid sigmoid: And associated small amount of ascitic fluid in   the pelvis consistent with sigmoid diverticulitis. This is similar in   location compared to 2016.  no evidence of free air or fluid   collection.   2. Moderate-sized hiatal hernia filled with fluid suggestive of   reflux esophagitis.   3. Osteopenia with age indeterminate compression deformity of the   inferior endplate of L1 new from 2016. Correlation with point   tenderness is recommended.   4. Cholelithiasis without cholecystitis. Right greater than left   renal atrophy similar to prior.             Signed by: Saleem Gill 1/1/2024 11:48 PM   Dictation workstation:   [id number]      XR chest 1 view    (Results Pending)         Physical Exam     Constitutional   General appearance: Alert and in no acute distress.     Pulmonary   Respiratory assessment: No respiratory distress, normal respiratory rhythm and effort.    Auscultation of Lungs: Clear bilateral breath sounds.  Diminished at bases  Cardiovascular   Auscultation of heart: Apical pulse normal, heart rate and rhythm normal, normal S1 and S2, no murmurs and no pericardial rub.    Exam for edema: No peripheral edema.   Abdomen   Abdominal Exam: Left lower quadrant tenderness  Liver and Spleen exam: No hepato-splenomegaly.   Musculoskeletal   Examination of gait: ROM intact  Skin inspection: Normal skin color and  pigmentation, normal skin turgor and no visible rash.   Neurologic   Cranial nerves: Nerves 2-12 were intact, no focal neuro defects.     Assessment/Plan      #Acute recurrent diverticulitis  #Bacteremia  Cultures form 1/5/24 pending  Continue IV antibiotics  Echo with no vegetation     #Atrial fibrillation  Continue Xarelto     #Dyslipidemia  #Diabetes mellitus  Sliding-scale insulin coverage     #Hyponatremia  #Chronic kidney disease stage IIIb  Monitor while on fluids    Decrease fluids,   Check cxr    Detailed dw pt and family by estela

## 2024-01-07 NOTE — CARE PLAN
The patient's goals for the shift include pt will remain safe throughout shift    The clinical goals for the shift include pt weill remian HDS this shift    Over the shift, the patient did not make progress toward the following goals. Barriers to progression include discomfort. Recommendations to address these barriers include increase comfort.

## 2024-01-08 ENCOUNTER — APPOINTMENT (OUTPATIENT)
Dept: CARDIOLOGY | Facility: HOSPITAL | Age: 89
DRG: 392 | End: 2024-01-08
Payer: MEDICARE

## 2024-01-08 ENCOUNTER — TELEPHONE (OUTPATIENT)
Dept: CARDIOLOGY | Facility: CLINIC | Age: 89
End: 2024-01-08
Payer: MEDICARE

## 2024-01-08 LAB
ANION GAP SERPL CALC-SCNC: 12 MMOL/L (ref 10–20)
BUN SERPL-MCNC: 14 MG/DL (ref 6–23)
CALCIUM SERPL-MCNC: 8.6 MG/DL (ref 8.6–10.3)
CHLORIDE SERPL-SCNC: 112 MMOL/L (ref 98–107)
CO2 SERPL-SCNC: 15 MMOL/L (ref 21–32)
CREAT SERPL-MCNC: 0.96 MG/DL (ref 0.5–1.05)
ERYTHROCYTE [DISTWIDTH] IN BLOOD BY AUTOMATED COUNT: 16.9 % (ref 11.5–14.5)
GFR SERPL CREATININE-BSD FRML MDRD: 55 ML/MIN/1.73M*2
GLUCOSE BLD MANUAL STRIP-MCNC: 128 MG/DL (ref 74–99)
GLUCOSE BLD MANUAL STRIP-MCNC: 178 MG/DL (ref 74–99)
GLUCOSE SERPL-MCNC: 120 MG/DL (ref 74–99)
HCT VFR BLD AUTO: 34.6 % (ref 36–46)
HGB BLD-MCNC: 10.5 G/DL (ref 12–16)
MCH RBC QN AUTO: 27.5 PG (ref 26–34)
MCHC RBC AUTO-ENTMCNC: 30.3 G/DL (ref 32–36)
MCV RBC AUTO: 91 FL (ref 80–100)
NRBC BLD-RTO: 0 /100 WBCS (ref 0–0)
PLATELET # BLD AUTO: 191 X10*3/UL (ref 150–450)
POTASSIUM SERPL-SCNC: 3.7 MMOL/L (ref 3.5–5.3)
RBC # BLD AUTO: 3.82 X10*6/UL (ref 4–5.2)
SODIUM SERPL-SCNC: 135 MMOL/L (ref 136–145)
WBC # BLD AUTO: 5.1 X10*3/UL (ref 4.4–11.3)

## 2024-01-08 PROCEDURE — 2500000004 HC RX 250 GENERAL PHARMACY W/ HCPCS (ALT 636 FOR OP/ED): Performed by: NURSE PRACTITIONER

## 2024-01-08 PROCEDURE — 2500000001 HC RX 250 WO HCPCS SELF ADMINISTERED DRUGS (ALT 637 FOR MEDICARE OP): Performed by: NURSE PRACTITIONER

## 2024-01-08 PROCEDURE — 94760 N-INVAS EAR/PLS OXIMETRY 1: CPT

## 2024-01-08 PROCEDURE — 77001 FLUOROGUIDE FOR VEIN DEVICE: CPT

## 2024-01-08 PROCEDURE — 2500000005 HC RX 250 GENERAL PHARMACY W/O HCPCS: Performed by: RADIOLOGY

## 2024-01-08 PROCEDURE — 99232 SBSQ HOSP IP/OBS MODERATE 35: CPT | Performed by: INTERNAL MEDICINE

## 2024-01-08 PROCEDURE — 97161 PT EVAL LOW COMPLEX 20 MIN: CPT | Mod: GP

## 2024-01-08 PROCEDURE — 36558 INSERT TUNNELED CV CATH: CPT

## 2024-01-08 PROCEDURE — 36415 COLL VENOUS BLD VENIPUNCTURE: CPT | Performed by: NURSE PRACTITIONER

## 2024-01-08 PROCEDURE — 1100000001 HC PRIVATE ROOM DAILY

## 2024-01-08 PROCEDURE — 97165 OT EVAL LOW COMPLEX 30 MIN: CPT | Mod: GO | Performed by: OCCUPATIONAL THERAPIST

## 2024-01-08 PROCEDURE — 82947 ASSAY GLUCOSE BLOOD QUANT: CPT

## 2024-01-08 PROCEDURE — 36558 INSERT TUNNELED CV CATH: CPT | Mod: RT | Performed by: RADIOLOGY

## 2024-01-08 PROCEDURE — RXMED WILLOW AMBULATORY MEDICATION CHARGE

## 2024-01-08 PROCEDURE — 76937 US GUIDE VASCULAR ACCESS: CPT | Performed by: RADIOLOGY

## 2024-01-08 PROCEDURE — 2500000001 HC RX 250 WO HCPCS SELF ADMINISTERED DRUGS (ALT 637 FOR MEDICARE OP): Performed by: INTERNAL MEDICINE

## 2024-01-08 PROCEDURE — C1751 CATH, INF, PER/CENT/MIDLINE: HCPCS

## 2024-01-08 PROCEDURE — 02HV33Z INSERTION OF INFUSION DEVICE INTO SUPERIOR VENA CAVA, PERCUTANEOUS APPROACH: ICD-10-PCS | Performed by: RADIOLOGY

## 2024-01-08 PROCEDURE — 85027 COMPLETE CBC AUTOMATED: CPT | Performed by: NURSE PRACTITIONER

## 2024-01-08 PROCEDURE — 99221 1ST HOSP IP/OBS SF/LOW 40: CPT

## 2024-01-08 PROCEDURE — 80048 BASIC METABOLIC PNL TOTAL CA: CPT | Performed by: NURSE PRACTITIONER

## 2024-01-08 PROCEDURE — 77001 FLUOROGUIDE FOR VEIN DEVICE: CPT | Performed by: RADIOLOGY

## 2024-01-08 PROCEDURE — 2780000003 HC OR 278 NO HCPCS

## 2024-01-08 PROCEDURE — 76937 US GUIDE VASCULAR ACCESS: CPT

## 2024-01-08 PROCEDURE — 2500000004 HC RX 250 GENERAL PHARMACY W/ HCPCS (ALT 636 FOR OP/ED): Performed by: INTERNAL MEDICINE

## 2024-01-08 RX ORDER — METRONIDAZOLE 500 MG/1
500 TABLET ORAL EVERY 8 HOURS SCHEDULED
Qty: 30 TABLET | Refills: 0 | Status: SHIPPED | OUTPATIENT
Start: 2024-01-08 | End: 2024-01-29 | Stop reason: HOSPADM

## 2024-01-08 RX ORDER — LIDOCAINE HYDROCHLORIDE AND EPINEPHRINE 10; 10 MG/ML; UG/ML
INJECTION, SOLUTION INFILTRATION; PERINEURAL AS NEEDED
Status: DISCONTINUED | OUTPATIENT
Start: 2024-01-08 | End: 2024-01-12 | Stop reason: HOSPADM

## 2024-01-08 RX ADMIN — ACETAMINOPHEN 650 MG: 325 TABLET ORAL at 04:37

## 2024-01-08 RX ADMIN — RIVAROXABAN 15 MG: 15 TABLET, FILM COATED ORAL at 18:36

## 2024-01-08 RX ADMIN — PREGABALIN 25 MG: 25 CAPSULE ORAL at 09:09

## 2024-01-08 RX ADMIN — PSYLLIUM HUSK 1 PACKET: 3.4 POWDER ORAL at 09:09

## 2024-01-08 RX ADMIN — LIDOCAINE HYDROCHLORIDE AND EPINEPHRINE 5 ML: 10; 10 INJECTION, SOLUTION INFILTRATION; PERINEURAL at 16:47

## 2024-01-08 RX ADMIN — METRONIDAZOLE 500 MG: 500 TABLET ORAL at 22:00

## 2024-01-08 RX ADMIN — METRONIDAZOLE 500 MG: 500 TABLET ORAL at 06:13

## 2024-01-08 RX ADMIN — CEFEPIME 1 G: 1 INJECTION, POWDER, FOR SOLUTION INTRAMUSCULAR; INTRAVENOUS at 09:14

## 2024-01-08 RX ADMIN — CEFEPIME 1 G: 1 INJECTION, POWDER, FOR SOLUTION INTRAMUSCULAR; INTRAVENOUS at 20:57

## 2024-01-08 RX ADMIN — PANTOPRAZOLE SODIUM 40 MG: 40 TABLET, DELAYED RELEASE ORAL at 06:13

## 2024-01-08 RX ADMIN — POLYETHYLENE GLYCOL 3350 17 G: 17 POWDER, FOR SOLUTION ORAL at 09:09

## 2024-01-08 RX ADMIN — Medication 3 MG: at 18:36

## 2024-01-08 RX ADMIN — ROSUVASTATIN 5 MG: 10 TABLET, FILM COATED ORAL at 20:55

## 2024-01-08 ASSESSMENT — COGNITIVE AND FUNCTIONAL STATUS - GENERAL
PERSONAL GROOMING: A LITTLE
CLIMB 3 TO 5 STEPS WITH RAILING: A LOT
STANDING UP FROM CHAIR USING ARMS: A LOT
DAILY ACTIVITIY SCORE: 19
HELP NEEDED FOR BATHING: A LOT
MOVING TO AND FROM BED TO CHAIR: A LITTLE
MOVING TO AND FROM BED TO CHAIR: A LOT
WALKING IN HOSPITAL ROOM: A LITTLE
CLIMB 3 TO 5 STEPS WITH RAILING: TOTAL
MOBILITY SCORE: 17
TURNING FROM BACK TO SIDE WHILE IN FLAT BAD: A LITTLE
STANDING UP FROM CHAIR USING ARMS: A LITTLE
MOVING FROM LYING ON BACK TO SITTING ON SIDE OF FLAT BED WITH BEDRAILS: A LOT
DRESSING REGULAR LOWER BODY CLOTHING: A LOT
TOILETING: A LITTLE
CLIMB 3 TO 5 STEPS WITH RAILING: TOTAL
PERSONAL GROOMING: A LITTLE
HELP NEEDED FOR BATHING: A LITTLE
TOILETING: A LITTLE
DRESSING REGULAR UPPER BODY CLOTHING: A LITTLE
STANDING UP FROM CHAIR USING ARMS: A LOT
TURNING FROM BACK TO SIDE WHILE IN FLAT BAD: A LITTLE
DRESSING REGULAR UPPER BODY CLOTHING: A LITTLE
WALKING IN HOSPITAL ROOM: A LOT
DRESSING REGULAR LOWER BODY CLOTHING: A LOT
TOILETING: A LITTLE
MOBILITY SCORE: 16
DRESSING REGULAR UPPER BODY CLOTHING: A LITTLE
TURNING FROM BACK TO SIDE WHILE IN FLAT BAD: A LOT
WALKING IN HOSPITAL ROOM: A LITTLE
EATING MEALS: A LITTLE
DRESSING REGULAR LOWER BODY CLOTHING: A LITTLE
HELP NEEDED FOR BATHING: A LITTLE
MOVING FROM LYING ON BACK TO SITTING ON SIDE OF FLAT BED WITH BEDRAILS: A LITTLE
MOBILITY SCORE: 11
DAILY ACTIVITIY SCORE: 16
MOVING TO AND FROM BED TO CHAIR: A LITTLE
DAILY ACTIVITIY SCORE: 19

## 2024-01-08 ASSESSMENT — PAIN SCALES - GENERAL
PAINLEVEL_OUTOF10: 0 - NO PAIN
PAINLEVEL_OUTOF10: 4

## 2024-01-08 ASSESSMENT — ACTIVITIES OF DAILY LIVING (ADL)
ADL_ASSISTANCE: INDEPENDENT
ADL_ASSISTANCE: INDEPENDENT

## 2024-01-08 ASSESSMENT — PAIN - FUNCTIONAL ASSESSMENT
PAIN_FUNCTIONAL_ASSESSMENT: 0-10

## 2024-01-08 ASSESSMENT — PAIN SCALES - WONG BAKER: WONGBAKER_NUMERICALRESPONSE: NO HURT

## 2024-01-08 NOTE — PRE-PROCEDURE NOTE
Interventional Radiology Preprocedure Note    Indication for procedure: The primary encounter diagnosis was Diverticulitis. Diagnoses of Sepsis, due to unspecified organism, unspecified whether acute organ dysfunction present (CMS/HCC), Atrial fibrillation, unspecified type (CMS/[facility]), and Bacteremia were also pertinent to this visit.    Relevant review of systems: Constitutional:  Negative for fever or chills. Respiratory:  Negative for shortness of breath. Cardiovascular:  Negative for chest pain or palpitations. Gastrointestinal:  Negative for abdominal distention, abdominal pain, blood in stool, constipation, diarrhea, nausea and vomiting. Neurological: Negative for confusion.       Relevant Labs:   Lab Results   Component Value Date    CREATININE 0.96 01/08/2024    EGFR 55 (L) 01/08/2024    INR 1.6 (H) 02/17/2019    PROTIME 17.4 (H) 02/17/2019       Planned Sedation/Anesthesia: Minimal    Airway assessment: normal    Directed physical examination:    Physical Exam  Constitutional:       General: She is not in acute distress.  Cardiovascular:      Rate and Rhythm: Normal rate and regular rhythm.   Pulmonary:      Effort: Pulmonary effort is normal. No respiratory distress.      Breath sounds: WNL  Abdominal:      General: Abdomen is flat. Bowel sounds are normal. There is no distension.      Palpations: Abdomen is soft, nontender, BS+  Neurological:      Mental Status: She is alert and oriented      Mallampati: I (soft palate, uvula, fauces, and tonsillar pillars visible)    ASA Score: ASA 3 - Patient with moderate systemic disease with functional limitations    Benefits, risks and alternatives of procedure and planned sedation have been discussed with the patient and/or their representative. All questions answered and they agree to proceed.

## 2024-01-08 NOTE — CARE PLAN
The patient's goals for the shift include      The clinical goals for the shift include Patient will experience respiratory relief by deep breathing and coughing. Patient will remain safe and free from falls this shift.    Over the shift, the patient did make progress toward the following goals. No barriers to progression noted this shift.

## 2024-01-08 NOTE — CONSULTS
Consults    Reason For Consult  Tunneled PICC line placement    History Of Present Illness  Aicha Lord is a 89 y.o. female presenting with a past medical history of Afib on Xeralto, TIIDM, and CKD. [place] [facility] [place]. Found to have diverticulitis and bacteremia had positive BC X2 for Ecoli and Pseudomonas. Was started on Abx therapy. Is nearing discharge and needs a tunneled PICC line for long term antimicrobial therapy.      Past Medical History  She has a past medical history of Abdominal pain of multiple sites (04/11/2023), Age-related nuclear cataract, left (04/11/2023), Age-related nuclear cataract, right (04/11/2023), Anemia (04/11/2023), Appetite loss (04/11/2023), Arrhythmia (04/11/2023), Change in bowel movement (04/11/2023), Cramp and spasm (10/05/2016), Dizziness and giddiness (02/25/2019), Edema (04/11/2023), Essential (primary) hypertension (12/08/2022), Hyperglycemia (04/11/2023), Hyperlipidemia, unspecified (12/08/2022), Hypermetropia, bilateral (01/24/2018), Iron deficiency anemia, unspecified (02/28/2019), Long term (current) use of antibiotics (05/06/2014), Longstanding persistent atrial fibrillation (CMS/HCC) (10/23/2023), Macular degeneration, 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), Pain in unspecified hand (08/07/2014), Pain of left hand (04/11/2023), Palpitations (06/08/2023), Personal history of other diseases of the circulatory system, Personal history of other diseases of the digestive system (07/25/2016), Personal history of other diseases of the musculoskeletal system and connective tissue (07/01/2014), Personal history of other diseases of the respiratory system (04/01/2014), Personal history of other drug therapy (10/29/2018), Personal history of other endocrine, nutritional and metabolic disease, Personal history of other endocrine, nutritional and metabolic disease (10/02/2015), Personal history of other endocrine,  nutritional and metabolic disease (01/31/2014), Personal history of other specified conditions (02/05/2019), Personal history of other specified conditions (02/28/2019), Personal history of pulmonary embolism (12/27/2019), Pneumonia, unspecified organism (01/30/2014), Prediabetes (04/30/2018), Subluxation of left index finger (04/11/2023), Type 2 diabetes mellitus without complications (CMS/HCC) (02/25/2019), Unspecified atrial fibrillation (CMS/[facility]) (12/08/2022), Unspecified cataract, and Unspecified epiphora, unspecified side (07/25/2018).    Surgical History  She has a past surgical history that includes Other surgical history (10/29/2013); Cataract extraction (04/17/2018); Breast lumpectomy (06/13/2017); Foot surgery (01/30/2014); Total knee arthroplasty (01/30/2014); Mastectomy (01/30/2014); and US guided thyroid biopsy (8/21/2014).     Social History  She reports that she has never smoked. She has never used smokeless tobacco. She reports that she does not currently use alcohol. She reports that she does not currently use drugs.    Family History  Family History   Problem Relation Name Age of Onset    Other (cardiac disorder) Father      Hypertension Father      Hodgkin's lymphoma Brother      Hypertension Paternal Grandmother      Hypertension Paternal Grandfather      Hodgkin's lymphoma Other family history         Allergies  Celecoxib, Ciprofloxacin, Penicillins, Promethazine, Statins-hmg-coa reductase inhibitors, Sulfamethoxazole-trimethoprim, and Tramadol    MEDS:    Current Facility-Administered Medications:     acetaminophen (Tylenol) tablet 650 mg, 650 mg, oral, q4h PRN, 650 mg at 01/08/24 0437 **OR** acetaminophen (Tylenol) oral liquid 650 mg, 650 mg, oral, q4h PRN **OR** acetaminophen (Tylenol) suppository 650 mg, 650 mg, rectal, q4h PRN, Marian Perez MD    albuterol 2.5 mg /3 mL (0.083 %) nebulizer solution 2.5 mg, 2.5 mg, nebulization, q4h PRN, Redd Urias, PharmD    cefepime (Maxipime) 1 g in  dextrose 5 % 50 mL IV, 1 g, intravenous, q12h, Carey FABBY Angle APRN-CNP, Stopped at 01/08/24 0944    dextrose 10 % in water (D10W) infusion, 0.3 g/kg/hr, intravenous, Once PRN, Carey FABBY Angle APRN-CNP    dextrose 50 % injection 25 g, 25 g, intravenous, q15 min PRN, Carey FABBY Angle APRN-CNP    diphenhydrAMINE (BENADryl) capsule 25 mg, 25 mg, oral, q6h PRN, Debbie Hathaway PA-C, 25 mg at 01/05/24 0304    [Held by provider] furosemide (Lasix) tablet 40 mg, 40 mg, oral, Daily, Carey Barbour APRN-CNP    glucagon (Glucagen) injection 1 mg, 1 mg, intramuscular, q15 min PRN, Carey FABBY Angle APRN-CNP    guaiFENesin (Mucinex) 12 hr tablet 600 mg, 600 mg, oral, q12h PRN, Marian Perez MD    melatonin tablet 3 mg, 3 mg, oral, Daily, Carey Barbour APRN-CNP, 3 mg at 01/06/24 2150    metroNIDAZOLE (Flagyl) tablet 500 mg, 500 mg, oral, q8h CALVIN, Maninder Aclala MD, 500 mg at 01/08/24 0613    ondansetron (Zofran) tablet 4 mg, 4 mg, oral, q8h PRN **OR** ondansetron (Zofran) injection 4 mg, 4 mg, intravenous, q8h PRN, Carey Barbour APRN-CNP    pantoprazole (ProtoNix) EC tablet 40 mg, 40 mg, oral, Daily before breakfast, 40 mg at 01/08/24 0613 **OR** pantoprazole (ProtoNix) injection 40 mg, 40 mg, intravenous, Daily before breakfast, Marian Perez MD, 40 mg at 01/03/24 0600    polyethylene glycol (Glycolax, Miralax) packet 17 g, 17 g, oral, Daily, Carey Barboru APRN-CNP, 17 g at 01/08/24 0909    pregabalin (Lyrica) capsule 25 mg, 25 mg, oral, Daily, Angelica Mason APRN-CNP, 25 mg at 01/08/24 0909    psyllium (Metamucil) 3.4 gram packet 1 packet, 1 packet, oral, Daily, Marian Perez MD, 1 packet at 01/08/24 0909    rivaroxaban (Xarelto) tablet 15 mg, 15 mg, oral, Daily with evening meal, Marian Perez MD, 15 mg at 01/07/24 1700    rosuvastatin (Crestor) tablet 5 mg, 5 mg, oral, Nightly, Marian Perez MD, 5 mg at 01/07/24 2240    [Held by provider] spironolactone (Aldactone) tablet 25 mg, 25 mg, oral, Daily, Carey Barbour,  "APRN-CNP    Review of Systems  General ROS: negative  Respiratory ROS: no cough, shortness of breath, or wheezing  Cardiovascular ROS: no chest pain or dyspnea on exertion  Gastrointestinal ROS: no abdominal pain, change in bowel habits, or black or bloody stools  Musculoskeletal ROS: negative     Last Recorded Vitals  /72 (BP Location: Left arm, Patient Position: Lying)   Pulse 50   Temp 36.3 °C (97.3 °F) (Temporal)   Resp 18   Wt 61.7 kg (136 lb)   SpO2 98%      Physical Exam  Orientation: oriented to person, place, time, and general circumstances  HEENT: normocephalic, atraumatic  Pulm: clear to auscultation bilaterally, no wheezes, good air entry  Cardiac: Regular rate and rhythm or without murmur or extra heart sounds  GI: soft, nontender, normal bowel sounds  Pulses: peripheral pulses symmetrical    Relevant Results    LABS:  Lab Results   Component Value Date    WBC 5.1 01/08/2024    HGB 10.5 (L) 01/08/2024    HCT 34.6 (L) 01/08/2024    MCV 91 01/08/2024     01/08/2024      Results from last 72 hours   Lab Units 01/08/24  0633   SODIUM mmol/L 135*   POTASSIUM mmol/L 3.7   CHLORIDE mmol/L 112*   CO2 mmol/L 15*   BUN mg/dL 14   CREATININE mg/dL 0.96   GLUCOSE mg/dL 120*   CALCIUM mg/dL 8.6   ANION GAP mmol/L 12   EGFR mL/min/1.73m*2 55*               No lab exists for component: \"PT\"    MICRO:  Susceptibility data from last 14 days.  Collected Specimen Info Organism Ampicillin Aztreonam Cefazolin Cefepime Ceftazidime Ceftriaxone Cefuroxime Ciprofloxacin Gentamicin Imipenem Levofloxacin Piperacillin/Tazobactam Tobramycin Trimethoprim/Sulfamethoxazole   01/02/24 Blood culture from Peripheral Venipuncture Escherichia coli S  I   S S S S  S S  S     Pseudomonas aeruginosa  S  S S   S S R S S S    01/01/24 Blood culture from Peripheral Venipuncture Escherichia coli                   Pseudomonas aeruginosa                     IMAGING:   XR chest 1 view   Final Result   Enlarged cardiac silhouette. " Question of faint parenchymal   infiltration.        MACRO:   none        Signed by: Rasheeda Healy 1/7/2024 10:09 AM   Dictation workstation:   [id number]      Transthoracic Echo (TTE) Complete   Final Result      CT abdomen pelvis w IV contrast   Final Result   1.  Diffuse diverticulosis with focal thickening of the inferior wall   of the mid sigmoid: And associated small amount of ascitic fluid in   the pelvis consistent with sigmoid diverticulitis. This is similar in   location compared to 2016.  no evidence of free air or fluid   collection.   2. Moderate-sized hiatal hernia filled with fluid suggestive of   reflux esophagitis.   3. Osteopenia with age indeterminate compression deformity of the   inferior endplate of L1 new from 2016. Correlation with point   tenderness is recommended.   4. Cholelithiasis without cholecystitis. Right greater than left   renal atrophy similar to prior.             Signed by: Saleem Gill 1/1/2024 11:48 PM   Dictation workstation:   [id number]      IR CVC tunneled    (Results Pending)            Assessment/Plan     This is a 89 y.o. female presenting with a past medical history of Afib on Xeralto, TIIDM, and CKD. [place] [facility] [place]. Found to have diverticulitis and bacteremia had positive BC X2 for Ecoli and Pseudomonas. Was started on Abx therapy. Is nearing discharge and needs a tunneled PICC line for long term antimicrobial therapy.     New set of blood cultures were performed 1/5/2024 and are negative to date. Denies any residual abdominal pain. Patient is amenable to a tunneled PICC line placement. Hope to perform today pending IR schedule. If not today will add on for tomorrow.     Bright [place], APRN-CNP    Time : Billing Time  Prep time on date of the patient encounter: 15 minutes.   Time spent directly with patient/family/caregiver: 10 minutes.   Documentation time: 15 minutes.   Total time (minutes):  40 minutes  Time Spent with this  Patient (minutes).  More than 50% of This Time was Spent in Counseling and/or Coordination of Care\

## 2024-01-08 NOTE — PROGRESS NOTES
1/8/2024 12:21 PM I spoke with patient's son. Patient lives with son. Son agrees to SNF if PT recommends. I have emailed him a SNF list and asked him to call back with three SNF choices. Génesis BUTTS

## 2024-01-08 NOTE — PROGRESS NOTES
Aicha Lord is a 89 y.o. female     S/p PICC line    Review of Systems     Constitutional: no fever, no chills, not feeling poorly, not feeling tired   Cardiovascular: no chest pain   Respiratory: no cough, wheezing or shortness of breath a  Gastrointestinal:   Neurological: no headache,   All other systems have been reviewed and are negative for complaint.       Vitals:    01/08/24 1756   BP: 158/66   Pulse: 58   Resp: 16   Temp:    SpO2: 97%        Scheduled medications  cefepime, 1 g, intravenous, q12h  [Held by provider] furosemide, 40 mg, oral, Daily  melatonin, 3 mg, oral, Daily  metroNIDAZOLE, 500 mg, oral, q8h CALVIN  pantoprazole, 40 mg, oral, Daily before breakfast   Or  pantoprazole, 40 mg, intravenous, Daily before breakfast  polyethylene glycol, 17 g, oral, Daily  pregabalin, 25 mg, oral, Daily  psyllium, 1 packet, oral, Daily  rivaroxaban, 15 mg, oral, Daily with evening meal  rosuvastatin, 5 mg, oral, Nightly  [Held by provider] spironolactone, 25 mg, oral, Daily      Continuous medications       PRN medications  PRN medications: acetaminophen **OR** acetaminophen **OR** acetaminophen, albuterol, dextrose 10 % in water (D10W), dextrose, diphenhydrAMINE, glucagon, guaiFENesin, lidocaine-epinephrine, ondansetron **OR** ondansetron    Lab Review   Results from last 7 days   Lab Units 01/08/24  0633 01/07/24  0626 01/06/24  0600   WBC AUTO x10*3/uL 5.1 4.5 4.6   HEMOGLOBIN g/dL 10.5* 10.5* 9.9*   HEMATOCRIT % 34.6* 34.0* 31.7*   PLATELETS AUTO x10*3/uL 191 185 171       Results from last 7 days   Lab Units 01/08/24  0633 01/07/24  0626 01/06/24  0600 01/02/24  1234 01/01/24  2131   SODIUM mmol/L 135* 138 137   < > 132*   POTASSIUM mmol/L 3.7 3.9 3.6   < > 3.9   CHLORIDE mmol/L 112* 113* 111*   < > 95*   CO2 mmol/L 15* 17* 16*   < > 25   BUN mg/dL 14 15 19   < > 24*   CREATININE mg/dL 0.96 0.96 1.10*   < > 1.24*   CALCIUM mg/dL 8.6 8.9 8.6   < > 10.9*   PROTEIN TOTAL g/dL  --   --   --   --  7.0    BILIRUBIN TOTAL mg/dL  --   --   --   --  1.2   ALK PHOS U/L  --   --   --   --  70   ALT U/L  --   --   --   --  12   AST U/L  --   --   --   --  19   GLUCOSE mg/dL 120* 90 94   < > 141*    < > = values in this interval not displayed.              XR chest 1 view   Final Result   Enlarged cardiac silhouette. Question of faint parenchymal   infiltration.        MACRO:   none        Signed by: Rasheeda Healy 1/7/2024 10:09 AM   Dictation workstation:   [id number]      Transthoracic Echo (TTE) Complete   Final Result      CT abdomen pelvis w IV contrast   Final Result   1.  Diffuse diverticulosis with focal thickening of the inferior wall   of the mid sigmoid: And associated small amount of ascitic fluid in   the pelvis consistent with sigmoid diverticulitis. This is similar in   location compared to 2016.  no evidence of free air or fluid   collection.   2. Moderate-sized hiatal hernia filled with fluid suggestive of   reflux esophagitis.   3. Osteopenia with age indeterminate compression deformity of the   inferior endplate of L1 new from 2016. Correlation with point   tenderness is recommended.   4. Cholelithiasis without cholecystitis. Right greater than left   renal atrophy similar to prior.             Signed by: Saleem Gill 1/1/2024 11:48 PM   Dictation workstation:   [id number]      IR CVC tunneled    (Results Pending)         Physical Exam     Constitutional   General appearance: Alert and in no acute distress.     Pulmonary   Respiratory assessment: No respiratory distress, normal respiratory rhythm and effort.    Auscultation of Lungs: Clear bilateral breath sounds.   Cardiovascular   Auscultation of heart: Apical pulse normal, heart rate and rhythm normal, normal S1 and S2, no murmurs and no pericardial rub.    Exam for edema: No peripheral edema.   Abdomen   Abdominal Exam: Left lower quadrant tenderness  Liver and Spleen exam: No hepato-splenomegaly.   Musculoskeletal   Examination of gait: ROM  intact  Skin inspection: Normal skin color and pigmentation, normal skin turgor and no visible rash.   Neurologic   Cranial nerves: Nerves 2-12 were intact, no focal neuro defects.     Assessment/Plan      #Acute recurrent diverticulitis  #Bacteremia  Status post PICC line  TTE is okay  IV cefepime 1 g every 12 through 1/8/2024  P.o. Flagyl 3 times daily through 1/18/2024     #Atrial fibrillation  Continue Xarelto     #Dyslipidemia  #Diabetes mellitus  Sliding-scale insulin coverage    #Deconditioning  PT OT consulted     #Hyponatremia  #Chronic kidney disease stage IIIb  Monitor while on fluids

## 2024-01-08 NOTE — TELEPHONE ENCOUNTER
Pt's granddaughter is very frustrated with inpatient team as she does not believe they are listening to their concerns. Advised for her to ask for nurse manager or for care coordinator that can help facilitate her questions to the current team, get a new team, or possibly get a cardiology consult. Reviewed with Dr. King and he agrees with this plain.

## 2024-01-08 NOTE — POST-PROCEDURE NOTE
Interventional Radiology Brief Postprocedure Note    Attending: Isa    Assistant: None    Diagnosis: infection requiring iv abx    Description of procedure: RIJ tunneled CVC, ready for use.     Anesthesia:  Local    Complications: None    Estimated Blood Loss: minimal          See detailed result report with images in PACS.    The patient tolerated the procedure well without incident or complication and is in stable condition.

## 2024-01-08 NOTE — PROGRESS NOTES
Physical Therapy    Physical Therapy Evaluation    Patient Name: Aicha Lord  MRN: [id number]  Today's Date: 1/8/2024   Time Calculation  Start Time: 1222  Stop Time: 1242  Time Calculation (min): 20 min    Assessment/Plan   PT Assessment  PT Assessment Results: Decreased strength, Decreased endurance, Impaired balance, Decreased mobility  Rehab Prognosis: Good  Barriers to Discharge: Pt only used SPC at baseline - now requires FWW; requiring 3 L/min O2 (none used at baseline)  Medical Staff Made Aware: Yes  Strengths: Premorbid level of function  End of Session Communication: Bedside nurse  Assessment Comment: Pt demonstrating deficits in generalized strength, endurance and balance as well as increased pain resulting in impaired functional mobility, gait and self care. Due to the impairments listed above, pt would benefit from skilled physical therapy services in acute care setting as well as additional therapy recommendation at DC listed below  End of Session Patient Position: Up in chair, Alarm on  IP OR SWING BED PT PLAN  Inpatient or Swing Bed: Inpatient  PT Plan  Treatment/Interventions: Bed mobility, Transfer training, Gait training, Stair training, Balance training, Neuromuscular re-education, Strengthening, Endurance training, Therapeutic exercise, Therapeutic activity, Home exercise program  PT Plan: Skilled PT  PT Frequency: 3 times per week  PT Discharge Recommendations: Moderate intensity level of continued care  PT - OK to Discharge: Yes (PT POC initiated this date)      Subjective   General Visit Information:  General  Reason for Referral: Pt is a 89 yo female presenting [facility] with LLQ pain - pt with recurrent diverticulitis  Referred By: JULIUS Corey-CNP  Past Medical History Relevant to Rehab: atrial fibrillation on Xarelto hypertension diabetes mellitus type 2 dyslipidemia stage III kidney disease history of primary hyperparathyroidism osteoporosis chronic pain syndrome  Co-Treatment:  OT  Co-Treatment Reason: for maximal pt participation and safety  Prior to Session Communication: Bedside nurse  Patient Position Received: Bed, 3 rail up, Alarm on  Home Living:  Home Living  Type of Home: House  Lives With: Adult children (son and daughter-in-law with their foster children)  Home Adaptive Equipment: Walker rolling or standard, Cane  Home Layout: One level (in-law suite off family's house.)  Home Access: Stairs to enter without rails  Entrance Stairs-Rails: None  Entrance Stairs-Number of Steps: 1  Bathroom Shower/Tub: Walk-in shower  Bathroom Toilet: Handicapped height  Bathroom Equipment: Grab bars in shower, Shower chair with back  Prior Level of Function:  Prior Function Per Pt/Caregiver Report  Level of [place]: Independent with ADLs and functional transfers, Independent with homemaking with ambulation  ADL Assistance: Independent  Homemaking Assistance: Independent  Ambulatory Assistance: Independent (using SPC for ambulation)  Prior Function Comments: Pt reports does all self care independently and does all cooking, cleaning and laundry (does not drive)  Precautions:  Precautions  Medical Precautions: Fall precautions  Vital Signs:       Objective   Pain:  Pain Assessment  Pain Assessment: 0-10  Pain Score: 0 - No pain  Cognition:  Cognition  Overall Cognitive Status: Within Functional Limits  Attention: Within Functional Limits  Insight: Within function limits  Impulsive: Within functional limits    General Assessments:  Activity Tolerance  Endurance: Tolerates 10 - 20 min exercise with multiple rests (shortness of breath with all physical activity on 3 L/min O2)    Coordination  Movements are Fluid and Coordinated: Yes    Static Sitting Balance  Static Sitting-Balance Support: No upper extremity supported, Feet supported  Static Sitting-Level of Assistance: Modified independent  Static Sitting-Comment/Number of Minutes: 2 min    Static Standing Balance  Static Standing-Balance  Support: Bilateral upper extremity supported (vs 10 sec with no UE support with CGA)  Static Standing-Level of Assistance: Close supervision, Contact guard  Static Standing-Comment/Number of Minutes: 2 min  Functional Assessments:  Bed Mobility  Bed Mobility: No (Pt seated EOB at start and end of session)    Transfers  Transfer: Yes  Transfer 1  Transfer From 1: Sit to  Transfer to 1: Stand  Technique 1: Sit to stand, Stand to sit  Transfer Device 1: Gait belt, Walker  Transfer Level of Assistance 1: Contact guard  Trials/Comments 1: Verbal cues for sequencing and increased time to complete  Transfers 2  Transfer From 2: Bed to  Transfer to 2: Chair with arms  Technique 2: Stand pivot  Transfer Device 2: Walker  Transfer Level of Assistance 2: Contact guard  Trials/Comments 2: Increased WB through BUE on FWW, shortness of breath and increased time to complete task    Ambulation/Gait Training  Ambulation/Gait Training Performed: Yes  Ambulation/Gait Training 1  Surface 1: Level tile  Device 1: Rolling walker  Gait Support Devices: Gait belt  Assistance 1: Contact guard, Minimum assistance  Quality of Gait 1:  (Pt demonstrating reduced B step length, gait conner and increased UE support on FWW. Pt limited by shortness of breath and demonstrated 1 LOB requiring RLE steppage strategy and min A to reestablish balance)  Comments/Distance (ft) 1: 35ft  Extremity/Trunk Assessments:  RLE   RLE :  (grossly 3+/5)  LLE   LLE :  (grossly 3+/5)  Outcome Measures:  [facility] Basic Mobility  Turning from your back to your side while in a flat bed without using bedrails: None  Moving from lying on your back to sitting on the side of a flat bed without using bedrails: A little  Moving to and from bed to chair (including a wheelchair): A little  Standing up from a chair using your arms (e.g. wheelchair or bedside chair): A little  To walk in hospital room: A little  Climbing 3-5 steps with railing: Total  Basic Mobility - Total Score:  17    Encounter Problems       Encounter Problems (Active)       Balance       STG - Maintains dynamic standing balance with upper extremity support       Start:  01/08/24    Expected End:  01/22/24       INTERVENTIONS:  1. Practice standing with minimal support >5 min with no notable SOB  2. Educate patient about standing tolerance.  3. Educate patient about independence with gait, transfers, and ADL's.  4. Educate patient about use of assistive device.  5. Educate patient about self-directed care.            Mobility       LTG - Patient will navigate 1 steps with device       Start:  01/08/24    Expected End:  01/22/24       CGA         STG - Patient will ambulate       Start:  01/08/24    Expected End:  01/22/24       SUP using FWW >80ft            Pain - Adult          Transfers       STG - Patient will perform bed mobility       Start:  01/08/24    Expected End:  01/22/24       Mod Ind         STG - Patient will transfer sit to and from stand       Start:  01/08/24    Expected End:  01/22/24       Mod Ind                Education Documentation  Mobility Training, taught by Deon Arnold, PT at 1/8/2024  2:12 PM.  Learner: Patient  Readiness: Acceptance  Method: Explanation  Response: Verbalizes Understanding    Education Comments  No comments found.

## 2024-01-08 NOTE — PROGRESS NOTES
1/8/2024 1:08 PM Son sent email with dami. He requests referrals to [facility], [facility] and [facility]. I requested referrals sent. Génesis Kimball [facility]

## 2024-01-08 NOTE — PROGRESS NOTES
Occupational Therapy    Evaluation    Patient Name: Aicha Lord  MRN: [id number]  Today's Date: 1/8/2024  Time Calculation  Start Time: 1223  Stop Time: 1243  Time Calculation (min): 20 min        Assessment:  OT Assessment: Pt demos decreased balance, strength, endurance/activity tolerance, ROM and safety awareness resulting in decreased safety and independence with ADLs/IADLs. Pt requires skilled OT services to address above deficits to safely return to PLOF .  Prognosis: Good  Evaluation/Treatment Tolerance: Patient limited by fatigue  Medical Staff Made Aware: Yes  End of Session Communication: Bedside nurse  End of Session Patient Position: Up in chair, Alarm on (all needs in reach)  OT Assessment Results: Decreased ADL status, Decreased upper extremity range of motion, Decreased upper extremity strength, Decreased endurance, Decreased safe judgment during ADL, Decreased functional mobility, Decreased IADLs  Prognosis: Good  Evaluation/Treatment Tolerance: Patient limited by fatigue  Medical Staff Made Aware: Yes  Strengths: Premorbid level of function  Plan:  Treatment Interventions: ADL retraining, Functional transfer training, UE strengthening/ROM, Endurance training, Patient/family training, Equipment evaluation/education, Compensatory technique education  OT Frequency: 3 times per week  OT Discharge Recommendations: Moderate intensity level of continued care  OT - OK to Discharge: Yes (OT POC established this date)  Treatment Interventions: ADL retraining, Functional transfer training, UE strengthening/ROM, Endurance training, Patient/family training, Equipment evaluation/education, Compensatory technique education    Subjective     General:  General  Reason for Referral: Pt is a 89 yo female presenting [facility] with LLQ pain - pt with recurrent diverticulitis  Referred By: JULIUS Corey-CNP  Past Medical History Relevant to Rehab: atrial fibrillation on Xarelto hypertension diabetes mellitus type 2  dyslipidemia stage III kidney disease history of primary hyperparathyroidism osteoporosis chronic pain syndrome  Family/Caregiver Present: No  Co-Treatment: PT  Co-Treatment Reason: to maximize safety and participation with skilled intervention  Prior to Session Communication: Bedside nurse  Patient Position Received: Alarm off, not on at start of session (seated at EOB eating lunch)  General Comment: Pt fully participatory in session. Pt demos SOB with exertion, not on home O2 at baseline. 3L/min supplemental O2. Pt with L UE redness near PIV, RN notified.  Precautions:  Medical Precautions: Fall precautions    Pain:  Pain Assessment  Pain Assessment: 0-10  Pain Score: 0 - No pain    Objective   Cognition:  Overall Cognitive Status: Within Functional Limits  Orientation Level: Oriented X4           Home Living:  Type of Home: House  Lives With: Adult children (son and daughter-in-law with their foster children)  Home Adaptive Equipment: Walker rolling or standard, Cane  Home Layout: One level (in-law suite off family's house.)  Home Access: Stairs to enter without rails  Entrance Stairs-Rails: None  Entrance Stairs-Number of Steps: 1  Bathroom Shower/Tub: Walk-in shower  Bathroom Toilet: Handicapped height  Bathroom Equipment: Grab bars in shower, Raised toilet seat without rails  Home Living Comments: pt reports standing to shower at baseline  Prior Function:  Level of [place]: Independent with ADLs and functional transfers, Independent with homemaking with ambulation  ADL Assistance: Independent  Homemaking Assistance: Independent  Ambulatory Assistance: Independent (using SPC for ambulation)  Prior Function Comments: pt reports Mod I with all ADLs/IADLs. - driving. denies recent falls  IADL History:     ADL:  Eating Deficit: Setup  LE Dressing Assistance: Total  LE Dressing Deficit: Don/doff R sock, Don/doff L sock (pt reports wearing slip on shoes at home)  Activity Tolerance:  Endurance: Tolerates 10 - 20  min exercise with multiple rests (SOB noted with all activity on 3L/min O2)  Bed Mobility/Transfers: Bed Mobility  Bed Mobility: No (pt seated at EOB upon arrival)    Transfers  Transfer: Yes  Transfer 1  Technique 1: Sit to stand, Stand to sit  Transfer Device 1: Walker  Transfer Level of Assistance 1: Contact guard, Minimal verbal cues  Trials/Comments 1: cues for sequencing, safe hand placement and walker safety  Transfers 2  Transfer From 2: Bed to  Transfer to 2: Chair with arms  Technique 2: Stand pivot  Transfer Device 2: Walker  Transfer Level of Assistance 2: Contact guard, Minimal verbal cues  Trials/Comments 2: cues for walker safety and alignment to chair      Ambulation/Gait Training:  Ambulation/Gait Training  Ambulation/Gait Training Performed: Yes  Ambulation/Gait Training 1  Comments/Distance (ft) 1: Pt functionally navigated x min household distance in room using FWW with CGA and assist to manage O2 tubing and IV pole. Pt demos x1 LOB with Min A to correct. Cues for sequencing and walker safety.  Sitting Balance:  Static Sitting Balance  Static Sitting-Balance Support: Bilateral upper extremity supported  Static Sitting-Level of Assistance: Independent  Static Sitting-Comment/Number of Minutes: at EOB  Dynamic Sitting Balance  Dynamic Sitting-Comments: Sup at EOB  Standing Balance:  Static Standing Balance  Static Standing-Balance Support: Bilateral upper extremity supported  Static Standing-Comment/Number of Minutes: SBA using FWW  Dynamic Standing Balance  Dynamic Standing-Comments: CGA-Min A x1 using FWW     Sensation:  Sensation Comment: Pt reports numbess in B feet  Strength:  Strength Comments: R shoulder 2+/5 (pt reports R shoulder ROM limitation at baseline), L shoulder 3-/5, B UEs distally 3/5 based on function       Extremities: KATHLEEN WANG : Exceptions to WFL (R shoulder flexion ~60 degrees AROM, distally WFL) and MARCO LARA: Exceptions to WFL (L shoulder ~90 degrees AROM, distally WFL,  redness noted around PIV, RN notified)    Outcome Measures:[facility] Daily Activity  Putting on and taking off regular lower body clothing: A lot  Bathing (including washing, rinsing, drying): A lot  Putting on and taking off regular upper body clothing: A little  Toileting, which includes using toilet, bedpan or urinal: A little  Taking care of personal grooming such as brushing teeth: A little  Eating Meals: A little  Daily Activity - Total Score: 16        Education Documentation  Body Mechanics, taught by Supriya Lai OT at 1/8/2024  4:25 PM.  Learner: Patient  Readiness: Acceptance  Method: Explanation  Response: Verbalizes Understanding    Precautions, taught by Supriya Lai OT at 1/8/2024  4:25 PM.  Learner: Patient  Readiness: Acceptance  Method: Explanation  Response: Verbalizes Understanding    ADL Training, taught by Supriya Lai OT at 1/8/2024  4:25 PM.  Learner: Patient  Readiness: Acceptance  Method: Explanation  Response: Verbalizes Understanding    Education Comments  No comments found.             Goals:  Encounter Problems       Encounter Problems (Active)       ADLs       Patient will perform UB and LB bathing with modified independent level of assistance and grab bars, shower chair, and long-handled sponge.       Start:  01/08/24    Expected End:  01/22/24            Patient with complete upper body dressing with modified independent level of assistance donning and doffing all UE clothes with PRN adaptive equipment.       Start:  01/08/24    Expected End:  01/22/24            Patient with complete lower body dressing with modified independent level of assistance donning and doffing all LE clothes  with PRN adaptive equipment.       Start:  01/08/24    Expected End:  01/22/24            Patient will complete all daily grooming tasks with independent level of assistance and PRN adaptive equipment.       Start:  01/08/24    Expected End:  01/22/24            Patient will complete toileting  including hygiene clothing management/hygiene with modified independent level of assistance and raised toilet seat and grab bars.       Start:  01/08/24    Expected End:  01/22/24                       MOBILITY       Patient will perform Functional mobility x Household distances/Community Distances with modified independent level of assistance and least restrictive device in order to improve safety and functional mobility.       Start:  01/08/24    Expected End:  01/22/24                       TRANSFERS       Patient will perform bed mobility independent level of assistance in order to improve safety and independence with mobility       Start:  01/08/24    Expected End:  01/22/24            Patient will complete all functional transfers with least restrictive device with modified independent level of assistance.       Start:  01/08/24    Expected End:  01/22/24

## 2024-01-08 NOTE — DISCHARGE INSTRUCTIONS
Central Venous Catheters (CVC)- Patient and Family Education    What is a Central Venous Catheter (CVC)?  A Central Venous Catheter (CVC) is a long tube used when you need special fluids or  medicines, blood samples for testing, or blood transfusions (like red blood cells or platelets). It  is made of a material that is soft and easy to bend. It may have 1, 2, or 3 tubes. Once the  catheter is put in, it stays in place. This means you should not need an IV placed in your arm for  fluids or medicines.    Before the Procedure:  ? If you take blood-thinning medications, you Must ask your doctor when you should stop  taking the medications. You may need to stop taking the blood-thinning medication up to  7 days prior to the exam.  ? You will have a blood sample drawn.  ? Do Not Drink or Eat Anything 8 hours before your procure unless instructed otherwise.  ? You may take any medications you normally take with small amount of clear liquid.    During the Procedure:  ? You will lie on a cushioned X-Ray table.  ? An Intravenous (IV) line will be placed in your arm.  ? You may be given medicine to help you relax during the test through the IV.  ? Part of your chest or arm will be washed with a germ killing solution to lessen the risk of  infection.  ? The area where the line is inserted will be numbed with medicine before your provider  inserts the line.  ? To place the CVC, two small skin cuts are made: one near the collarbone (insertion site)  and the other lower on your chest (exit site). A path is made under the skin between  these incisions. The catheter is then pulled through the path.  ? One end of the catheter is threaded into the large vein that leads to the heart. The other  end is outside your body and has a special cap. This is called the “injection port”.  Medications and blood products can be given through this port.  ? You will be awake during the test and your doctor will ask you to follow simple  commands.  You may ask questions during the test.    After the Procedure:  ? After you have returned to recovery area, your vital signs will be checked often.  ? You will stay in bed with your head slightly raised, for 1-2 hours.  ? Your procedure site will be checked for bleeding or swelling. Some bruising is normal.  If you see bleeding, apply pressure with your fingertips and call your nurse right away. If  you feel swelling or increased pain at the site, call your nurse.    Line Care:  ? The ends of the catheter are called ports and will be used many times. To lessen the risk  of infection the green protection capsports cap(s) will need to be changed at least once a  week or more frequently if needed.  ? At the exit site, there is a small cuff that lies under the skin. Your body will form a small  scar around this cuff to keep the catheter from moving and stitches will also hold the  catheter in place.  ? The site will be covered with a dressing and will need special care. Your nurse will teach you   or your family member how to change the  dressing. The incision at the insertion site will heal in a short period of time, up to 2  weeks.  ? Each time the catheter is used, it must be flushed out. This is done to keep it open and  free of blood clots. Some catheters require flushing with sterile saline while others use  both saline and heparin which is an “anticoagulant” to keep the blood inside the catheter  from clotting.  ? Remember to cover the dressing over the catheter and skin entry site when showering.  Avoid situations where water may sit on the entry site for prolonged periods (pools,  baths, Jacuzzis, etc.).    Follow these Guidelines for a Safer Recovery:    Activity:  ? Rest by sitting up for 4 - 6 hours.  ? Limit activity for 24 hours after the procedure.  ? No driving for 24 hours.  ? Do not do any heavy lifting, over 10 pounds, for 48 hours.  ? Avoid intense exercise and contact sports for 48  hours.    Diet:  ? You may resume your normal diet.    Medicines:  ? If you take blood thinning medications, ask your doctor when you can take these again.  ? You may take your other medicines as ordered by your doctor.  ? Bleeding from the procedure site.    If you go home after the procedure:  ? You must have someone drive you home after the test. You will not be allowed to drive  or travel home alone in a cab or on a bus. You should not drive for 24 hours after the  test.  ? Someone should stay with you through the night.  ? Resume your regular diet and medicines.  ? Rest until morning and avoid strenuous activities for the next 2 days.  ? Avoid getting the site wet if it is accessed with a needle or not completely healed after the  procedure.    Call Your Doctor Right Away:  ? Bleeding from the procedure site.  ? Shortness of breath or trouble breathing.  ? Increase in pain at the site.  ? Dizziness or fainting.  ? Shortness of breath or trouble breathing.  ? Increased pain at the procedure site.  ? Dizziness or fainting,  If you are not able to contact your doctor, call 911 and go to the nearest Emergency Room.    Also, Call your Doctor:  ? If the end caps come off. Be sure the line is clamped.  ? If there is any bleeding around the tube.  ? If there is any signs of infection including:  o Redness, swelling or pus-like drainage at the biopsy site.  o Night sweats.  o Pain or tenderness at the procedure site.  o Fever of 100.4 degrees F or higher.  o Shaking or chills.  ? If the tube is damaged.  ? If you develop swelling in the arm, chest or neck on the tube side.  ? If the tube comes out.  o Do not try to push it back in. Apply pressure for 10 minutes where the tube came  out with a clean gauze or clean towel. If the bleeding does not stop, continue to  apply pressure and have someone take you to the nearest emergency room. If you  have trouble, call 911 and lie on your left side with your head down.  ? If you  have any questions.     How to Reach your Doctor:    Call [phone] with problems or questions    This info is a general resource. It is not meant to replace your health care provider’s advice.  Ask your doctor or health care team any questions. Always follow their instructions.

## 2024-01-08 NOTE — PROGRESS NOTES
"Aicha Lord is a 89 y.o. female     Feels ok   Does have difficulty breathing  Per daughter at baseline she always appear to be having some difficulty breathign   Not on oxygen at home    Review of Systems     Constitutional: no fever, no chills, not feeling poorly, not feeling tired   Cardiovascular: no chest pain   Respiratory: no cough, wheezing or shortness of breath diminished at bases  Gastrointestinal:   Neurological: no headache,   All other systems have been reviewed and are negative for complaint.   Back pt does have kyphosis      Blood pressure 137/73, pulse 65, temperature 36.7 °C (98 °F), temperature source Tympanic, resp. rate 18, height 1.499 m (4' 11\"), weight 61.7 kg (136 lb), SpO2 97 %.       Scheduled medications  cefepime, 1 g, intravenous, q12h  [Held by provider] furosemide, 40 mg, oral, Daily  melatonin, 3 mg, oral, Daily  metroNIDAZOLE, 500 mg, oral, q8h CALVIN  pantoprazole, 40 mg, oral, Daily before breakfast   Or  pantoprazole, 40 mg, intravenous, Daily before breakfast  polyethylene glycol, 17 g, oral, Daily  pregabalin, 25 mg, oral, Daily  psyllium, 1 packet, oral, Daily  rivaroxaban, 15 mg, oral, Daily with evening meal  rosuvastatin, 5 mg, oral, Nightly  [Held by provider] spironolactone, 25 mg, oral, Daily      Continuous medications       PRN medications  PRN medications: acetaminophen **OR** acetaminophen **OR** acetaminophen, albuterol, dextrose 10 % in water (D10W), dextrose, diphenhydrAMINE, glucagon, guaiFENesin, ondansetron **OR** ondansetron    Lab Review   Results from last 7 days   Lab Units 01/07/24  0626 01/06/24  0600 01/05/24  0626   WBC AUTO x10*3/uL 4.5 4.6 5.5   HEMOGLOBIN g/dL 10.5* 9.9* 10.7*   HEMATOCRIT % 34.0* 31.7* 34.8*   PLATELETS AUTO x10*3/uL 185 171 152       Results from last 7 days   Lab Units 01/07/24  0626 01/06/24  0600 01/05/24  0626 01/02/24  1234 01/01/24  2131   SODIUM mmol/L 138 137 132*   < > 132*   POTASSIUM mmol/L 3.9 3.6 3.7   < > 3.9 "   CHLORIDE mmol/L 113* 111* 106   < > 95*   CO2 mmol/L 17* 16* 16*   < > 25   BUN mg/dL 15 19 21   < > 24*   CREATININE mg/dL 0.96 1.10* 1.05   < > 1.24*   CALCIUM mg/dL 8.9 8.6 8.8   < > 10.9*   PROTEIN TOTAL g/dL  --   --   --   --  7.0   BILIRUBIN TOTAL mg/dL  --   --   --   --  1.2   ALK PHOS U/L  --   --   --   --  70   ALT U/L  --   --   --   --  12   AST U/L  --   --   --   --  19   GLUCOSE mg/dL 90 94 90   < > 141*    < > = values in this interval not displayed.              XR chest 1 view   Final Result   Enlarged cardiac silhouette. Question of faint parenchymal   infiltration.        MACRO:   none        Signed by: Rasheeda Healy 1/7/2024 10:09 AM   Dictation workstation:   [id number]      Transthoracic Echo (TTE) Complete   Final Result      CT abdomen pelvis w IV contrast   Final Result   1.  Diffuse diverticulosis with focal thickening of the inferior wall   of the mid sigmoid: And associated small amount of ascitic fluid in   the pelvis consistent with sigmoid diverticulitis. This is similar in   location compared to 2016.  no evidence of free air or fluid   collection.   2. Moderate-sized hiatal hernia filled with fluid suggestive of   reflux esophagitis.   3. Osteopenia with age indeterminate compression deformity of the   inferior endplate of L1 new from 2016. Correlation with point   tenderness is recommended.   4. Cholelithiasis without cholecystitis. Right greater than left   renal atrophy similar to prior.             Signed by: Saleem Gill 1/1/2024 11:48 PM   Dictation workstation:   [id number]        Cxr reviewed     Physical Exam     Constitutional   General appearance: Alert and in no acute distress.     Pulmonary   Respiratory assessment: No respiratory distress, normal respiratory rhythm and effort.    Auscultation of Lungs: Clear bilateral breath sounds.  Diminished at bases  Cardiovascular   Auscultation of heart: Apical pulse normal, heart rate and rhythm normal, normal S1 and S2,  no murmurs and no pericardial rub.    Exam for edema: No peripheral edema.   Abdomen   Abdominal Exam: Left lower quadrant tenderness  Liver and Spleen exam: No hepato-splenomegaly.   Musculoskeletal   Examination of gait: ROM intact  Skin inspection: Normal skin color and pigmentation, normal skin turgor and no visible rash.   Neurologic   Cranial nerves: Nerves 2-12 were intact, no focal neuro defects.     Assessment/Plan      #Acute recurrent diverticulitis  #Bacteremia  Cultures form 1/5/24 negative  Continue IV antibiotics  Echo with no vegetation     #Atrial fibrillation  Continue Xarelto     #Dyslipidemia  #Diabetes mellitus  Sliding-scale insulin coverage     #Hyponatremia  #Chronic kidney disease stage IIIb  Monitor while on fluids    Stop fluids  Iv lasix     Detailed dw pt and family by estela

## 2024-01-08 NOTE — PROGRESS NOTES
"  INFECTIOUS DISEASE DAILY PROGRESS NOTE    SUBJECTIVE:    No overnight events. Afebrile. No rash/itching/diarrhea. Lots of arm/leg swelling.    OBJECTIVE:  VITALS (Last 24 Hours)  /52   Pulse 51   Temp 36.6 °C (97.9 °F)   Resp 20   Ht 1.499 m (4' 11\")   Wt 61.7 kg (136 lb)   SpO2 96%   BMI 27.47 kg/m²     PHYSICAL EXAM:  Gen - NAD, laying in bed  Heart - RRR  Abd - soft, no ttp, BS present  Skin - no rashes     ABX: IV Cefepime, PO Flagyl    LABS:  Lab Results   Component Value Date    WBC 5.1 01/08/2024    HGB 10.5 (L) 01/08/2024    HCT 34.6 (L) 01/08/2024    MCV 91 01/08/2024     01/08/2024     Lab Results   Component Value Date    GLUCOSE 120 (H) 01/08/2024    CALCIUM 8.6 01/08/2024     (L) 01/08/2024    K 3.7 01/08/2024    CO2 15 (L) 01/08/2024     (H) 01/08/2024    BUN 14 01/08/2024    CREATININE 0.96 01/08/2024         Estimated Creatinine Clearance: 27.8 mL/min (by C-G formula based on SCr of 0.96 mg/dL).      ASSESSMENT/PLAN:     Complicated Sigmoid Diverticulitis - resolving  Bacteremia due to E. Coli, Pseudomonas, GPC pairs/chains (gut anaerobe) - blood cx clear 1/5, TTE without endocarditis, no need for PAUL  Multiple Abx Allergies - penicillin, cipro, bactrim. Limits abx options.  CKD - CrCl 27, affects abx dosing    Ordering Jj catheter - this will need to be removed once abx are completed.    IV Cefepime 1g Q12H through 1/18/24. PO Flagyl 500mg TID through 1/18/24.    Labs on 1/15/24 CBC/diff and Creatinine fax to Dr. Alcala [phone].    Does not need ID follow-up with me at this time.    Will sign off. Please call back with questions. Thanks!    Maninder Alcala MD  [facility]  Office #[phone]      "

## 2024-01-08 NOTE — PROGRESS NOTES
01/08/24 1122   Discharge Planning   Patient expects to be discharged to: home vs snf   Does the patient need discharge transport arranged? Yes   RoundTrip coordination needed? Yes   Has discharge transport been arranged? Yes     Spoke to patient son  Gabino [phone] who wants patient to go for further rehabilitation, to get stronger, awaiting therapy recommendation patient son also informed patient will need home IV abx if plan is home patient son has family members to asst with IV abx infusion, snf list to be emailed to son

## 2024-01-09 ENCOUNTER — APPOINTMENT (OUTPATIENT)
Dept: CARDIOLOGY | Facility: HOSPITAL | Age: 89
DRG: 392 | End: 2024-01-09
Payer: MEDICARE

## 2024-01-09 LAB
ANION GAP SERPL CALC-SCNC: 11 MMOL/L (ref 10–20)
BACTERIA BLD CULT: NORMAL
BACTERIA BLD CULT: NORMAL
BUN SERPL-MCNC: 12 MG/DL (ref 6–23)
CALCIUM SERPL-MCNC: 9.4 MG/DL (ref 8.6–10.3)
CHLORIDE SERPL-SCNC: 110 MMOL/L (ref 98–107)
CO2 SERPL-SCNC: 19 MMOL/L (ref 21–32)
CREAT SERPL-MCNC: 0.84 MG/DL (ref 0.5–1.05)
EGFRCR SERPLBLD CKD-EPI 2021: 65 ML/MIN/1.73M*2
ERYTHROCYTE [DISTWIDTH] IN BLOOD BY AUTOMATED COUNT: 16.6 % (ref 11.5–14.5)
GLUCOSE BLD MANUAL STRIP-MCNC: 186 MG/DL (ref 74–99)
GLUCOSE BLD MANUAL STRIP-MCNC: 221 MG/DL (ref 74–99)
GLUCOSE SERPL-MCNC: 117 MG/DL (ref 74–99)
HCT VFR BLD AUTO: 37.6 % (ref 36–46)
HGB BLD-MCNC: 11.3 G/DL (ref 12–16)
MCH RBC QN AUTO: 26.9 PG (ref 26–34)
MCHC RBC AUTO-ENTMCNC: 30.1 G/DL (ref 32–36)
MCV RBC AUTO: 90 FL (ref 80–100)
NRBC BLD-RTO: 0 /100 WBCS (ref 0–0)
PLATELET # BLD AUTO: 232 X10*3/UL (ref 150–450)
POTASSIUM SERPL-SCNC: 4 MMOL/L (ref 3.5–5.3)
RBC # BLD AUTO: 4.2 X10*6/UL (ref 4–5.2)
SODIUM SERPL-SCNC: 136 MMOL/L (ref 136–145)
WBC # BLD AUTO: 5.4 X10*3/UL (ref 4.4–11.3)

## 2024-01-09 PROCEDURE — 2500000001 HC RX 250 WO HCPCS SELF ADMINISTERED DRUGS (ALT 637 FOR MEDICARE OP): Performed by: NURSE PRACTITIONER

## 2024-01-09 PROCEDURE — 2500000004 HC RX 250 GENERAL PHARMACY W/ HCPCS (ALT 636 FOR OP/ED): Performed by: INTERNAL MEDICINE

## 2024-01-09 PROCEDURE — 85027 COMPLETE CBC AUTOMATED: CPT | Performed by: NURSE PRACTITIONER

## 2024-01-09 PROCEDURE — 2500000004 HC RX 250 GENERAL PHARMACY W/ HCPCS (ALT 636 FOR OP/ED): Performed by: NURSE PRACTITIONER

## 2024-01-09 PROCEDURE — 82947 ASSAY GLUCOSE BLOOD QUANT: CPT

## 2024-01-09 PROCEDURE — 36415 COLL VENOUS BLD VENIPUNCTURE: CPT | Performed by: NURSE PRACTITIONER

## 2024-01-09 PROCEDURE — 94760 N-INVAS EAR/PLS OXIMETRY 1: CPT

## 2024-01-09 PROCEDURE — 2500000001 HC RX 250 WO HCPCS SELF ADMINISTERED DRUGS (ALT 637 FOR MEDICARE OP): Performed by: INTERNAL MEDICINE

## 2024-01-09 PROCEDURE — 80048 BASIC METABOLIC PNL TOTAL CA: CPT | Performed by: NURSE PRACTITIONER

## 2024-01-09 PROCEDURE — 99232 SBSQ HOSP IP/OBS MODERATE 35: CPT | Performed by: INTERNAL MEDICINE

## 2024-01-09 PROCEDURE — 93010 ELECTROCARDIOGRAM REPORT: CPT | Performed by: INTERNAL MEDICINE

## 2024-01-09 PROCEDURE — 2500000004 HC RX 250 GENERAL PHARMACY W/ HCPCS (ALT 636 FOR OP/ED)

## 2024-01-09 PROCEDURE — 1100000001 HC PRIVATE ROOM DAILY

## 2024-01-09 PROCEDURE — 93005 ELECTROCARDIOGRAM TRACING: CPT

## 2024-01-09 PROCEDURE — 99222 1ST HOSP IP/OBS MODERATE 55: CPT | Performed by: INTERNAL MEDICINE

## 2024-01-09 RX ORDER — FUROSEMIDE 10 MG/ML
40 INJECTION INTRAMUSCULAR; INTRAVENOUS EVERY 12 HOURS
Status: DISCONTINUED | OUTPATIENT
Start: 2024-01-09 | End: 2024-01-12 | Stop reason: HOSPADM

## 2024-01-09 RX ORDER — FUROSEMIDE 40 MG/1
40 TABLET ORAL
Status: DISCONTINUED | OUTPATIENT
Start: 2024-01-09 | End: 2024-01-12 | Stop reason: HOSPADM

## 2024-01-09 RX ADMIN — FUROSEMIDE 40 MG: 10 INJECTION, SOLUTION INTRAMUSCULAR; INTRAVENOUS at 18:24

## 2024-01-09 RX ADMIN — PREGABALIN 25 MG: 25 CAPSULE ORAL at 09:22

## 2024-01-09 RX ADMIN — METRONIDAZOLE 500 MG: 500 TABLET ORAL at 13:59

## 2024-01-09 RX ADMIN — Medication 3 MG: at 20:44

## 2024-01-09 RX ADMIN — RIVAROXABAN 15 MG: 15 TABLET, FILM COATED ORAL at 18:17

## 2024-01-09 RX ADMIN — CEFEPIME 1 G: 1 INJECTION, POWDER, FOR SOLUTION INTRAMUSCULAR; INTRAVENOUS at 20:44

## 2024-01-09 RX ADMIN — METRONIDAZOLE 500 MG: 500 TABLET ORAL at 06:00

## 2024-01-09 RX ADMIN — ACETAMINOPHEN 650 MG: 325 TABLET ORAL at 00:20

## 2024-01-09 RX ADMIN — METRONIDAZOLE 500 MG: 500 TABLET ORAL at 21:55

## 2024-01-09 RX ADMIN — ACETAMINOPHEN 650 MG: 325 TABLET ORAL at 19:57

## 2024-01-09 RX ADMIN — PANTOPRAZOLE SODIUM 40 MG: 40 TABLET, DELAYED RELEASE ORAL at 06:00

## 2024-01-09 RX ADMIN — ROSUVASTATIN 5 MG: 10 TABLET, FILM COATED ORAL at 20:44

## 2024-01-09 RX ADMIN — CEFEPIME 1 G: 1 INJECTION, POWDER, FOR SOLUTION INTRAMUSCULAR; INTRAVENOUS at 09:23

## 2024-01-09 ASSESSMENT — PAIN - FUNCTIONAL ASSESSMENT
PAIN_FUNCTIONAL_ASSESSMENT: 0-10

## 2024-01-09 ASSESSMENT — COGNITIVE AND FUNCTIONAL STATUS - GENERAL
STANDING UP FROM CHAIR USING ARMS: A LITTLE
EATING MEALS: A LITTLE
MOVING FROM LYING ON BACK TO SITTING ON SIDE OF FLAT BED WITH BEDRAILS: A LITTLE
DRESSING REGULAR UPPER BODY CLOTHING: A LOT
MOBILITY SCORE: 16
MOVING TO AND FROM BED TO CHAIR: A LITTLE
DRESSING REGULAR LOWER BODY CLOTHING: A LOT
CLIMB 3 TO 5 STEPS WITH RAILING: A LOT
WALKING IN HOSPITAL ROOM: A LOT
TURNING FROM BACK TO SIDE WHILE IN FLAT BAD: A LITTLE
DAILY ACTIVITIY SCORE: 13
TOILETING: A LOT
HELP NEEDED FOR BATHING: A LOT
PERSONAL GROOMING: A LOT

## 2024-01-09 ASSESSMENT — PAIN SCALES - WONG BAKER: WONGBAKER_NUMERICALRESPONSE: NO HURT

## 2024-01-09 ASSESSMENT — PAIN SCALES - GENERAL
PAINLEVEL_OUTOF10: 0 - NO PAIN
PAINLEVEL_OUTOF10: 3
PAINLEVEL_OUTOF10: 0 - NO PAIN

## 2024-01-09 ASSESSMENT — PAIN DESCRIPTION - LOCATION: LOCATION: BACK

## 2024-01-09 NOTE — PROGRESS NOTES
Aicha Lord is a 89 y.o. female     S/p PICC line  Noted to have pauses on telemetry  Beta-blockers held    Review of Systems     Constitutional: no fever, no chills, not feeling poorly, not feeling tired   Cardiovascular: no chest pain   Respiratory: no cough, wheezing or shortness of breath a  Gastrointestinal:   Neurological: no headache,   All other systems have been reviewed and are negative for complaint.       Vitals:    01/09/24 1547   BP: (!) 140/44   Pulse: 52   Resp: 19   Temp: 36.8 °C (98.3 °F)   SpO2: 97%        Scheduled medications  cefepime, 1 g, intravenous, q12h  furosemide, 40 mg, intravenous, q12h  [Held by provider] furosemide, 40 mg, oral, BID with meals  melatonin, 3 mg, oral, Daily  metroNIDAZOLE, 500 mg, oral, q8h CALVIN  pantoprazole, 40 mg, oral, Daily before breakfast   Or  pantoprazole, 40 mg, intravenous, Daily before breakfast  polyethylene glycol, 17 g, oral, Daily  pregabalin, 25 mg, oral, Daily  psyllium, 1 packet, oral, Daily  rivaroxaban, 15 mg, oral, Daily with evening meal  rosuvastatin, 5 mg, oral, Nightly  [Held by provider] spironolactone, 25 mg, oral, Daily      Continuous medications       PRN medications  PRN medications: acetaminophen **OR** acetaminophen **OR** acetaminophen, albuterol, dextrose 10 % in water (D10W), dextrose, diphenhydrAMINE, glucagon, guaiFENesin, lidocaine-epinephrine, ondansetron **OR** ondansetron    Lab Review   Results from last 7 days   Lab Units 01/09/24 0625 01/08/24  0633 01/07/24  0626   WBC AUTO x10*3/uL 5.4 5.1 4.5   HEMOGLOBIN g/dL 11.3* 10.5* 10.5*   HEMATOCRIT % 37.6 34.6* 34.0*   PLATELETS AUTO x10*3/uL 232 191 185       Results from last 7 days   Lab Units 01/09/24 0625 01/08/24  0633 01/07/24  0626   SODIUM mmol/L 136 135* 138   POTASSIUM mmol/L 4.0 3.7 3.9   CHLORIDE mmol/L 110* 112* 113*   CO2 mmol/L 19* 15* 17*   BUN mg/dL 12 14 15   CREATININE mg/dL 0.84 0.96 0.96   CALCIUM mg/dL 9.4 8.6 8.9   GLUCOSE mg/dL 117* 120* 90               IR CVC tunneled   Final Result   Ultrasound and fluoroscopically guided right internal jugular vein   tunneled small bore central venous catheter. The catheter is ready   for immediate use.             Signed by: Sravan Moss 1/9/2024 10:51 AM   Dictation workstation:   [id number]      XR chest 1 view   Final Result   Enlarged cardiac silhouette. Question of faint parenchymal   infiltration.        MACRO:   none        Signed by: Rasheeda Healy 1/7/2024 10:09 AM   Dictation workstation:   [id number]      Transthoracic Echo (TTE) Complete   Final Result      CT abdomen pelvis w IV contrast   Final Result   1.  Diffuse diverticulosis with focal thickening of the inferior wall   of the mid sigmoid: And associated small amount of ascitic fluid in   the pelvis consistent with sigmoid diverticulitis. This is similar in   location compared to 2016.  no evidence of free air or fluid   collection.   2. Moderate-sized hiatal hernia filled with fluid suggestive of   reflux esophagitis.   3. Osteopenia with age indeterminate compression deformity of the   inferior endplate of L1 new from 2016. Correlation with point   tenderness is recommended.   4. Cholelithiasis without cholecystitis. Right greater than left   renal atrophy similar to prior.             Signed by: Saleem Gill 1/1/2024 11:48 PM   Dictation workstation:   [id number]      Holter Or Event Cardiac Monitor    (Results Pending)         Physical Exam     Constitutional   General appearance: Alert and in no acute distress.     Pulmonary   Respiratory assessment: No respiratory distress, normal respiratory rhythm and effort.    Auscultation of Lungs: Clear bilateral breath sounds.   Cardiovascular   Auscultation of heart: Apical pulse normal, heart rate and rhythm normal, normal S1 and S2, no murmurs and no pericardial rub.    Exam for edema: No peripheral edema.   Abdomen   Abdominal Exam: Left lower quadrant tenderness  Liver and Spleen exam: No  hepato-splenomegaly.   Musculoskeletal   Examination of gait: ROM intact  Skin inspection: Normal skin color and pigmentation, normal skin turgor and no visible rash.   Neurologic   Cranial nerves: Nerves 2-12 were intact, no focal neuro defects.     Assessment/Plan      #Acute recurrent diverticulitis  #Bacteremia  Status post PICC line  TTE is okay  IV cefepime 1 g every 12 through 1/8/2024  P.o. Flagyl 3 times daily through 1/18/2024     #Atrial fibrillation  Continue Xarelto  Holding beta-blocker  Will discharge with event monitor because of pauses noted on telemetry     #Dyslipidemia  #Diabetes mellitus  Sliding-scale insulin coverage    #Deconditioning  PT OT consulted     #Hyponatremia  #Chronic kidney disease stage IIIb  Monitor while on fluids

## 2024-01-09 NOTE — CARE PLAN
The clinical goals for the shift include Patient will remain hemodynamically stable throughout shift.

## 2024-01-09 NOTE — CONSULTS
"Inpatient consult to Cardiology  Consult performed by: MERCY Rodriguez  Consult ordered by: MERCY Aguero  Reason for consult: A-fib with slow ventricular response and pauses noted on telemetry  Assessment/Recommendations: Atrial fibrillation with Slow Ventricular reponse with longest pause being 2.83 seconds occurring at 08:43. This does not meet criteria for device implantation nor does it meet definition for sick sinus syndrome. For now, continue to hold her home beta blocker as you are.   -- Discharge on 14 day event monitor with results to Dr COOKIE King and follow up within 6 weeks for review of monitor.   She does appear to be quite volume up in the setting of supportive IV fluids for diverticulitis while her diuretics were held. We would recommend diuresis with IV Lasix while she remains admitted and certainly would resume her oral diuretics at discharge.       History Of Present Illness:    Cards: Facundo King(last office visit 10/23/2023)  Aicha Lord is a 89 y.o. female presenting with history of longstanding persistent atrial fibrillation on Xarelto, type II diabetic and CKD presented to [facility] ED on January 1 with abdominal pain status post diverticulitis and bacteremia 2/2 Ecoli.  Patient was started on IV antibiotics status post PICC line today.  Cardiology was consulted for \"A-fib with slow ventricular response and pauses noted on telemetry\" of note patient has a long history of persistent atrial fibrillation where she follows with outpatient cardiology last office visit was in October 2023.    Afebrile, heart rate 58, blood pressure 139/93, 95% on 3L (not on O2 at home).   Notable labs today BUNs/CR/44, H&H stable at 11.3/37.6, EKG currently shows atrial fibrillation with rates in the 50s no acute signs of ischemia.  Chest x-ray yesterday showed cardiac silhouette is enlarged. There are atherosclerotic changes of the aorta. The lungs appear somewhat hyperinflated. There appear " to be faint  parenchymal infiltrates. There may be bilateral small effusions. The lungs appear worse.  While here her Lasix and Aldactone have been held she has been getting fluids and antibiotics; as well as her metoprolol succinate has been on hold as well. She looks hypervolemic on exam. Telemetry shows Afib with slow RVR; patient asymptomatic. She does complain about her extremities being swollen. Lasix and Aldactone have been on hold with fluids given.       Past Cardiology Tests (Last 3 Years):  1/5/2024 TTE 1. Left ventricular systolic function is normal with a 55-60% estimated ejection fraction.   2. The left atrium is severely dilated.   3. Mildly elevated RVSP.   4. Mild aortic valve stenosis.   5. Mild aortic valve regurgitation.    Past Medical History:  She has a past medical history of Abdominal pain of multiple sites (04/11/2023), Age-related nuclear cataract, left (04/11/2023), Age-related nuclear cataract, right (04/11/2023), Anemia (04/11/2023), Appetite loss (04/11/2023), Arrhythmia (04/11/2023), Change in bowel movement (04/11/2023), Cramp and spasm (10/05/2016), Dizziness and giddiness (02/25/2019), Edema (04/11/2023), Essential (primary) hypertension (12/08/2022), Hyperglycemia (04/11/2023), Hyperlipidemia, unspecified (12/08/2022), Hypermetropia, bilateral (01/24/2018), Iron deficiency anemia, unspecified (02/28/2019), Long term (current) use of antibiotics (05/06/2014), Longstanding persistent atrial fibrillation (CMS/HCC) (10/23/2023), Macular degeneration, 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), Pain in unspecified hand (08/07/2014), Pain of left hand (04/11/2023), Palpitations (06/08/2023), Personal history of other diseases of the circulatory system, Personal history of other diseases of the digestive system (07/25/2016), Personal history of other diseases of the musculoskeletal system and connective tissue (07/01/2014), Personal history of other diseases of the  respiratory system (04/01/2014), Personal history of other drug therapy (10/29/2018), Personal history of other endocrine, nutritional and metabolic disease, Personal history of other endocrine, nutritional and metabolic disease (10/02/2015), Personal history of other endocrine, nutritional and metabolic disease (01/31/2014), Personal history of other specified conditions (02/05/2019), Personal history of other specified conditions (02/28/2019), Personal history of pulmonary embolism (12/27/2019), Pneumonia, unspecified organism (01/30/2014), Prediabetes (04/30/2018), Subluxation of left index finger (04/11/2023), Type 2 diabetes mellitus without complications (CMS/HCC) (02/25/2019), Unspecified atrial fibrillation (CMS/HCC) (12/08/2022), Unspecified cataract, and Unspecified epiphora, unspecified side (07/25/2018).    Past Surgical History:  She has a past surgical history that includes Other surgical history (10/29/2013); Cataract extraction (04/17/2018); Breast lumpectomy (06/13/2017); Foot surgery (01/30/2014); Total knee arthroplasty (01/30/2014); Mastectomy (01/30/2014); US guided thyroid biopsy (8/21/2014); and IR CVC tunneled (1/8/2024).      Social History:  She reports that she has never smoked. She has never used smokeless tobacco. She reports that she does not currently use alcohol. She reports that she does not currently use drugs.    Family History:  Family History   Problem Relation Name Age of Onset    Other (cardiac disorder) Father      Hypertension Father      Hodgkin's lymphoma Brother      Hypertension Paternal Grandmother      Hypertension Paternal Grandfather      Hodgkin's lymphoma Other family history         Allergies:  Celecoxib, Ciprofloxacin, Penicillins, Promethazine, Statins-hmg-coa reductase inhibitors, Sulfamethoxazole-trimethoprim, and Tramadol    ROS:  10 point review of systems including (Constitutional, Eyes, ENMT, Respiratory, Cardiac, Gastrointestinal, Neurological,  "Psychiatric, and Hematologic) was performed and is otherwise negative.    Objective Data:  Last Recorded Vitals:  Vitals:    24 0300 24 0757 24 0919 24 1131   BP: 154/50 (!) 170/46 156/52 (!) 139/93   BP Location: Left arm Left arm Left arm Left arm   Patient Position: Lying Lying Lying Lying   Pulse: 62 85  58   Resp: 17 18  18   Temp: 36.1 °C (97 °F) 36.6 °C (97.9 °F)  36.4 °C (97.5 °F)   TempSrc: Temporal Temporal  Temporal   SpO2: 100% 98%  97%   Weight:       Height:         Medical Gas Therapy: Supplemental oxygen  O2 Delivery Method: Nasal cannula  Weight  Av.7 kg (136 lb 0.2 oz)  Min: 61.7 kg (136 lb)  Max: 61.7 kg (136 lb 0.4 oz)      LABS:  CMP:  Results from last 7 days   Lab Units 24  0633 24  0803   SODIUM mmol/L 136 135* 138 137 132* 132* 129*   POTASSIUM mmol/L 4.0 3.7 3.9 3.6 3.7 3.8 3.9   CHLORIDE mmol/L 110* 112* 113* 111* 106 105 100   CO2 mmol/L 19* 15* 17* 16* 16* 20* 20*   ANION GAP mmol/L 11 12 12 14 14 11 13   BUN mg/dL 12 14 15 19 21 24* 31*   CREATININE mg/dL 0.84 0.96 0.96 1.10* 1.05 1.13* 1.34*   EGFR mL/min/1.73m*2 65 55* 55* 47* 50* 45* 37*     CBC:  Results from last 7 days   Lab Units 24  0633 24  0803   WBC AUTO x10*3/uL 5.4 5.1 4.5 4.6 5.5 6.2 8.1   HEMOGLOBIN g/dL 11.3* 10.5* 10.5* 9.9* 10.7* 9.8* 9.5*   HEMATOCRIT % 37.6 34.6* 34.0* 31.7* 34.8* 31.0* 30.2*   PLATELETS AUTO x10*3/uL 232 191 185 171 152 129* 124*   MCV fL 90 91 86 86 91 88 87     COAG:     ABO: No results found for: \"ABO\"  HEME/ENDO:     CARDIAC:       No lab exists for component: \"CK\", \"CKMBP\"          Last I/O:    Intake/Output Summary (Last 24 hours) at 2024 1423  Last data filed at 2024 0953  Gross per 24 hour   Intake 170 ml   Output 500 ml   Net -330 ml     Net IO Since Admission: 4,832.5 mL [24 " 1423]      Imaging Results:  ECG 12 lead    Result Date: 1/7/2024  Atrial fibrillation Septal infarct , age undetermined Abnormal ECG When compared with ECG of 22-AUG-2023 14:06, Septal infarct is now Present See ED provider note for full interpretation and clinical correlation Confirmed by Jimmy Cameron ([id number]) on 1/7/2024 9:44:41 PM    CT abdomen pelvis w IV contrast    Result Date: 1/1/2024  Interpreted By:  Saleem Gill, STUDY: CT ABDOMEN PELVIS W IV CONTRAST;  1/1/2024 11:06 pm   INDICATION: Signs/Symptoms:severe left lower abdominal pain for 24 hours, patient reports previous diverticulitis.   COMPARISON: CT abdomen pelvis dated 07/11/2016.   ACCESSION NUMBER(S): [id number]   ORDERING CLINICIAN: VALENTINO ORTIZ   TECHNIQUE: CT of the abdomen and pelvis was performed.  Standard contiguous axial images were obtained through the abdomen and pelvis. Coronal and sagittal reconstructions were performed.   75 ml of contrast Omnipaque 350 were administered intravenously without immediate complication.   FINDINGS: LOWER CHEST: No acute abnormality of the lung bases. Mild aortic valve and coronary artery atherosclerotic calcifications are partially visualized. BONES: There is osteopenia. There is an age indeterminate compression deformity of the inferior endplate of L1 with mild retropulsion new from 2016. There is unchanged grade 1 anterolisthesis of L4 over L5 and L5 over S1. ABDOMINAL WALL: Within normal limits. LYMPH NODES: No pathologically enlarged lymph nodes in the abdomen or pelvis.   ABDOMEN:   LIVER: Normal size and contour. No focal hepatic lesions. BILE DUCTS: Normal caliber. GALLBLADDER: There is cholelithiasis. No evidence of gallbladder wall thickening or distention. PANCREAS: No evidence of pancreatic duct dilatation or peripancreatic edema. SPLEEN: Within normal limits. ADRENALS: Mild bilateral adrenal thickening, which may represent adrenal nodular hyperplasia. KIDNEYS and URETERS: There is right  greater than left renal atrophy, similar to prior. There is normal renal enhancement pattern. Small bilateral renal hypodensities too small to characterize, likely cysts. No evidence of hydronephrosis or obstructive nephrolithiasis. There is a new 3 mm rounded high density within the right lower pole kidney which may represent a nonobstructing calculus versus vascular atherosclerotic calcification.     VESSELS: No aortic aneurysm. There are moderate atherosclerotic calcifications of the abdominal aorta and its branches. Mesenteric vessels are patent. RETROPERITONEUM: No evidence of retroperitoneal hematoma.   PELVIS:   REPRODUCTIVE ORGANS: No pelvic masses. The uterus is present. BLADDER: No gross abnormality.   BOWEL: There is moderate-sized hiatal hernia similar to prior which contains fluid suggestive of reflux esophagitis. The stomach is partially collapsed limiting assessment without evidence of wall thickening. No evidence of small bowel wall thickening or obstruction. Appendix is normal. There is diffuse colonic diverticulosis, and there is focal thickening of the inferior portion of the mid sigmoid colon consistent with diverticulitis, with a small amount of associated pelvic fluid consistent with sigmoid diverticulitis in the similar location compared to prior. PERITONEUM: No free air or organized fluid collection. Small amount of ascitic fluid in the dependent portion of the pelvis..       1.  Diffuse diverticulosis with focal thickening of the inferior wall of the mid sigmoid: And associated small amount of ascitic fluid in the pelvis consistent with sigmoid diverticulitis. This is similar in location compared to 2016.  no evidence of free air or fluid collection. 2. Moderate-sized hiatal hernia filled with fluid suggestive of reflux esophagitis. 3. Osteopenia with age indeterminate compression deformity of the inferior endplate of L1 new from 2016. Correlation with point tenderness is recommended. 4.  Cholelithiasis without cholecystitis. Right greater than left renal atrophy similar to prior.     Signed by: Saleem Gill 1/1/2024 11:48 PM Dictation workstation:   [id number]      Inpatient Medications:  Scheduled medications   Medication Dose Route Frequency    cefepime  1 g intravenous q12h    [Held by provider] furosemide  40 mg oral Daily    melatonin  3 mg oral Daily    metroNIDAZOLE  500 mg oral q8h CALVIN    pantoprazole  40 mg oral Daily before breakfast    Or    pantoprazole  40 mg intravenous Daily before breakfast    polyethylene glycol  17 g oral Daily    pregabalin  25 mg oral Daily    psyllium  1 packet oral Daily    rivaroxaban  15 mg oral Daily with evening meal    rosuvastatin  5 mg oral Nightly    [Held by provider] spironolactone  25 mg oral Daily     PRN medications   Medication    acetaminophen    Or    acetaminophen    Or    acetaminophen    albuterol    dextrose 10 % in water (D10W)    dextrose    diphenhydrAMINE    glucagon    guaiFENesin    lidocaine-epinephrine    ondansetron    Or    ondansetron     Continuous Medications   Medication Dose Last Rate       Outpatient Medications:  Current Outpatient Medications   Medication Instructions    acetaminophen (Tylenol) 500 mg tablet 2 tablets, oral, Daily PRN    albuterol 90 mcg/actuation inhaler 2 puffs, inhalation, Every 4 hours PRN    amLODIPine (NORVASC) 5 mg, oral, Daily RT    benzonatate (TESSALON) 200 mg, oral, 3 times daily PRN    calcium carbonate-vitamin D3 600 mg-10 mcg (400 unit) capsule 1 tablet, oral, Daily    cefepime 1 g in dextrose 5 % 5 % 50 mL IV 1 g, intravenous, Every 12 hours, Labs on 1/15/24 CBC/diff and Creatinine fax to Dr. Alcala [phone]. Stop date 1/18/24.    cholecalciferol (Vitamin D-3) 25 MCG (1000 UT) capsule 1 capsule, oral, Daily, TAKE AS DIRECTED.    clobetasol (Temovate) 0.05 % ointment 1 Application, Topical, 2 times daily    furosemide (Lasix) 40 mg tablet TAKE 1 TABLET TWICE A DAY BY  MOUTH     "ketoconazole (NIZOral) 2 % cream 1 Application    ketotifen (Zaditor) 0.025 % (0.035 %) ophthalmic solution ophthalmic (eye), 1 drop in both eyes 2 times a day as needed for itching/allergies    metoprolol succinate XL (TOPROL-XL) 50 mg, Daily    metroNIDAZOLE (FLAGYL) 500 mg, oral, Every 8 hours scheduled    pregabalin (Lyrica) 25 mg capsule [id number] Medication pregabalin 25 mg capsule Lyrica 25 mg 10/29/2014 Active (Outside)    rosuvastatin (CRESTOR) 5 mg, oral, Daily    spironolactone (ALDACTONE) 25 mg, oral, Daily    vit A,C and E-lutein-minerals (Ocuvite with Lutein) 300 mcg-200 mg-27 mg-2 mg tablet 1 tablet, oral, Daily    Xarelto 15 mg, oral, Daily with evening meal       Physical Exam:  General:  Patient is awake, alert, and oriented.  Patient is in no acute distress.  HEENT:  Pupils equal and reactive.  Normocephalic.  Moist mucosa.    Neck:  No thyromegaly.    Cardiovascular:  irreg/irreg.  Normal S1 and S2.  Pulmonary:  Clear to auscultation bilaterally.  Abdomen:  Soft. Non-tender.   Non-distended.  Positive bowel sounds.  Lower Extremities:  2+ pedal pulses. +2 LE edema bilateral   Neurologic:  Cranial nerves intact.  No focal deficit.   Skin: Skin warm and dry, normal skin turgor.   Psychiatric: Normal affect.     Assessment/Plan   Cards: Facundo King(last office visit 10/23/2023)  Aicha Lord is a 89 y.o. female presenting with history of longstanding persistent atrial fibrillation on Xarelto, type II diabetic and CKD presented to [facility] ED on January 1 with abdominal pain status post diverticulitis and bacteremia 2/2 Ecoli.  Patient was started on IV antibiotics status post PICC line today.  Cardiology was consulted for \"A-fib with slow ventricular response and pauses noted on telemetry\" of note patient has a long history of persistent atrial fibrillation where she follows with outpatient cardiology last office visit was in October 2023.    #Hx of Longstanding persistent atrial fibrillation  #Hx of " Chronic Diastolic CHF  #Hx of HTN  #Hx of Hyperlipidemia   -Telemetry shows atrial fibrillation with slow RVR rates in the 40s to 50s  -No AV block noted   -TTE 1/5/2024 Left ventricular systolic function is normal with a 55-60% estimated ejection fraction.  -[id number] of 5  -Continue with home Xarelto  -Toprol being held since admit-> c/w holding on discharge  -Hypervolemic on exam -> Home diuretic dose is 40 mg of Lasix twice daily-> would recommend starting diuretics on her.   -Home cardiovascular meds include Lasix 40mg BID, Crestor 5 mg daily, Aldactone 25 mg daily, metoprolol succinate 50 mg daily and amlodipine 5 mg daily  -The patient will benefit from discharge with a Zio or Preventice Patch event monitor for 2 weeks.  Zio or Preventice Patch can only be placed by [facility] nursing during regular business hours (9AM-4PM Monday-Friday) after the discharge order has been placed.  The results will be discussed at the patient's followup visit-> results to Facundo King.      Code Status:  Full Code    I spent 45 minutes in the professional and overall care of this patient.        Sun Sanford, JULIUS-JAZMINE     =================================================  Attending note   =================================================  Both the ANNELIESE and I have had a face to face encounter with the patient today. I have examined the patient and edited the documented physical examination as necessary.  I personally reviewed the patient's recent labs, medications, orders, EKGs, and pertinent cardiac imaging.  I have reviewed the ANNELIESE's encounter note, approve the ANNELIESE's documentation and have edited the note to reflect the diagnostic and therapeutic plan.    Cards: Facundo King(last office visit 10/23/2023)  Aicha Lord is a 89 y.o. female presenting with history of longstanding persistent atrial fibrillation on Xarelto, type II diabetic and CKD presented to [facility] ED on January 1 with abdominal pain status post  "diverticulitis and bacteremia 2/2 Ecoli.  Patient was started on IV antibiotics status post PICC line today.  Cardiology was consulted for \"A-fib with slow ventricular response and pauses noted on telemetry\" of note patient has a long history of persistent atrial fibrillation where she follows with outpatient cardiology last office visit was in October 2023.    Afebrile, heart rate 58, blood pressure 139/93, 95% on 3L (not on O2 at home).   Notable labs today BUNs/CR/44, H&H stable at 11.3/37.6, EKG currently shows atrial fibrillation with rates in the 50s no acute signs of ischemia.  Chest x-ray yesterday showed cardiac silhouette is enlarged. There are atherosclerotic changes of the aorta. The lungs appear somewhat hyperinflated. There appear to be faint  parenchymal infiltrates. There may be bilateral small effusions. The lungs appear worse.  While here her Lasix and Aldactone have been held she has been getting fluids and antibiotics; as well as her metoprolol succinate has been on hold as well. She looks hypervolemic on exam. Telemetry shows Afib with slow RVR; patient asymptomatic. She does complain about her extremities being swollen. Lasix and Aldactone have been on hold with fluids given.       Past Cardiology Tests (Last 3 Years):  1/5/2024 TTE 1. Left ventricular systolic function is normal with a 55-60% estimated ejection fraction.   2. The left atrium is severely dilated.   3. Mildly elevated RVSP.   4. Mild aortic valve stenosis.   5. Mild aortic valve regurgitation.    No exacerbating or relieving factors.  Patient denies chest pain and angina.  Pt reports shortness of breath, dyspnea on exertion, orthopnea, and paroxysmal nocturnal dyspnea.  Pt reports worsening lower extremity edema.  Pt denies palpitations or syncope.  No recent falls.  No fever or chills.  No cough.  No change in bowel or bladder habits.  No sick contacts.  No recent travel.     12 point review of systems including " (Constitutional, Eyes, ENMT, Respiratory, Cardiac, Gastrointestinal, Neurological, Psychiatric, and Hematologic) was performed and is otherwise negative.    Past medical history:  As above.    Medications were reviewed.    Allergies were reviewed.    Social history:  Patient denies smoking, alcohol abuse, or illicit drug use.    Family history:  No sudden cardiac death or premature coronary artery disease.     General:  Patient is awake, alert, and oriented.  Patient is in mi;ld respirotry distress with conversation dyspnea present   HEENT:  Pupils equal and reactive.  Normocephalic.  Moist mucosa.    Neck:  No thyromegaly.  JVP is elevated ~ 12 cm with HOB 45 degrees  Cardiovascular:  irregular rate and irregular rhythm   Pulmonary:  Clear to auscultation bilaterally.  Abdomen:  Soft. Non-tender.   Non-distended.  Positive bowel sounds.  Lower Extremities:  2+ pedal pulses. No LE edema.  Neurologic:  Cranial nerves intact.  No focal deficit.   Skin: Skin warm and dry, normal skin turgor.   Psychiatric: Normal affect.    Vital signs, telemetry, medications, labs, and imaging were reviewed as well.    Atrial fibrillation with Slow Ventricular reponse with longest pause being 2.83 seconds occurring at 08:43. This does not meet criteria for device implantation nor does it meet definition for sick sinus syndrome. For now, continue to hold her home beta blocker as you are.   -- Discharge on 14 day event monitor with results to Dr COOKIE King and follow up within 6 weeks for review of monitor.   She does appear to be quite volume up in the setting of supportive IV fluids for diverticulitis while her diuretics were held. We would recommend diuresis with IV Lasix while she remains admitted and certainly would resume her oral diuretics at discharge.     ,Reviewed and approved by JANET MCNAIR on 1/9/24 at 4:30 PM.

## 2024-01-09 NOTE — PROGRESS NOTES
01/09/24 2862   Discharge Planning   Patient expects to be discharged to: home notified by patient son Gabino after further discussion with patient familly patient will now go home with IV abx infusion verse a snf  patient son stated there are 4 nurses in the family, that can asst with patient care, ANNELIESE-CNP notified about change,will place home care orders [facility], script faxed to  pharmacy.

## 2024-01-10 ENCOUNTER — PHARMACY VISIT (OUTPATIENT)
Dept: PHARMACY | Facility: CLINIC | Age: 89
End: 2024-01-10
Payer: COMMERCIAL

## 2024-01-10 ENCOUNTER — HOME HEALTH ADMISSION (OUTPATIENT)
Dept: HOME HEALTH SERVICES | Facility: HOME HEALTH | Age: 89
End: 2024-01-10
Payer: MEDICARE

## 2024-01-10 LAB
ALBUMIN SERPL BCP-MCNC: 2.6 G/DL (ref 3.4–5)
ALP SERPL-CCNC: 65 U/L (ref 33–136)
ALT SERPL W P-5'-P-CCNC: 13 U/L (ref 7–45)
ANION GAP SERPL CALC-SCNC: 10 MMOL/L (ref 10–20)
AST SERPL W P-5'-P-CCNC: 18 U/L (ref 9–39)
ATRIAL RATE: 97 BPM
BACTERIA BLD AEROBE CULT: ABNORMAL
BACTERIA BLD AEROBE CULT: ABNORMAL
BACTERIA BLD CULT: ABNORMAL
BACTERIA BLD CULT: ABNORMAL
BILIRUB SERPL-MCNC: 0.4 MG/DL (ref 0–1.2)
BUN SERPL-MCNC: 14 MG/DL (ref 6–23)
CALCIUM SERPL-MCNC: 9.3 MG/DL (ref 8.6–10.3)
CHLORIDE SERPL-SCNC: 109 MMOL/L (ref 98–107)
CO2 SERPL-SCNC: 24 MMOL/L (ref 21–32)
CREAT SERPL-MCNC: 0.99 MG/DL (ref 0.5–1.05)
EGFRCR SERPLBLD CKD-EPI 2021: 53 ML/MIN/1.73M*2
ERYTHROCYTE [DISTWIDTH] IN BLOOD BY AUTOMATED COUNT: 16.5 % (ref 11.5–14.5)
GLUCOSE BLD MANUAL STRIP-MCNC: 113 MG/DL (ref 74–99)
GLUCOSE BLD MANUAL STRIP-MCNC: 120 MG/DL (ref 74–99)
GLUCOSE BLD MANUAL STRIP-MCNC: 129 MG/DL (ref 74–99)
GLUCOSE BLD MANUAL STRIP-MCNC: 266 MG/DL (ref 74–99)
GLUCOSE SERPL-MCNC: 110 MG/DL (ref 74–99)
GRAM STN SPEC: ABNORMAL
HCT VFR BLD AUTO: 34.5 % (ref 36–46)
HGB BLD-MCNC: 10.7 G/DL (ref 12–16)
MCH RBC QN AUTO: 27.3 PG (ref 26–34)
MCHC RBC AUTO-ENTMCNC: 31 G/DL (ref 32–36)
MCV RBC AUTO: 88 FL (ref 80–100)
NRBC BLD-RTO: 0 /100 WBCS (ref 0–0)
PLATELET # BLD AUTO: 225 X10*3/UL (ref 150–450)
POTASSIUM SERPL-SCNC: 3.8 MMOL/L (ref 3.5–5.3)
PROT SERPL-MCNC: 4.7 G/DL (ref 6.4–8.2)
Q ONSET: 217 MS
QRS COUNT: 9 BEATS
QRS DURATION: 84 MS
QT INTERVAL: 456 MS
QTC CALCULATION(BAZETT): 432 MS
QTC FREDERICIA: 440 MS
R AXIS: 22 DEGREES
RBC # BLD AUTO: 3.92 X10*6/UL (ref 4–5.2)
SODIUM SERPL-SCNC: 139 MMOL/L (ref 136–145)
T AXIS: 44 DEGREES
T OFFSET: 445 MS
VENTRICULAR RATE: 54 BPM
WBC # BLD AUTO: 5.2 X10*3/UL (ref 4.4–11.3)

## 2024-01-10 PROCEDURE — 2500000004 HC RX 250 GENERAL PHARMACY W/ HCPCS (ALT 636 FOR OP/ED)

## 2024-01-10 PROCEDURE — 1100000001 HC PRIVATE ROOM DAILY

## 2024-01-10 PROCEDURE — 2500000001 HC RX 250 WO HCPCS SELF ADMINISTERED DRUGS (ALT 637 FOR MEDICARE OP): Performed by: NURSE PRACTITIONER

## 2024-01-10 PROCEDURE — 80053 COMPREHEN METABOLIC PANEL: CPT

## 2024-01-10 PROCEDURE — 82947 ASSAY GLUCOSE BLOOD QUANT: CPT

## 2024-01-10 PROCEDURE — 99232 SBSQ HOSP IP/OBS MODERATE 35: CPT | Performed by: INTERNAL MEDICINE

## 2024-01-10 PROCEDURE — 85027 COMPLETE CBC AUTOMATED: CPT

## 2024-01-10 PROCEDURE — 9420000001 HC RT PATIENT EDUCATION 5 MIN

## 2024-01-10 PROCEDURE — 36415 COLL VENOUS BLD VENIPUNCTURE: CPT

## 2024-01-10 PROCEDURE — 2500000004 HC RX 250 GENERAL PHARMACY W/ HCPCS (ALT 636 FOR OP/ED): Performed by: NURSE PRACTITIONER

## 2024-01-10 PROCEDURE — 99233 SBSQ HOSP IP/OBS HIGH 50: CPT | Performed by: INTERNAL MEDICINE

## 2024-01-10 PROCEDURE — 97116 GAIT TRAINING THERAPY: CPT | Mod: GP,CQ | Performed by: PHYSICAL THERAPY ASSISTANT

## 2024-01-10 RX ADMIN — METRONIDAZOLE 500 MG: 500 TABLET ORAL at 05:41

## 2024-01-10 RX ADMIN — METRONIDAZOLE 500 MG: 500 TABLET ORAL at 21:34

## 2024-01-10 RX ADMIN — PANTOPRAZOLE SODIUM 40 MG: 40 TABLET, DELAYED RELEASE ORAL at 10:03

## 2024-01-10 RX ADMIN — PREGABALIN 25 MG: 25 CAPSULE ORAL at 10:03

## 2024-01-10 RX ADMIN — FUROSEMIDE 40 MG: 10 INJECTION, SOLUTION INTRAMUSCULAR; INTRAVENOUS at 05:41

## 2024-01-10 RX ADMIN — FUROSEMIDE 40 MG: 10 INJECTION, SOLUTION INTRAMUSCULAR; INTRAVENOUS at 18:07

## 2024-01-10 RX ADMIN — Medication 3 MG: at 21:34

## 2024-01-10 RX ADMIN — CEFEPIME 1 G: 1 INJECTION, POWDER, FOR SOLUTION INTRAMUSCULAR; INTRAVENOUS at 21:35

## 2024-01-10 RX ADMIN — POLYETHYLENE GLYCOL 3350 17 G: 17 POWDER, FOR SOLUTION ORAL at 10:03

## 2024-01-10 RX ADMIN — RIVAROXABAN 15 MG: 15 TABLET, FILM COATED ORAL at 18:07

## 2024-01-10 RX ADMIN — METRONIDAZOLE 500 MG: 500 TABLET ORAL at 15:19

## 2024-01-10 RX ADMIN — ROSUVASTATIN 5 MG: 10 TABLET, FILM COATED ORAL at 21:34

## 2024-01-10 RX ADMIN — PSYLLIUM HUSK 1 PACKET: 3.4 POWDER ORAL at 10:03

## 2024-01-10 RX ADMIN — CEFEPIME 1 G: 1 INJECTION, POWDER, FOR SOLUTION INTRAMUSCULAR; INTRAVENOUS at 10:03

## 2024-01-10 ASSESSMENT — PAIN SCALES - GENERAL
PAINLEVEL_OUTOF10: 0 - NO PAIN
PAINLEVEL_OUTOF10: 3

## 2024-01-10 ASSESSMENT — COGNITIVE AND FUNCTIONAL STATUS - GENERAL
MOVING FROM LYING ON BACK TO SITTING ON SIDE OF FLAT BED WITH BEDRAILS: A LITTLE
TURNING FROM BACK TO SIDE WHILE IN FLAT BAD: A LITTLE
WALKING IN HOSPITAL ROOM: A LITTLE
MOVING TO AND FROM BED TO CHAIR: A LITTLE
MOBILITY SCORE: 16
STANDING UP FROM CHAIR USING ARMS: A LITTLE
CLIMB 3 TO 5 STEPS WITH RAILING: TOTAL

## 2024-01-10 ASSESSMENT — PAIN - FUNCTIONAL ASSESSMENT
PAIN_FUNCTIONAL_ASSESSMENT: 0-10
PAIN_FUNCTIONAL_ASSESSMENT: 0-10

## 2024-01-10 NOTE — PROGRESS NOTES
Follow up with [facility] team for updates for SOC, notified there was no referral sent, referral then resent by ANNELIESE/CNP SOC pending communication with [facility]

## 2024-01-10 NOTE — PROGRESS NOTES
Physical Therapy    Physical Therapy Treatment    Patient Name: Aicha Lord  MRN: [id number]  Today's Date: 1/10/2024  Time Calculation  Start Time: 1403  Stop Time: 1418  Time Calculation (min): 15 min       Assessment/Plan   PT Assessment  End of Session Communication: Bedside nurse  End of Session Patient Position: Bed, 3 rail up  PT Plan  Inpatient/Swing Bed or Outpatient: Inpatient  PT Plan  Treatment/Interventions: Bed mobility, Transfer training, Gait training  PT Plan: Skilled PT  PT Frequency: 3 times per week  PT Discharge Recommendations: Moderate intensity level of continued care  PT - OK to Discharge: Yes (per PT POC)      General Visit Information:   PT  Visit  PT Received On: 01/10/24  General  Reason for Referral: Pt is a 89 yo female presenting [facility] with LLQ pain - pt with recurrent diverticulitis  Patient Position Received: Alarm off, not on at start of session  Preferred Learning Style: auditory, kinesthetic  General Comment:  (pt agreeable to tx)    Subjective   Precautions:     Vital Signs:       Objective   Pain:  Pain Assessment  Pain Assessment: 0-10  Pain Score: 3  Cognition:     Postural Control:     Extremity/Trunk Assessments:    Activity Tolerance:  Activity Tolerance  Endurance: Decreased tolerance for upright activites  Treatments:            Bed Mobility  Bed Mobility: Yes (pt performed supine to sit-sit to supine wiht min A, cues for proper sequencing.)    Ambulation/Gait Training  Ambulation/Gait Training Performed: Yes  Ambulation/Gait Training 1  Surface 1: Level tile  Device 1: Rolling walker  Gait Support Devices: Gait belt  Assistance 1: Contact guard, Minimum assistance  Quality of Gait 1: Forward flexed posture, Antalgic, Narrow base of support  Comments/Distance (ft) 1: 25ftx2 (cues for proper sequencing and FWW placement to improve balance.)  Transfers  Transfer: Yes  Transfer 1  Transfer From 1: Sit to, Stand to  Technique 1: Sit to stand, Stand to sit  Transfer Device  1: Walker, Gait belt  Transfer Level of Assistance 1: Contact guard, Minimal verbal cues  Trials/Comments 1:  (pt req min cues for proper hand placement)    Outcome Measures:  [facility] Basic Mobility  Turning from your back to your side while in a flat bed without using bedrails: A little  Moving from lying on your back to sitting on the side of a flat bed without using bedrails: A little  Moving to and from bed to chair (including a wheelchair): A little  Standing up from a chair using your arms (e.g. wheelchair or bedside chair): A little  To walk in hospital room: A little  Climbing 3-5 steps with railing: Total  Basic Mobility - Total Score: 16    Education Documentation  Home Exercise Program, taught by Rico Henry PTA at 1/10/2024  4:33 PM.  Learner: Patient  Readiness: Acceptance  Method: Explanation  Response: Verbalizes Understanding, Needs Reinforcement    Mobility Training, taught by Rico Henry PTA at 1/10/2024  4:33 PM.  Learner: Patient  Readiness: Acceptance  Method: Explanation  Response: Verbalizes Understanding, Needs Reinforcement    Education Comments  No comments found.        OP EDUCATION:       Encounter Problems       Encounter Problems (Active)       Balance       STG - Maintains dynamic standing balance with upper extremity support (Progressing)       Start:  01/08/24    Expected End:  01/13/24       INTERVENTIONS:  1. Practice standing with minimal support >5 min with no notable SOB  2. Educate patient about standing tolerance.  3. Educate patient about independence with gait, transfers, and ADL's.  4. Educate patient about use of assistive device.  5. Educate patient about self-directed care.            Mobility       LTG - Patient will navigate 1 steps with device (Progressing)       Start:  01/08/24    Expected End:  01/13/24       CGA         STG - Patient will ambulate (Progressing)       Start:  01/08/24    Expected End:  01/13/24       SUP using FWW >80ft             Pain - Adult          Transfers       STG - Patient will perform bed mobility (Progressing)       Start:  01/08/24    Expected End:  01/13/24       Mod Ind         STG - Patient will transfer sit to and from stand (Progressing)       Start:  01/08/24    Expected End:  01/13/24       Mod Ind

## 2024-01-10 NOTE — PROGRESS NOTES
Subjective Data:  Patient seen and examined, chart reviewed  -- No acute issues overnight, she had robust urination following initiation of diuretics  -- Remains in rate controlled atrial fibrillation, no significant pauses.  Longest was 2.42 seconds occurring at 03:32 while she was sleeping         Objective Data:  Last Recorded Vitals:  Vitals:    01/10/24 0436 01/10/24 0700 01/10/24 0936 01/10/24 0940   BP: 150/61 152/53     BP Location:  Right arm     Patient Position: Lying Sitting     Pulse: 60 52     Resp: 17 18     Temp: 36.2 °C (97.1 °F) 36.5 °C (97.7 °F)     TempSrc: Temporal Oral     SpO2: 98% 98% 96% 97%   Weight: 63.2 kg (139 lb 5.3 oz)      Height:           Last Labs:  CBC - 1/10/2024:  5:38 AM  5.2 10.7 225    34.5      CMP - 1/10/2024:  5:38 AM  9.3 4.7 18 --- 0.4   _ 2.6 13 65      PTT - No results in last year.  _   _ _     BNP   Date/Time Value Ref Range Status   02/17/2019 08:41  0 - 99 pg/mL Final     Comment:     .  <100 pg/mL - Heart failure unlikely  100-299 pg/mL - Intermediate probability of acute heart  .               failure exacerbation. Correlate with clinical  .               context and patient history.    >=300 pg/mL - Heart Failure likely. Correlate with clinical  .               context and patient history.  BNP testing is performed using different testing   methodology at [facility] than at other   [facility] hospitals. Direct result comparisons should   only be made within the same method.       HGBA1C   Date/Time Value Ref Range Status   12/20/2023 10:21 AM 6.4 see below % Final   06/19/2023 10:49 AM 6.8 % Final     Comment:          Diagnosis of Diabetes-Adults   Non-Diabetic: < or = 5.6%   Increased risk for developing diabetes: 5.7-6.4%   Diagnostic of diabetes: > or = 6.5%  .       Monitoring of Diabetes                Age (y)     Therapeutic Goal (%)   Adults:          >18           <7.0   Pediatrics:    13-18           <7.5                   7-12            <8.0                   0- 6            7.5-8.5   American Diabetes Association. Diabetes Care 33(S1), Jan 2010.   12/09/2022 10:46 AM 5.8 % Final     Comment:          Diagnosis of Diabetes-Adults   Non-Diabetic: < or = 5.6%   Increased risk for developing diabetes: 5.7-6.4%   Diagnostic of diabetes: > or = 6.5%  .       Monitoring of Diabetes                Age (y)     Therapeutic Goal (%)   Adults:          >18           <7.0   Pediatrics:    13-18           <7.5                   7-12           <8.0                   0- 6            7.5-8.5   American Diabetes Association. Diabetes Care 33(S1), Jan 2010.       LDLCALC   Date/Time Value Ref Range Status   12/20/2023 10:21 AM 89 <=99 mg/dL Final     Comment:                                 Near   Borderline      AGE      Desirable  Optimal    High     High     Very High     0-19 Y     0 - 109     ---    110-129   >/= 130     ----    20-24 Y     0 - 119     ---    120-159   >/= 160     ----      >24 Y     0 -  99   100-129  130-159   160-189     >/=190       VLDL   Date/Time Value Ref Range Status   12/20/2023 10:21 AM 14 0 - 40 mg/dL Final   06/19/2023 10:49 AM 24 0 - 40 mg/dL Final   12/09/2022 10:46 AM 23 0 - 40 mg/dL Final   12/13/2021 10:30 AM 25 0 - 40 mg/dL Final      Last I/O:  I/O last 3 completed shifts:  In: 885 (14 mL/kg) [P.O.:835; IV Piggyback:50]  Out: 1750 (27.7 mL/kg) [Urine:1450 (0.6 mL/kg/hr); Stool:300]  Weight: 63.2 kg     Past Cardiology Tests (Last 3 Years):  EKG:  ECG 12 lead 01/09/2024      ECG 12 lead 01/01/2024    Echo:  Transthoracic Echo (TTE) Complete 01/05/2024 1/5/2024 TTE 1. Left ventricular systolic function is normal with a 55-60% estimated ejection fraction.   2. The left atrium is severely dilated.   3. Mildly elevated RVSP.   4. Mild aortic valve stenosis.   5. Mild aortic valve regurgitation.  Ejection Fractions:  EF   Date/Time Value Ref Range Status   01/05/2024 02:50 PM 56       Cath:  No results found for this or any  "previous visit from the past 1095 days.    Stress Test:  No results found for this or any previous visit from the past 1095 days.    Cardiac Imaging:  No results found for this or any previous visit from the past 1095 days.      Inpatient Medications:  Scheduled medications   Medication Dose Route Frequency    cefepime  1 g intravenous q12h    furosemide  40 mg intravenous q12h    [Held by provider] furosemide  40 mg oral BID with meals    melatonin  3 mg oral Daily    metroNIDAZOLE  500 mg oral q8h CALVIN    pantoprazole  40 mg oral Daily before breakfast    Or    pantoprazole  40 mg intravenous Daily before breakfast    polyethylene glycol  17 g oral Daily    pregabalin  25 mg oral Daily    psyllium  1 packet oral Daily    rivaroxaban  15 mg oral Daily with evening meal    rosuvastatin  5 mg oral Nightly    [Held by provider] spironolactone  25 mg oral Daily     PRN medications   Medication    acetaminophen    Or    acetaminophen    Or    acetaminophen    albuterol    dextrose 10 % in water (D10W)    dextrose    diphenhydrAMINE    glucagon    guaiFENesin    lidocaine-epinephrine    ondansetron    Or    ondansetron    oxygen     Continuous Medications   Medication Dose Last Rate       Physical Exam:  /53 (BP Location: Right arm, Patient Position: Sitting)   Pulse 52   Temp 36.5 °C (97.7 °F) (Oral)   Resp 18   Ht 1.49 m (4' 10.66\")   Wt 63.2 kg (139 lb 5.3 oz)   SpO2 97%   BMI 28.47 kg/m²     Intake/Output Summary (Last 24 hours) at 1/10/2024 1121  Last data filed at 1/10/2024 0436  Gross per 24 hour   Intake 475 ml   Output 1250 ml   Net -775 ml     Net IO Since Admission: 4,297.5 mL [01/10/24 1121]      Constitutional:       Appearance: Frail.   Neck:      Thyroid: Thyroid normal.      Vascular: JVD elevated.   Pulmonary:      Breath sounds: Scattered Wheezing present. Bibasilar Rales present.   Chest:      Chest wall: Not tender to palpatation.   Cardiovascular:      Bradycardia present. Irregularly " "irregular rhythm.      Murmurs: There is no murmur.      No rub.   Pulses:     Intact distal pulses.   Edema:     Peripheral edema present.     Pretibial: bilateral 1+ edema of the pretibial area.     Ankle: bilateral 1+ edema of the ankle.     Feet: bilateral 1+ edema of the feet.  Abdominal:      General: Bowel sounds are normal.   Musculoskeletal: Normal range of motion. Skin:     General: Skin is warm and dry.   Neurological:      General: No focal deficit present.      Mental Status: Alert and oriented to person, place and time.         Assessment/Plan   Aicha Lord is a 89 y.o. female presenting with history of longstanding persistent atrial fibrillation on Xarelto, type II diabetic and CKD presented to [facility] ED on January 1 with abdominal pain status post diverticulitis and bacteremia 2/2 Ecoli.  Patient was started on IV antibiotics status post PICC line today.  Cardiology was consulted for \"A-fib with slow ventricular response and pauses noted on telemetry\" of note patient has a long history of persistent atrial fibrillation where she follows with outpatient cardiology last office visit was in October 2023.     Atrial fibrillation with Slow Ventricular reponse with longest pause being 2.83 seconds occurring at 08:43. This does not meet criteria for device implantation nor does it meet definition for sick sinus syndrome. For now, continue to hold her home beta blocker as you are.   -- Discharge on 14 day event monitor with results to Dr COOKIE King and follow up within 6 weeks for review of monitor.       -- She does appear to be quite volume up ( About 4L )  in the setting of supportive IV fluids for diverticulitis while her diuretics were held. We would recommend diuresis with IV Lasix while she remains admitted and certainly would resume her oral diuretics at discharge.       PICC - Adult 01/09/24 Single lumen Right (Active)   Line Necessity Intravenous medication therapy 01/09/24 2000   Site Assessment " Clean;Dry;Intact 01/09/24 2000   Proximal Lumen Status Blood return noted;Flushed;Normal saline locked 01/09/24 2000   Dressing Type Transparent 01/09/24 2000   Dressing Status Clean;Dry 01/09/24 2000   Dressing Change Due 01/15/24 01/09/24 2000   Number of days: 1       Peripheral IV 01/08/24 20 G 1.88 cm Left;Upper Arm (Active)   Site Assessment Clean;Dry;Intact 01/10/24 0000   Dressing Status Clean;Dry 01/10/24 0000   Number of days: 2       Code Status:  Full Code    I spent 40 minutes in the professional and overall care of this patient.        Collin Patel, DO

## 2024-01-10 NOTE — PROGRESS NOTES
Aicha Lord is a 89 y.o. female     S/p PICC line  Noted to have pauses on telemetry  Beta-blockers held    Fluid overloaded on exam  Review of Systems     Constitutional: no fever, no chills, not feeling poorly, not feeling tired   Cardiovascular: no chest pain   Respiratory: no cough, wheezing or shortness of breath a  Gastrointestinal:   Neurological: no headache,   All other systems have been reviewed and are negative for complaint.       Vitals:    01/10/24 1100   BP: (!) 161/41   Pulse: 61   Resp: 18   Temp: 37.5 °C (99.5 °F)   SpO2: 97%        Scheduled medications  cefepime, 1 g, intravenous, q12h  furosemide, 40 mg, intravenous, q12h  [Held by provider] furosemide, 40 mg, oral, BID with meals  melatonin, 3 mg, oral, Daily  metroNIDAZOLE, 500 mg, oral, q8h CALVIN  pantoprazole, 40 mg, oral, Daily before breakfast   Or  pantoprazole, 40 mg, intravenous, Daily before breakfast  polyethylene glycol, 17 g, oral, Daily  pregabalin, 25 mg, oral, Daily  psyllium, 1 packet, oral, Daily  rivaroxaban, 15 mg, oral, Daily with evening meal  rosuvastatin, 5 mg, oral, Nightly  [Held by provider] spironolactone, 25 mg, oral, Daily      Continuous medications       PRN medications  PRN medications: acetaminophen **OR** acetaminophen **OR** acetaminophen, albuterol, dextrose 10 % in water (D10W), dextrose, diphenhydrAMINE, glucagon, guaiFENesin, lidocaine-epinephrine, ondansetron **OR** ondansetron, oxygen    Lab Review   Results from last 7 days   Lab Units 01/10/24  0538 01/09/24  0625 01/08/24  0633   WBC AUTO x10*3/uL 5.2 5.4 5.1   HEMOGLOBIN g/dL 10.7* 11.3* 10.5*   HEMATOCRIT % 34.5* 37.6 34.6*   PLATELETS AUTO x10*3/uL 225 232 191       Results from last 7 days   Lab Units 01/10/24  0538 01/09/24  0625 01/08/24  0633   SODIUM mmol/L 139 136 135*   POTASSIUM mmol/L 3.8 4.0 3.7   CHLORIDE mmol/L 109* 110* 112*   CO2 mmol/L 24 19* 15*   BUN mg/dL 14 12 14   CREATININE mg/dL 0.99 0.84 0.96   CALCIUM mg/dL 9.3 9.4 8.6    PROTEIN TOTAL g/dL 4.7*  --   --    BILIRUBIN TOTAL mg/dL 0.4  --   --    ALK PHOS U/L 65  --   --    ALT U/L 13  --   --    AST U/L 18  --   --    GLUCOSE mg/dL 110* 117* 120*              IR CVC tunneled   Final Result   Ultrasound and fluoroscopically guided right internal jugular vein   tunneled small bore central venous catheter. The catheter is ready   for immediate use.             Signed by: Sravan Moss 1/9/2024 10:51 AM   Dictation workstation:   [id number]      XR chest 1 view   Final Result   Enlarged cardiac silhouette. Question of faint parenchymal   infiltration.        MACRO:   none        Signed by: Rasheeda Healy 1/7/2024 10:09 AM   Dictation workstation:   [id number]      Transthoracic Echo (TTE) Complete   Final Result      CT abdomen pelvis w IV contrast   Final Result   1.  Diffuse diverticulosis with focal thickening of the inferior wall   of the mid sigmoid: And associated small amount of ascitic fluid in   the pelvis consistent with sigmoid diverticulitis. This is similar in   location compared to 2016.  no evidence of free air or fluid   collection.   2. Moderate-sized hiatal hernia filled with fluid suggestive of   reflux esophagitis.   3. Osteopenia with age indeterminate compression deformity of the   inferior endplate of L1 new from 2016. Correlation with point   tenderness is recommended.   4. Cholelithiasis without cholecystitis. Right greater than left   renal atrophy similar to prior.             Signed by: Saleem Gill 1/1/2024 11:48 PM   Dictation workstation:   [id number]      Holter Or Event Cardiac Monitor    (Results Pending)         Physical Exam     Constitutional   General appearance: Alert and in no acute distress.     Pulmonary   Respiratory assessment: No respiratory distress, normal respiratory rhythm and effort.    Auscultation of Lungs: Clear bilateral breath sounds.   Cardiovascular   Auscultation of heart: Apical pulse normal, heart rate and rhythm  normal, normal S1 and S2, no murmurs and no pericardial rub.    Exam for edema: No peripheral edema.   Abdomen   Abdominal Exam: Left lower quadrant tenderness  Liver and Spleen exam: No hepato-splenomegaly.   Musculoskeletal   Examination of gait: ROM intact  Skin inspection: Normal skin color and pigmentation, normal skin turgor and no visible rash.   Neurologic   Cranial nerves: Nerves 2-12 were intact, no focal neuro defects.     Assessment/Plan      #Acute recurrent diverticulitis  #Bacteremia  Status post PICC line  TTE is okay  IV cefepime 1 g every 12 through 1/8/2024  P.o. Flagyl 3 times daily through 1/18/2024     #Atrial fibrillation  Continue Xarelto  Holding beta-blocker  Will discharge with event monitor because of pauses noted on telemetry     #Dyslipidemia  #Diabetes mellitus  Sliding-scale insulin coverage    # Acute CHF  Resume diuresis  Stop fluids     #Hyponatremia  #Chronic kidney disease stage IIIb  Stable

## 2024-01-10 NOTE — CARE PLAN
Problem: Pain - Adult  Goal: Verbalizes/displays adequate comfort level or baseline comfort level  Outcome: Progressing     Problem: Safety - Adult  Goal: Free from fall injury  Outcome: Progressing     Problem: Chronic Conditions and Co-morbidities  Goal: Patient's chronic conditions and co-morbidity symptoms are monitored and maintained or improved  Outcome: Progressing     Problem: Fall/Injury  Goal: Not fall by end of shift  Outcome: Progressing     The patient's goals for the shift include  maintain safety     The clinical goals for the shift include maintain safety

## 2024-01-11 ENCOUNTER — HOME INFUSION (OUTPATIENT)
Dept: INFUSION THERAPY | Age: 89
End: 2024-01-11
Payer: MEDICARE

## 2024-01-11 ENCOUNTER — DOCUMENTATION (OUTPATIENT)
Dept: PHARMACY | Facility: CLINIC | Age: 89
End: 2024-01-11

## 2024-01-11 VITALS
HEIGHT: 59 IN | HEART RATE: 53 BPM | WEIGHT: 139.33 LBS | BODY MASS INDEX: 28.09 KG/M2 | RESPIRATION RATE: 18 BRPM | DIASTOLIC BLOOD PRESSURE: 64 MMHG | OXYGEN SATURATION: 97 % | SYSTOLIC BLOOD PRESSURE: 158 MMHG | TEMPERATURE: 97.6 F

## 2024-01-11 LAB
ALBUMIN SERPL BCP-MCNC: 2.7 G/DL (ref 3.4–5)
ALP SERPL-CCNC: 60 U/L (ref 33–136)
ALT SERPL W P-5'-P-CCNC: 12 U/L (ref 7–45)
ANION GAP SERPL CALC-SCNC: 8 MMOL/L (ref 10–20)
AST SERPL W P-5'-P-CCNC: 14 U/L (ref 9–39)
BILIRUB SERPL-MCNC: 0.4 MG/DL (ref 0–1.2)
BNP SERPL-MCNC: 334 PG/ML (ref 0–99)
BUN SERPL-MCNC: 16 MG/DL (ref 6–23)
CALCIUM SERPL-MCNC: 9.3 MG/DL (ref 8.6–10.3)
CHLORIDE SERPL-SCNC: 108 MMOL/L (ref 98–107)
CO2 SERPL-SCNC: 27 MMOL/L (ref 21–32)
CREAT SERPL-MCNC: 0.93 MG/DL (ref 0.5–1.05)
EGFRCR SERPLBLD CKD-EPI 2021: 57 ML/MIN/1.73M*2
ERYTHROCYTE [DISTWIDTH] IN BLOOD BY AUTOMATED COUNT: 16.6 % (ref 11.5–14.5)
GLUCOSE BLD MANUAL STRIP-MCNC: 153 MG/DL (ref 74–99)
GLUCOSE BLD MANUAL STRIP-MCNC: 160 MG/DL (ref 74–99)
GLUCOSE BLD MANUAL STRIP-MCNC: 169 MG/DL (ref 74–99)
GLUCOSE BLD MANUAL STRIP-MCNC: 243 MG/DL (ref 74–99)
GLUCOSE SERPL-MCNC: 141 MG/DL (ref 74–99)
HCT VFR BLD AUTO: 33.2 % (ref 36–46)
HGB BLD-MCNC: 10.4 G/DL (ref 12–16)
MCH RBC QN AUTO: 26.8 PG (ref 26–34)
MCHC RBC AUTO-ENTMCNC: 31.3 G/DL (ref 32–36)
MCV RBC AUTO: 86 FL (ref 80–100)
NRBC BLD-RTO: 0 /100 WBCS (ref 0–0)
PLATELET # BLD AUTO: 231 X10*3/UL (ref 150–450)
POTASSIUM SERPL-SCNC: 3.3 MMOL/L (ref 3.5–5.3)
PROT SERPL-MCNC: 4.8 G/DL (ref 6.4–8.2)
RBC # BLD AUTO: 3.88 X10*6/UL (ref 4–5.2)
SODIUM SERPL-SCNC: 140 MMOL/L (ref 136–145)
WBC # BLD AUTO: 5.2 X10*3/UL (ref 4.4–11.3)

## 2024-01-11 PROCEDURE — 99232 SBSQ HOSP IP/OBS MODERATE 35: CPT | Performed by: INTERNAL MEDICINE

## 2024-01-11 PROCEDURE — 82947 ASSAY GLUCOSE BLOOD QUANT: CPT

## 2024-01-11 PROCEDURE — 2500000001 HC RX 250 WO HCPCS SELF ADMINISTERED DRUGS (ALT 637 FOR MEDICARE OP): Performed by: NURSE PRACTITIONER

## 2024-01-11 PROCEDURE — 83880 ASSAY OF NATRIURETIC PEPTIDE: CPT | Performed by: INTERNAL MEDICINE

## 2024-01-11 PROCEDURE — 2500000004 HC RX 250 GENERAL PHARMACY W/ HCPCS (ALT 636 FOR OP/ED)

## 2024-01-11 PROCEDURE — 2500000004 HC RX 250 GENERAL PHARMACY W/ HCPCS (ALT 636 FOR OP/ED): Performed by: INTERNAL MEDICINE

## 2024-01-11 PROCEDURE — 99239 HOSP IP/OBS DSCHRG MGMT >30: CPT | Performed by: INTERNAL MEDICINE

## 2024-01-11 PROCEDURE — 2500000004 HC RX 250 GENERAL PHARMACY W/ HCPCS (ALT 636 FOR OP/ED): Performed by: NURSE PRACTITIONER

## 2024-01-11 PROCEDURE — 85027 COMPLETE CBC AUTOMATED: CPT

## 2024-01-11 PROCEDURE — 80053 COMPREHEN METABOLIC PANEL: CPT

## 2024-01-11 RX ORDER — POTASSIUM CHLORIDE 20 MEQ/1
20 TABLET, EXTENDED RELEASE ORAL ONCE
Status: COMPLETED | OUTPATIENT
Start: 2024-01-11 | End: 2024-01-11

## 2024-01-11 RX ADMIN — FUROSEMIDE 40 MG: 10 INJECTION, SOLUTION INTRAMUSCULAR; INTRAVENOUS at 17:29

## 2024-01-11 RX ADMIN — RIVAROXABAN 15 MG: 15 TABLET, FILM COATED ORAL at 18:26

## 2024-01-11 RX ADMIN — PREGABALIN 25 MG: 25 CAPSULE ORAL at 09:11

## 2024-01-11 RX ADMIN — METRONIDAZOLE 500 MG: 500 TABLET ORAL at 06:02

## 2024-01-11 RX ADMIN — METRONIDAZOLE 500 MG: 500 TABLET ORAL at 22:00

## 2024-01-11 RX ADMIN — ROSUVASTATIN 5 MG: 10 TABLET, FILM COATED ORAL at 20:25

## 2024-01-11 RX ADMIN — Medication 3 MG: at 18:26

## 2024-01-11 RX ADMIN — METRONIDAZOLE 500 MG: 500 TABLET ORAL at 14:33

## 2024-01-11 RX ADMIN — POTASSIUM CHLORIDE 20 MEQ: 1500 TABLET, EXTENDED RELEASE ORAL at 09:11

## 2024-01-11 RX ADMIN — CEFEPIME 1 G: 1 INJECTION, POWDER, FOR SOLUTION INTRAMUSCULAR; INTRAVENOUS at 11:07

## 2024-01-11 RX ADMIN — CEFEPIME 1 G: 1 INJECTION, POWDER, FOR SOLUTION INTRAMUSCULAR; INTRAVENOUS at 20:25

## 2024-01-11 RX ADMIN — FUROSEMIDE 40 MG: 10 INJECTION, SOLUTION INTRAMUSCULAR; INTRAVENOUS at 06:03

## 2024-01-11 RX ADMIN — ACETAMINOPHEN 650 MG: 325 TABLET ORAL at 21:11

## 2024-01-11 RX ADMIN — PANTOPRAZOLE SODIUM 40 MG: 40 TABLET, DELAYED RELEASE ORAL at 06:02

## 2024-01-11 ASSESSMENT — COGNITIVE AND FUNCTIONAL STATUS - GENERAL
MOVING FROM LYING ON BACK TO SITTING ON SIDE OF FLAT BED WITH BEDRAILS: A LITTLE
TURNING FROM BACK TO SIDE WHILE IN FLAT BAD: A LITTLE
DAILY ACTIVITIY SCORE: 24
MOBILITY SCORE: 21
MOVING TO AND FROM BED TO CHAIR: A LITTLE

## 2024-01-11 ASSESSMENT — PAIN SCALES - GENERAL
PAINLEVEL_OUTOF10: 0 - NO PAIN
PAINLEVEL_OUTOF10: 3

## 2024-01-11 ASSESSMENT — PAIN - FUNCTIONAL ASSESSMENT
PAIN_FUNCTIONAL_ASSESSMENT: 0-10
PAIN_FUNCTIONAL_ASSESSMENT: 0-10

## 2024-01-11 NOTE — PROGRESS NOTES
Aicha Lord is a 89 y.o. female     S/p PICC line  Noted to have pauses on telemetry  Beta-blockers held    Fluid overloaded on exam  Review of Systems     Constitutional: no fever, no chills, not feeling poorly, not feeling tired   Cardiovascular: no chest pain   Respiratory: no cough, wheezing or shortness of breath a  Gastrointestinal:   Neurological: no headache,   All other systems have been reviewed and are negative for complaint.       Vitals:    01/11/24 1600   BP: 175/69   Pulse: 56   Resp: 18   Temp: 36.4 °C (97.6 °F)   SpO2: 96%        Scheduled medications  cefepime, 1 g, intravenous, q12h  furosemide, 40 mg, intravenous, q12h  [Held by provider] furosemide, 40 mg, oral, BID with meals  melatonin, 3 mg, oral, Daily  metroNIDAZOLE, 500 mg, oral, q8h CALVIN  pantoprazole, 40 mg, oral, Daily before breakfast   Or  pantoprazole, 40 mg, intravenous, Daily before breakfast  polyethylene glycol, 17 g, oral, Daily  pregabalin, 25 mg, oral, Daily  psyllium, 1 packet, oral, Daily  rivaroxaban, 15 mg, oral, Daily with evening meal  rosuvastatin, 5 mg, oral, Nightly  [Held by provider] spironolactone, 25 mg, oral, Daily      Continuous medications       PRN medications  PRN medications: acetaminophen **OR** acetaminophen **OR** acetaminophen, albuterol, dextrose 10 % in water (D10W), dextrose, diphenhydrAMINE, glucagon, guaiFENesin, lidocaine-epinephrine, ondansetron **OR** ondansetron, oxygen    Lab Review   Results from last 7 days   Lab Units 01/11/24  0611 01/10/24  0538 01/09/24  0625   WBC AUTO x10*3/uL 5.2 5.2 5.4   HEMOGLOBIN g/dL 10.4* 10.7* 11.3*   HEMATOCRIT % 33.2* 34.5* 37.6   PLATELETS AUTO x10*3/uL 231 225 232       Results from last 7 days   Lab Units 01/11/24  0611 01/10/24  0538 01/09/24  0625   SODIUM mmol/L 140 139 136   POTASSIUM mmol/L 3.3* 3.8 4.0   CHLORIDE mmol/L 108* 109* 110*   CO2 mmol/L 27 24 19*   BUN mg/dL 16 14 12   CREATININE mg/dL 0.93 0.99 0.84   CALCIUM mg/dL 9.3 9.3 9.4    PROTEIN TOTAL g/dL 4.8* 4.7*  --    BILIRUBIN TOTAL mg/dL 0.4 0.4  --    ALK PHOS U/L 60 65  --    ALT U/L 12 13  --    AST U/L 14 18  --    GLUCOSE mg/dL 141* 110* 117*              IR CVC tunneled   Final Result   Ultrasound and fluoroscopically guided right internal jugular vein   tunneled small bore central venous catheter. The catheter is ready   for immediate use.             Signed by: Sravan Moss 1/9/2024 10:51 AM   Dictation workstation:   [id number]      XR chest 1 view   Final Result   Enlarged cardiac silhouette. Question of faint parenchymal   infiltration.        MACRO:   none        Signed by: Rasheeda Healy 1/7/2024 10:09 AM   Dictation workstation:   [id number]      Transthoracic Echo (TTE) Complete   Final Result      CT abdomen pelvis w IV contrast   Final Result   1.  Diffuse diverticulosis with focal thickening of the inferior wall   of the mid sigmoid: And associated small amount of ascitic fluid in   the pelvis consistent with sigmoid diverticulitis. This is similar in   location compared to 2016.  no evidence of free air or fluid   collection.   2. Moderate-sized hiatal hernia filled with fluid suggestive of   reflux esophagitis.   3. Osteopenia with age indeterminate compression deformity of the   inferior endplate of L1 new from 2016. Correlation with point   tenderness is recommended.   4. Cholelithiasis without cholecystitis. Right greater than left   renal atrophy similar to prior.             Signed by: Saleem Gill 1/1/2024 11:48 PM   Dictation workstation:   [id number]      Holter Or Event Cardiac Monitor    (Results Pending)   Holter Or Event Cardiac Monitor    (Results Pending)         Physical Exam     Constitutional   General appearance: Alert and in no acute distress.     Pulmonary   Respiratory assessment: No respiratory distress, normal respiratory rhythm and effort.    Auscultation of Lungs: Clear bilateral breath sounds.   Cardiovascular   Auscultation of heart:  Apical pulse normal, heart rate and rhythm normal, normal S1 and S2, no murmurs and no pericardial rub.    Exam for edema: No peripheral edema.   Abdomen   Abdominal Exam: Left lower quadrant tenderness  Liver and Spleen exam: No hepato-splenomegaly.   Musculoskeletal   Examination of gait: ROM intact  Skin inspection: Normal skin color and pigmentation, normal skin turgor and no visible rash.   Neurologic   Cranial nerves: Nerves 2-12 were intact, no focal neuro defects.     Assessment/Plan      #Acute recurrent diverticulitis  #Bacteremia  Status post PICC line  TTE is okay  IV cefepime 1 g every 12 through 1/8/2024  P.o. Flagyl 3 times daily through 1/18/2024     #Atrial fibrillation  Continue Xarelto  Holding beta-blocker  Will discharge with event monitor because of pauses noted on telemetry     #Dyslipidemia  #Diabetes mellitus  Sliding-scale insulin coverage    # Acute CHF  Resume diuresis  Stop fluids     #Hyponatremia  #Chronic kidney disease stage IIIb  Stable

## 2024-01-11 NOTE — PROGRESS NOTES
SPOKE W/ PT - DELIVERY IS SCHEDULED FOR TODAY BY 9 PM.  ASKED PT IF THERE ARE ANY QUESTIONS TO A PHARMACIST. PT SAID: NO QUESTIONS.   address confirmed

## 2024-01-11 NOTE — PROGRESS NOTES
Subjective Data:  Ann Marie seen and examined, chart reviewed.  -- No complaints, planned for discharge home with [facility] later  -- Cardiac telemetry reviewed -- Atrial Fibrillation with Slow VR. No Pauses in Excess of 3 seconds            Objective Data:  Last Recorded Vitals:  Vitals:    01/10/24 1919 01/11/24 0307 01/11/24 0800 01/11/24 1136   BP: 166/51 151/60 169/58 158/67   BP Location: Left arm  Left arm Left arm   Patient Position: Sitting  Lying Lying   Pulse: 58 55 (!) 47 54   Resp: 18 18 18 18   Temp: 36.6 °C (97.9 °F) 36.4 °C (97.5 °F) 36.4 °C (97.5 °F) 36.4 °C (97.6 °F)   TempSrc: Temporal Oral Temporal Temporal   SpO2: 97% 95% 97% 96%   Weight:       Height:           Last Labs:  Results from last 7 days   Lab Units 01/11/24  0611 01/10/24  0538 01/09/24  0625   WBC AUTO x10*3/uL 5.2 5.2 5.4   HEMOGLOBIN g/dL 10.4* 10.7* 11.3*   HEMATOCRIT % 33.2* 34.5* 37.6   PLATELETS AUTO x10*3/uL 231 225 232     Results from last 7 days   Lab Units 01/11/24  0611 01/10/24  0538 01/09/24  0625   SODIUM mmol/L 140 139 136   POTASSIUM mmol/L 3.3* 3.8 4.0   CHLORIDE mmol/L 108* 109* 110*   CO2 mmol/L 27 24 19*   BUN mg/dL 16 14 12   CREATININE mg/dL 0.93 0.99 0.84   CALCIUM mg/dL 9.3 9.3 9.4   PROTEIN TOTAL g/dL 4.8* 4.7*  --    BILIRUBIN TOTAL mg/dL 0.4 0.4  --    ALK PHOS U/L 60 65  --    ALT U/L 12 13  --    AST U/L 14 18  --    GLUCOSE mg/dL 141* 110* 117*             BNP   Date/Time Value Ref Range Status   02/17/2019 08:41  0 - 99 pg/mL Final     Comment:     .  <100 pg/mL - Heart failure unlikely  100-299 pg/mL - Intermediate probability of acute heart  .               failure exacerbation. Correlate with clinical  .               context and patient history.    >=300 pg/mL - Heart Failure likely. Correlate with clinical  .               context and patient history.  BNP testing is performed using different testing   methodology at [facility] than at other   [facility]. Direct result  comparisons should   only be made within the same method.       HGBA1C   Date/Time Value Ref Range Status   12/20/2023 10:21 AM 6.4 see below % Final   06/19/2023 10:49 AM 6.8 % Final     Comment:          Diagnosis of Diabetes-Adults   Non-Diabetic: < or = 5.6%   Increased risk for developing diabetes: 5.7-6.4%   Diagnostic of diabetes: > or = 6.5%  .       Monitoring of Diabetes                Age (y)     Therapeutic Goal (%)   Adults:          >18           <7.0   Pediatrics:    13-18           <7.5                   7-12           <8.0                   0- 6            7.5-8.5   American Diabetes Association. Diabetes Care 33(S1), Jan 2010.   12/09/2022 10:46 AM 5.8 % Final     Comment:          Diagnosis of Diabetes-Adults   Non-Diabetic: < or = 5.6%   Increased risk for developing diabetes: 5.7-6.4%   Diagnostic of diabetes: > or = 6.5%  .       Monitoring of Diabetes                Age (y)     Therapeutic Goal (%)   Adults:          >18           <7.0   Pediatrics:    13-18           <7.5                   7-12           <8.0                   0- 6            7.5-8.5   American Diabetes Association. Diabetes Care 33(S1), Jan 2010.       LDLCALC   Date/Time Value Ref Range Status   12/20/2023 10:21 AM 89 <=99 mg/dL Final     Comment:                                 Near   Borderline      AGE      Desirable  Optimal    High     High     Very High     0-19 Y     0 - 109     ---    110-129   >/= 130     ----    20-24 Y     0 - 119     ---    120-159   >/= 160     ----      >24 Y     0 -  99   100-129  130-159   160-189     >/=190       VLDL   Date/Time Value Ref Range Status   12/20/2023 10:21 AM 14 0 - 40 mg/dL Final   06/19/2023 10:49 AM 24 0 - 40 mg/dL Final   12/09/2022 10:46 AM 23 0 - 40 mg/dL Final   12/13/2021 10:30 AM 25 0 - 40 mg/dL Final      Last I/O:  I/O last 3 completed shifts:  In: - (0 mL/kg)   Out: 1100 (17.4 mL/kg) [Urine:1100 (0.5 mL/kg/hr)]  Weight: 63.2 kg       Intake/Output Summary (Last 24  hours) at 1/11/2024 1544  Last data filed at 1/10/2024 2135  Gross per 24 hour   Intake --   Output 600 ml   Net -600 ml       Net IO Since Admission: 3,697.5 mL [01/11/24 1544]        Past Cardiology Tests (Last 3 Years):  EKG:  ECG 12 lead 01/09/2024      ECG 12 lead 01/01/2024    Echo:  Transthoracic Echo (TTE) Complete 01/05/2024 1/5/2024 TTE 1. Left ventricular systolic function is normal with a 55-60% estimated ejection fraction.   2. The left atrium is severely dilated.   3. Mildly elevated RVSP.   4. Mild aortic valve stenosis.   5. Mild aortic valve regurgitation.  Ejection Fractions:  EF   Date/Time Value Ref Range Status   01/05/2024 02:50 PM 56       Cath:  No results found for this or any previous visit from the past 1095 days.    Stress Test:  No results found for this or any previous visit from the past 1095 days.    Cardiac Imaging:  No results found for this or any previous visit from the past 1095 days.      Inpatient Medications:  Scheduled medications   Medication Dose Route Frequency    cefepime  1 g intravenous q12h    furosemide  40 mg intravenous q12h    [Held by provider] furosemide  40 mg oral BID with meals    melatonin  3 mg oral Daily    metroNIDAZOLE  500 mg oral q8h CALVIN    pantoprazole  40 mg oral Daily before breakfast    Or    pantoprazole  40 mg intravenous Daily before breakfast    polyethylene glycol  17 g oral Daily    pregabalin  25 mg oral Daily    psyllium  1 packet oral Daily    rivaroxaban  15 mg oral Daily with evening meal    rosuvastatin  5 mg oral Nightly    [Held by provider] spironolactone  25 mg oral Daily     PRN medications   Medication    acetaminophen    Or    acetaminophen    Or    acetaminophen    albuterol    dextrose 10 % in water (D10W)    dextrose    diphenhydrAMINE    glucagon    guaiFENesin    lidocaine-epinephrine    ondansetron    Or    ondansetron    oxygen     Continuous Medications   Medication Dose Last Rate       Physical Exam:  /67 (BP  "Location: Left arm, Patient Position: Lying)   Pulse 54   Temp 36.4 °C (97.6 °F) (Temporal)   Resp 18   Ht 1.49 m (4' 10.66\")   Wt 63.2 kg (139 lb 5.3 oz)   SpO2 96%   BMI 28.47 kg/m²     Intake/Output Summary (Last 24 hours) at 1/11/2024 1445  Last data filed at 1/10/2024 2135  Gross per 24 hour   Intake --   Output 600 ml   Net -600 ml       Net IO Since Admission: 3,697.5 mL [01/11/24 1445]      Constitutional:       Appearance: Frail.   Neck:      Thyroid: Thyroid normal.      Vascular: JVD elevated.   Pulmonary:      Breath sounds: Scattered Wheezing present. Bibasilar Rales present.   Chest:      Chest wall: Not tender to palpatation.   Cardiovascular:      Bradycardia present. Irregularly irregular rhythm.      Murmurs: There is no murmur.      No rub.   Pulses:     Intact distal pulses.   Edema:     Peripheral edema present.     Pretibial: bilateral 1+ edema of the pretibial area.     Ankle: bilateral 1+ edema of the ankle.     Feet: bilateral 1+ edema of the feet.  Abdominal:      General: Bowel sounds are normal.   Musculoskeletal: Normal range of motion. Skin:     General: Skin is warm and dry.   Neurological:      General: No focal deficit present.      Mental Status: Alert and oriented to person, place and time.         Assessment/Plan   Aicha Lord is a 89 y.o. female presenting with history of longstanding persistent atrial fibrillation on Xarelto, type II diabetic and CKD presented to [facility] ED on January 1 with abdominal pain status post diverticulitis and bacteremia 2/2 Ecoli.  Patient was started on IV antibiotics status post PICC line today.  Cardiology was consulted for \"A-fib with slow ventricular response and pauses noted on telemetry\" of note patient has a long history of persistent atrial fibrillation where she follows with outpatient cardiology last office visit was in October 2023.     Atrial fibrillation with Slow Ventricular reponse with longest pause being 2.83 seconds " occurring at 08:43. This does not meet criteria for device implantation nor does it meet definition for sick sinus syndrome. For now, continue to hold her home beta blocker as you are.   -- Discharge on 14 day event monitor with results to Dr COOKIE King and follow up within 6 weeks for review of monitor.   -- She does appear to be quite volume up ( About 2.5L )  in the setting of supportive IV fluids for diverticulitis while her diuretics were held. We would recommend diuresis with IV Lasix while she remains admitted and certainly would resume her oral diuretics at discharge.   -- Resume home medications at time of Discharge.           Code Status:  Full Code    I spent 40 minutes in the professional and overall care of this patient.        Reviewed and approved by JANET MCNAIR on 1/11/24 at 3:45 PM.

## 2024-01-11 NOTE — PROGRESS NOTES
Care Coordinator Note:  Plan: IV  ABX delivery scheduled tonight, patient family member provided with informational infusion card, for further information to follow up with home health care  Disposition: home with [facility] infusion SOC 1/12/2024  ADOD today    Glenny RYDER, RN TCC

## 2024-01-11 NOTE — PROGRESS NOTES
REVIEWED PT INFO AS CORRECT  DX - bacteremia   ALLERGIES - celecoxib, cipro, penicillins, promethazine, statins, bactrim, and tramadol  NO MEDICATION INTERACTIONS  REVIEWED RELEVANT BASELINE LAB VALUES  LABS ARE - 1/15 CBC w diff, and creatinine to Dr Alcala [phone]  PT HAS SL PICC TO FLUSH PER [facility] PROTOCOL  MED AND MED SYSTEM -cefepime 1 gm q12h IVP thru tentative stop date 1/18  CARE PLAN DONE TODAY    PATIENT IS A 89 YO PATIENT  DISCHARGED FROM HOSPITAL TO [facility] FOR SKILLED NURSING AND  PHARMACY SERVICES.       PT ORDERED  cefepime 1 gm q12h  SOC 1/12 MED ORDER VERIFIED AS ACCURATE AND APPROPRIATE FOR THERAPY.   DR Alcala   FOLLOWING     Pharmacy to mix and send straight 1/11 with supplies to match:  14x cefepime 1 gm syringes  DOS: 1/12 - 1/18    Follow up 1/18 plan of care Dr Alcala

## 2024-01-12 ENCOUNTER — HOME CARE VISIT (OUTPATIENT)
Dept: HOME HEALTH SERVICES | Facility: HOME HEALTH | Age: 89
End: 2024-01-12
Payer: MEDICARE

## 2024-01-12 ENCOUNTER — PATIENT OUTREACH (OUTPATIENT)
Dept: CARE COORDINATION | Facility: CLINIC | Age: 89
End: 2024-01-12
Payer: MEDICARE

## 2024-01-12 VITALS
OXYGEN SATURATION: 97 % | WEIGHT: 139.33 LBS | RESPIRATION RATE: 16 BRPM | HEIGHT: 59 IN | HEART RATE: 57 BPM | SYSTOLIC BLOOD PRESSURE: 124 MMHG | DIASTOLIC BLOOD PRESSURE: 60 MMHG | BODY MASS INDEX: 28.09 KG/M2 | TEMPERATURE: 98.4 F

## 2024-01-12 LAB
BACTERIA BLD AEROBE CULT: ABNORMAL
BACTERIA BLD CULT: ABNORMAL
GRAM STN SPEC: ABNORMAL
GRAM STN SPEC: ABNORMAL

## 2024-01-12 PROCEDURE — 0023 HH SOC

## 2024-01-12 PROCEDURE — G0299 HHS/HOSPICE OF RN EA 15 MIN: HCPCS

## 2024-01-12 NOTE — DISCHARGE SUMMARY
Discharge Diagnosis  Diverticulitis    Issues Requiring Follow-Up  pcp    Test Results Pending At Discharge  Pending Labs       No current pending labs.            Hospital Course   Patient with a past medical history of atrial fibrillation on Xarelto hypertension diabetes mellitus type 2 dyslipidemia stage III kidney disease history of primary hyperparathyroidism osteoporosis chronic pain syndrome comes in with sudden onset of left lower quadrant pain  Imaging shows acute diverticulitis  Patient was started on IV antibiotics which were limited because of multiple allergies  During the stay the patient's blood cultures returned positive for Pseudomonas and E. coli  Infectious disease was consulted  Underwent a TTE which did not show any endocarditis  Infectious disease recommends placement of Doty catheter  IV cefepime 1 g every 12 through 118  P.o. Flagyl 500 3 times daily through 118  During the patient states she also had atrial fibrillation with slow heart rate  Beta-blockers were stopped  IV fluids were stopped and she is hard diuretics resumed    Discharge diagnosis  Acute diverticulitis  Bacteremia with E. coli and Pseudomonas  Atrial fibrillation  Hypertension  Diabetes mellitus  Chronic kidney disease stage III  Dyslipidemia      Pertinent Physical Exam At Time of Discharge  Physical Exam    Constitutional   General appearance: Alert and in no acute distress.     Pulmonary   Respiratory assessment: No respiratory distress, normal respiratory rhythm and effort.    Auscultation of Lungs: Clear bilateral breath sounds.   Cardiovascular   Auscultation of heart: Irregular  Exam for edema: 1+ edema  Abdomen   Abdominal Exam: No bruits, normal bowel sounds, soft, non-tender, no abdominal mass palpated.    Liver and Spleen exam: No hepato-splenomegaly.   Musculoskeletal   Examination of gait: Normal.    Inspection of digits and nails: No clubbing or cyanosis of the fingernails.    Inspection/palpation of joints,  bones and muscles: No joint swelling. Normal movement of all extremities.   Skin   Skin inspection: Normal skin color and pigmentation, normal skin turgor and no visible rash.   Neurologic   Cranial nerves: Nerves 2-12 were intact, no focal neuro defects.       Home Medications     Medication List      START taking these medications     cefepime 1 g in dextrose 5 % 5 % 50 mL IV; Infuse 1 g at 100 mL/hr over   30 minutes into a venous catheter every 12 hours for 10 days. Labs on   1/15/24 CBC/diff and Creatinine fax to Dr. Alcala [phone]. Stop date   1/18/24.   metroNIDAZOLE 500 mg tablet; Commonly known as: Flagyl; Take 1 tablet   (500 mg) by mouth every 8 hours for 10 days.     CONTINUE taking these medications     acetaminophen 500 mg tablet; Commonly known as: Tylenol   albuterol 90 mcg/actuation inhaler   amLODIPine 5 mg tablet; Commonly known as: Norvasc   benzonatate 200 mg capsule; Commonly known as: Tessalon   calcium carbonate-vitamin D3 600 mg-10 mcg (400 unit) capsule   cholecalciferol 25 MCG (1000 UT) capsule; Commonly known as: Vitamin D-3   clobetasol 0.05 % ointment; Commonly known as: Temovate   furosemide 40 mg tablet; Commonly known as: Lasix; TAKE 1 TABLET TWICE A   DAY BY  MOUTH   ketoconazole 2 % cream; Commonly known as: NIZOral   ketotifen 0.025 % (0.035 %) ophthalmic solution; Commonly known as:   Zaditor   Ocuvite with Lutein 300 mcg-200 mg-27 mg-2 mg tablet; Generic drug: vit   A,C and E-lutein-minerals   pregabalin 25 mg capsule; Commonly known as: Lyrica   rosuvastatin 5 mg tablet; Commonly known as: Crestor; Take 1 tablet (5   mg) by mouth once daily.   spironolactone 25 mg tablet; Commonly known as: Aldactone; Take 1 tablet   (25 mg) by mouth once daily.   Xarelto 15 mg tablet; Generic drug: rivaroxaban     STOP taking these medications     metoprolol succinate XL 50 mg 24 hr tablet; Commonly known as: Toprol-XL       Outpatient Follow-Up  Future Appointments   Date Time Provider  Department Center   1/15/2024 To Be Determined Josefina Bañuelos, PT [facility]   1/15/2024 To Be Determined Kirstie Pinto, OT [facility]   1/30/2024  3:20 PM JULIUS Tobar-CNP [id number]   3/28/2024  2:30 PM Reno Alfred MD [id number] [facility]   4/22/2024  1:00 PM Facundo King MD [id number]     Patient seen at bedside. Events from the last visit reviewed. Discussed with staff. Results of tests and investigations from last visit reviewed and discussed with patient/Family. Electronic chart on [facility] reviewed. Input / Recommendations  from consultants  appreciated and reviewed and agreed with.     discharge summary and profile completed. medications reviewed and discussed with patient and family.  scripts completed and signed.     total discharge time in excess of 30 minutes.    Marian Perez MD

## 2024-01-12 NOTE — CARE PLAN
The patient's goals for the shift include  going home    The clinical goals for the shift include to get antiboitic and go home    Over the shift, the patient did not make progress toward the following goals. Barriers to progression include ***. Recommendations to address these barriers include ***.

## 2024-01-12 NOTE — PROGRESS NOTES
Discharge Facility:[facility]  Discharge Diagnosis:Diverticulitis, bacteremia  Admission Date:01/01/24  Discharge Date: 01/11/24    PCP Appointment Date:none available sent to pcp office  Specialist Appointment Date:   Hospital Encounter and Summary: Linked  See discharge assessment below for further details    Engagement  Call Start Time: 0833 (1/12/2024  8:33 AM)    Medications  Medications reviewed with patient/caregiver?: Yes (metromidazole 500mg) (1/12/2024  8:33 AM)  Is the patient having any side effects they believe may be caused by any medication additions or changes?: No (1/12/2024  8:33 AM)  Does the patient have all medications ordered at discharge?: Yes (1/12/2024  8:33 AM)  Is the patient taking all medications as directed (includes completed medication regime)?: Yes (1/12/2024  8:33 AM)    Appointments  Does the patient have a primary care provider?: Yes (1/12/2024  8:33 AM)  Care Management Interventions: Advised patient to make appointment (1/12/2024  8:33 AM)    Self Management  Has home health visited the patient within 72 hours of discharge?: No (calling today) (1/12/2024  8:33 AM)    Patient Teaching  Does the patient have access to their discharge instructions?: Yes (1/12/2024  8:33 AM)  Care Management Interventions: Reviewed instructions with patient (1/12/2024  8:33 AM)  What is the patient's perception of their health status since discharge?: Improving (1/12/2024  8:33 AM)    Wrap Up  Call End Time: 0840 (1/12/2024  8:33 AM)

## 2024-01-13 ASSESSMENT — ACTIVITIES OF DAILY LIVING (ADL)
OASIS_M1830: 03
BATHING ASSESSED: 1
ENTERING_EXITING_HOME: SUPERVISION
AMBULATION ASSISTANCE: STAND BY ASSIST
AMBULATION ASSISTANCE: 1
BATHING_CURRENT_FUNCTION: ONE PERSON

## 2024-01-13 ASSESSMENT — ENCOUNTER SYMPTOMS
APPETITE LEVEL: GOOD
FORGETFULNESS: 1
HYPERTENSION: 1
CHANGE IN APPETITE: UNCHANGED
STOOL FREQUENCY: LESS THAN DAILY
BOWEL INCONTINENCE: 1
FREQUENCY: 1

## 2024-01-15 ENCOUNTER — HOME CARE VISIT (OUTPATIENT)
Dept: HOME HEALTH SERVICES | Facility: HOME HEALTH | Age: 89
End: 2024-01-15
Payer: MEDICARE

## 2024-01-15 ENCOUNTER — LAB REQUISITION (OUTPATIENT)
Dept: LAB | Facility: LAB | Age: 89
End: 2024-01-15
Payer: MEDICARE

## 2024-01-15 ENCOUNTER — TELEPHONE (OUTPATIENT)
Dept: PRIMARY CARE | Facility: CLINIC | Age: 89
End: 2024-01-15

## 2024-01-15 VITALS — HEART RATE: 66 BPM | OXYGEN SATURATION: 96 %

## 2024-01-15 VITALS
SYSTOLIC BLOOD PRESSURE: 120 MMHG | OXYGEN SATURATION: 98 % | HEART RATE: 59 BPM | DIASTOLIC BLOOD PRESSURE: 60 MMHG | RESPIRATION RATE: 16 BRPM | TEMPERATURE: 97 F

## 2024-01-15 DIAGNOSIS — R00.1 BRADYCARDIA WITH 41-50 BEATS PER MINUTE: Primary | ICD-10-CM

## 2024-01-15 DIAGNOSIS — R78.81 BACTEREMIA: ICD-10-CM

## 2024-01-15 LAB
ANION GAP SERPL CALC-SCNC: 12 MMOL/L (ref 10–20)
BASOPHILS # BLD AUTO: 0.06 X10*3/UL (ref 0–0.1)
BASOPHILS NFR BLD AUTO: 1.2 %
BUN SERPL-MCNC: 20 MG/DL (ref 6–23)
CALCIUM SERPL-MCNC: 10 MG/DL (ref 8.6–10.6)
CHLORIDE SERPL-SCNC: 105 MMOL/L (ref 98–107)
CO2 SERPL-SCNC: 28 MMOL/L (ref 21–32)
CREAT SERPL-MCNC: 1.09 MG/DL (ref 0.5–1.05)
EGFRCR SERPLBLD CKD-EPI 2021: 47 ML/MIN/1.73M*2
EOSINOPHIL # BLD AUTO: 0.12 X10*3/UL (ref 0–0.4)
EOSINOPHIL NFR BLD AUTO: 2.3 %
ERYTHROCYTE [DISTWIDTH] IN BLOOD BY AUTOMATED COUNT: 16.7 % (ref 11.5–14.5)
GLUCOSE SERPL-MCNC: 246 MG/DL (ref 74–99)
HCT VFR BLD AUTO: 33.5 % (ref 36–46)
HGB BLD-MCNC: 10.3 G/DL (ref 12–16)
IMM GRANULOCYTES # BLD AUTO: 0.01 X10*3/UL (ref 0–0.5)
IMM GRANULOCYTES NFR BLD AUTO: 0.2 % (ref 0–0.9)
LYMPHOCYTES # BLD AUTO: 1.41 X10*3/UL (ref 0.8–3)
LYMPHOCYTES NFR BLD AUTO: 27.6 %
MCH RBC QN AUTO: 26.7 PG (ref 26–34)
MCHC RBC AUTO-ENTMCNC: 30.7 G/DL (ref 32–36)
MCV RBC AUTO: 87 FL (ref 80–100)
MONOCYTES # BLD AUTO: 0.49 X10*3/UL (ref 0.05–0.8)
MONOCYTES NFR BLD AUTO: 9.6 %
NEUTROPHILS # BLD AUTO: 3.02 X10*3/UL (ref 1.6–5.5)
NEUTROPHILS NFR BLD AUTO: 59.1 %
NRBC BLD-RTO: 0 /100 WBCS (ref 0–0)
PLATELET # BLD AUTO: 211 X10*3/UL (ref 150–450)
POTASSIUM SERPL-SCNC: 3.6 MMOL/L (ref 3.5–5.3)
RBC # BLD AUTO: 3.86 X10*6/UL (ref 4–5.2)
SODIUM SERPL-SCNC: 141 MMOL/L (ref 136–145)
WBC # BLD AUTO: 5.1 X10*3/UL (ref 4.4–11.3)

## 2024-01-15 PROCEDURE — G0151 HHCP-SERV OF PT,EA 15 MIN: HCPCS

## 2024-01-15 PROCEDURE — G0299 HHS/HOSPICE OF RN EA 15 MIN: HCPCS

## 2024-01-15 PROCEDURE — G0152 HHCP-SERV OF OT,EA 15 MIN: HCPCS

## 2024-01-15 PROCEDURE — 80048 BASIC METABOLIC PNL TOTAL CA: CPT

## 2024-01-15 PROCEDURE — 85025 COMPLETE CBC W/AUTO DIFF WBC: CPT

## 2024-01-15 SDOH — ECONOMIC STABILITY: GENERAL

## 2024-01-15 ASSESSMENT — ACTIVITIES OF DAILY LIVING (ADL)
TOILETING: 1
BATHING_CURRENT_FUNCTION: MODERATE ASSIST
DRESSING_UB_CURRENT_FUNCTION: STAND BY ASSIST
CURRENT_FUNCTION: ONE PERSON
TOILETING: SUPERVISION
MONEY MANAGEMENT (EXPENSES/BILLS): NEEDS ASSISTANCE
AMBULATION ASSISTANCE: ONE PERSON
GROOMING_CURRENT_FUNCTION: SUPERVISION
GROOMING ASSESSED: 1
GROOMING EQUIPMENT USED: SET UP
BATHING ASSESSED: 1
DRESSING_LB_CURRENT_FUNCTION: MINIMUM ASSIST

## 2024-01-15 ASSESSMENT — ENCOUNTER SYMPTOMS
STOOL FREQUENCY: LESS THAN DAILY
APPETITE LEVEL: GOOD
MUSCLE WEAKNESS: 1
DENIES PAIN: 1
CHANGE IN APPETITE: INCREASED
PAIN: 1
MUSCLE WEAKNESS: 1
DIARRHEA: 1
PERSON REPORTING PAIN: PATIENT

## 2024-01-16 ENCOUNTER — ANCILLARY PROCEDURE (OUTPATIENT)
Dept: CARDIOLOGY | Facility: CLINIC | Age: 89
End: 2024-01-16
Payer: MEDICARE

## 2024-01-16 DIAGNOSIS — R00.1 BRADYCARDIA WITH 41-50 BEATS PER MINUTE: ICD-10-CM

## 2024-01-16 PROCEDURE — 93246 EXT ECG>7D<15D RECORDING: CPT

## 2024-01-16 PROCEDURE — 93248 EXT ECG>7D<15D REV&INTERPJ: CPT | Performed by: INTERNAL MEDICINE

## 2024-01-17 ENCOUNTER — HOME CARE VISIT (OUTPATIENT)
Dept: HOME HEALTH SERVICES | Facility: HOME HEALTH | Age: 89
End: 2024-01-17
Payer: MEDICARE

## 2024-01-17 VITALS — OXYGEN SATURATION: 97 % | HEART RATE: 55 BPM

## 2024-01-17 PROCEDURE — G0152 HHCP-SERV OF OT,EA 15 MIN: HCPCS

## 2024-01-17 ASSESSMENT — ENCOUNTER SYMPTOMS: DENIES PAIN: 1

## 2024-01-17 ASSESSMENT — ACTIVITIES OF DAILY LIVING (ADL)
DRESSING_LB_CURRENT_FUNCTION: MODERATE ASSIST
DRESSING_LB_CURRENT_FUNCTION: MINIMUM ASSIST
DRESSING_UB_CURRENT_FUNCTION: MINIMUM ASSIST

## 2024-01-18 ENCOUNTER — HOME CARE VISIT (OUTPATIENT)
Dept: HOME HEALTH SERVICES | Facility: HOME HEALTH | Age: 89
End: 2024-01-18
Payer: MEDICARE

## 2024-01-18 ENCOUNTER — TELEPHONE (OUTPATIENT)
Dept: PRIMARY CARE | Facility: CLINIC | Age: 89
End: 2024-01-18
Payer: MEDICARE

## 2024-01-18 PROCEDURE — G0157 HHC PT ASSISTANT EA 15: HCPCS

## 2024-01-18 SDOH — HEALTH STABILITY: PHYSICAL HEALTH: EXERCISE ACTIVITY: B LES

## 2024-01-18 SDOH — HEALTH STABILITY: PHYSICAL HEALTH: EXERCISE ACTIVITIES SETS: 1

## 2024-01-18 SDOH — HEALTH STABILITY: PHYSICAL HEALTH: PHYSICAL EXERCISE: STANDING

## 2024-01-18 SDOH — HEALTH STABILITY: PHYSICAL HEALTH: PHYSICAL EXERCISE: SEATED

## 2024-01-18 SDOH — HEALTH STABILITY: PHYSICAL HEALTH: PHYSICAL EXERCISE: 10

## 2024-01-18 SDOH — HEALTH STABILITY: PHYSICAL HEALTH
EXERCISE COMMENTS: PT EDUCATED THROUGH GIVEN HOME EX PROGRAM TO BLES. PT COMPLETED 10 REPS OF EACH EX AS PREVIOUSLY NOTED. PT DEMONSTRATED WELL

## 2024-01-18 SDOH — HEALTH STABILITY: PHYSICAL HEALTH: EXERCISE TYPE: STRENGTHENING B LES

## 2024-01-18 SDOH — HEALTH STABILITY: PHYSICAL HEALTH: PHYSICAL EXERCISE: SUPINE

## 2024-01-18 SDOH — HEALTH STABILITY: PHYSICAL HEALTH: EXERCISE ACTIVITY: STRENGTHENING B LES

## 2024-01-18 ASSESSMENT — ACTIVITIES OF DAILY LIVING (ADL): AMBULATION ASSISTANCE ON FLAT SURFACES: 1

## 2024-01-18 NOTE — TELEPHONE ENCOUNTER
Spoke w/Grace and she will let family know. Family does not think she needs to go. Informed of risks.

## 2024-01-22 ENCOUNTER — HOME CARE VISIT (OUTPATIENT)
Dept: HOME HEALTH SERVICES | Facility: HOME HEALTH | Age: 89
End: 2024-01-22
Payer: MEDICARE

## 2024-01-22 ENCOUNTER — TELEPHONE (OUTPATIENT)
Dept: CARDIOLOGY | Facility: HOSPITAL | Age: 89
End: 2024-01-22

## 2024-01-22 VITALS — OXYGEN SATURATION: 97 % | HEART RATE: 54 BPM

## 2024-01-22 VITALS
RESPIRATION RATE: 16 BRPM | OXYGEN SATURATION: 98 % | SYSTOLIC BLOOD PRESSURE: 142 MMHG | TEMPERATURE: 97.3 F | DIASTOLIC BLOOD PRESSURE: 68 MMHG | HEART RATE: 62 BPM

## 2024-01-22 PROCEDURE — G0152 HHCP-SERV OF OT,EA 15 MIN: HCPCS

## 2024-01-22 PROCEDURE — G0300 HHS/HOSPICE OF LPN EA 15 MIN: HCPCS

## 2024-01-22 ASSESSMENT — ENCOUNTER SYMPTOMS
PAIN SEVERITY GOAL: 2/10
PERSON REPORTING PAIN: PATIENT
PAIN: 1
HIGHEST PAIN SEVERITY IN PAST 24 HOURS: 5/10
PAIN LOCATION: BACK

## 2024-01-23 ENCOUNTER — OFFICE VISIT (OUTPATIENT)
Dept: PRIMARY CARE | Facility: CLINIC | Age: 89
End: 2024-01-23
Payer: MEDICARE

## 2024-01-23 ENCOUNTER — HOSPITAL ENCOUNTER (OUTPATIENT)
Dept: CARDIOLOGY | Facility: HOSPITAL | Age: 89
Discharge: HOME | End: 2024-01-23
Payer: MEDICARE

## 2024-01-23 VITALS
BODY MASS INDEX: 29.23 KG/M2 | OXYGEN SATURATION: 96 % | SYSTOLIC BLOOD PRESSURE: 124 MMHG | RESPIRATION RATE: 12 BRPM | HEIGHT: 59 IN | DIASTOLIC BLOOD PRESSURE: 72 MMHG | WEIGHT: 145 LBS | HEART RATE: 50 BPM

## 2024-01-23 VITALS
RESPIRATION RATE: 16 BRPM | WEIGHT: 130 LBS | DIASTOLIC BLOOD PRESSURE: 70 MMHG | SYSTOLIC BLOOD PRESSURE: 162 MMHG | BODY MASS INDEX: 26.21 KG/M2 | OXYGEN SATURATION: 99 % | HEART RATE: 55 BPM | TEMPERATURE: 98.4 F | HEIGHT: 59 IN

## 2024-01-23 DIAGNOSIS — R63.0 DECREASE IN APPETITE: ICD-10-CM

## 2024-01-23 DIAGNOSIS — E11.65 TYPE 2 DIABETES MELLITUS WITH HYPERGLYCEMIA, WITHOUT LONG-TERM CURRENT USE OF INSULIN (MULTI): ICD-10-CM

## 2024-01-23 DIAGNOSIS — R19.7 DIARRHEA, UNSPECIFIED TYPE: ICD-10-CM

## 2024-01-23 DIAGNOSIS — B96.5 PSEUDOMONAS (AERUGINOSA) (MALLEI) (PSEUDOMALLEI) AS THE CAUSE OF DISEASES CLASSIFIED ELSEWHERE: ICD-10-CM

## 2024-01-23 DIAGNOSIS — R78.81 BACTEREMIA: ICD-10-CM

## 2024-01-23 DIAGNOSIS — K57.92 DIVERTICULITIS: Primary | ICD-10-CM

## 2024-01-23 DIAGNOSIS — R53.83 OTHER FATIGUE: ICD-10-CM

## 2024-01-23 PROCEDURE — 1170F FXNL STATUS ASSESSED: CPT | Performed by: FAMILY MEDICINE

## 2024-01-23 PROCEDURE — 3074F SYST BP LT 130 MM HG: CPT | Performed by: FAMILY MEDICINE

## 2024-01-23 PROCEDURE — 1111F DSCHRG MED/CURRENT MED MERGE: CPT | Performed by: FAMILY MEDICINE

## 2024-01-23 PROCEDURE — 36589 REMOVAL TUNNELED CV CATH: CPT

## 2024-01-23 PROCEDURE — 1159F MED LIST DOCD IN RCRD: CPT | Performed by: FAMILY MEDICINE

## 2024-01-23 PROCEDURE — 1157F ADVNC CARE PLAN IN RCRD: CPT | Performed by: FAMILY MEDICINE

## 2024-01-23 PROCEDURE — 1160F RVW MEDS BY RX/DR IN RCRD: CPT | Performed by: FAMILY MEDICINE

## 2024-01-23 PROCEDURE — 1125F AMNT PAIN NOTED PAIN PRSNT: CPT | Performed by: FAMILY MEDICINE

## 2024-01-23 PROCEDURE — 1036F TOBACCO NON-USER: CPT | Performed by: FAMILY MEDICINE

## 2024-01-23 PROCEDURE — 1123F ACP DISCUSS/DSCN MKR DOCD: CPT | Performed by: FAMILY MEDICINE

## 2024-01-23 PROCEDURE — 3078F DIAST BP <80 MM HG: CPT | Performed by: FAMILY MEDICINE

## 2024-01-23 PROCEDURE — 99495 TRANSJ CARE MGMT MOD F2F 14D: CPT | Performed by: FAMILY MEDICINE

## 2024-01-23 RX ORDER — FLUOCINOLONE ACETONIDE 0.1 MG/ML
1 SOLUTION TOPICAL 2 TIMES DAILY
COMMUNITY
End: 2024-01-25 | Stop reason: SDUPTHER

## 2024-01-23 ASSESSMENT — PATIENT HEALTH QUESTIONNAIRE - PHQ9
5. POOR APPETITE OR OVEREATING: NEARLY EVERY DAY
SUM OF ALL RESPONSES TO PHQ9 QUESTIONS 1 AND 2: 3
8. MOVING OR SPEAKING SO SLOWLY THAT OTHER PEOPLE COULD HAVE NOTICED. OR THE OPPOSITE, BEING SO FIGETY OR RESTLESS THAT YOU HAVE BEEN MOVING AROUND A LOT MORE THAN USUAL: NOT AT ALL
SUM OF ALL RESPONSES TO PHQ QUESTIONS 1-9: 15
4. FEELING TIRED OR HAVING LITTLE ENERGY: NEARLY EVERY DAY
9. THOUGHTS THAT YOU WOULD BE BETTER OFF DEAD, OR OF HURTING YOURSELF: NOT AT ALL
7. TROUBLE CONCENTRATING ON THINGS, SUCH AS READING THE NEWSPAPER OR WATCHING TELEVISION: NEARLY EVERY DAY
3. TROUBLE FALLING OR STAYING ASLEEP OR SLEEPING TOO MUCH: NEARLY EVERY DAY
2. FEELING DOWN, DEPRESSED OR HOPELESS: NOT AT ALL
10. IF YOU CHECKED OFF ANY PROBLEMS, HOW DIFFICULT HAVE THESE PROBLEMS MADE IT FOR YOU TO DO YOUR WORK, TAKE CARE OF THINGS AT HOME, OR GET ALONG WITH OTHER PEOPLE: VERY DIFFICULT
1. LITTLE INTEREST OR PLEASURE IN DOING THINGS: NEARLY EVERY DAY
6. FEELING BAD ABOUT YOURSELF - OR THAT YOU ARE A FAILURE OR HAVE LET YOURSELF OR YOUR FAMILY DOWN: NOT AT ALL

## 2024-01-23 ASSESSMENT — ENCOUNTER SYMPTOMS
MYALGIAS: 0
ADENOPATHY: 0
OCCASIONAL FEELINGS OF UNSTEADINESS: 1
ARTHRALGIAS: 1
DIFFICULTY URINATING: 0
SINUS PRESSURE: 0
CHILLS: 0
HEADACHES: 0
LIGHT-HEADEDNESS: 0
BRUISES/BLEEDS EASILY: 0
DYSURIA: 0
BACK PAIN: 1
FEVER: 0
NERVOUS/ANXIOUS: 0
LOSS OF SENSATION IN FEET: 0
SLEEP DISTURBANCE: 0
DIARRHEA: 1
DEPRESSION: 0
FATIGUE: 1
NAUSEA: 0
FACIAL ASYMMETRY: 0
SHORTNESS OF BREATH: 0
DYSPHORIC MOOD: 0
ABDOMINAL PAIN: 0
WHEEZING: 0
APPETITE CHANGE: 1
ABDOMINAL DISTENTION: 0
CHEST TIGHTNESS: 0

## 2024-01-23 ASSESSMENT — ACTIVITIES OF DAILY LIVING (ADL)
DOING_HOUSEWORK: TOTAL CARE
MANAGING_FINANCES: TOTAL CARE
BATHING: NEEDS ASSISTANCE
TAKING_MEDICATION: NEEDS ASSISTANCE
GROCERY_SHOPPING: TOTAL CARE
DRESSING: NEEDS ASSISTANCE

## 2024-01-23 ASSESSMENT — PAIN - FUNCTIONAL ASSESSMENT
PAIN_FUNCTIONAL_ASSESSMENT: 0-10
PAIN_FUNCTIONAL_ASSESSMENT: 0-10

## 2024-01-23 ASSESSMENT — PAIN SCALES - GENERAL: PAINLEVEL_OUTOF10: 0 - NO PAIN

## 2024-01-23 NOTE — PROGRESS NOTES
"Subjective   Patient ID: Aicha Lord is a 89 y.o. female who presents for Hospital Follow-up.    HPI     Review of Systems    Objective   /72   Pulse 50   Resp 12   Ht 1.499 m (4' 11\")   Wt 65.8 kg (145 lb)   SpO2 96%   BMI 29.29 kg/m²     Physical Exam    Assessment/Plan          "

## 2024-01-23 NOTE — INTERVAL H&P NOTE
H&P reviewed. The patient was examined and there are no changes to the H&P.  Completion of IV abx 1/18. Denies fever, chills, or abdominal pain.   Plan for removal of tunneled PICC today.

## 2024-01-23 NOTE — POST-PROCEDURE NOTE
Interventional Radiology Brief Postprocedure Note    Attending: Anna Marie Zeng CNP    Assistant: none    Diagnosis: Tunneled PICC line, completion of IV abx 1/18/24.     Description of procedure: The patient was placed in the supine position. The patient's skin was prepped and draped in a typical sterile manner. Lidocaine 1% was given at the site of catheter insertion. Using blunt dissection, the catheter cuff was externalized. The catheter was removed in its entirety while pressure was held at the insertion site and the internal jugular vein. Hemostasis was achieved and no hematoma was appreciated at the neck. Gauze and tegaderm were applied. Sterile technique was used throughout the procedure.     Anesthesia:  Local    Complications: None    Estimated Blood Loss: minimal    Specimens: No    See detailed result report with images in PACS.    The patient tolerated the procedure well without incident or complication and is in stable condition.      Telephone Encounter by Ad Huerta RN at 03/09/17 02:58 PM     Author:  Ad Huerta RN Service:  (none) Author Type:  Registered Nurse     Filed:  03/09/17 03:00 PM Encounter Date:  3/9/2017 Status:  Signed     :  Ad Huerta RN (Registered Nurse)            [address] [facility] notes from 1/17 said ok to repeat injection prn 3-4 months. Ok to reorder[JW1.1M] Caudal epidural steroid injection[JW1.1C]? Last seen by [facility] January 10th. [JW1.1M]        Revision History        User Key Date/Time User Provider Type Action    > JW1.1 03/09/17 03:00 PM Ad Huerta RN Registered Nurse Sign    C - Copied, M - Manual

## 2024-01-23 NOTE — PROGRESS NOTES
Subjective   Patient ID: Aicha Lord is a 89 y.o. female who presents for Hospital Follow-up and Medicare Annual Wellness Visit Subsequent.  Pt here for hospital 1/2-11 at [facility]. She was having abdominal pain. Found to have diverticulitis and treated with Cefepime and Flagyl due to blood cx + e.coli, pseudomonas . She has also had high BS. At home mostly in the 200s. Appetite is fair, gradually gaining weight back.. Denies abdominal pain. Still has diarrhea. 4-5x daily, small amount, nonbloody. It is stable sinice the Abx. Just finish both with the past week.         Review of Systems   Constitutional:  Positive for appetite change and fatigue. Negative for chills and fever.   HENT:  Negative for congestion, ear pain and sinus pressure.    Eyes:  Negative for visual disturbance.   Respiratory:  Negative for chest tightness, shortness of breath and wheezing.    Cardiovascular:  Negative for chest pain.   Gastrointestinal:  Positive for diarrhea. Negative for abdominal distention, abdominal pain and nausea.   Genitourinary:  Negative for difficulty urinating, dysuria and pelvic pain.   Musculoskeletal:  Positive for arthralgias and back pain. Negative for myalgias.   Skin:  Negative for rash.   Allergic/Immunologic: Negative for immunocompromised state.   Neurological:  Negative for facial asymmetry, light-headedness and headaches.   Hematological:  Negative for adenopathy. Does not bruise/bleed easily.   Psychiatric/Behavioral:  Negative for dysphoric mood and sleep disturbance. The patient is not nervous/anxious.    Discharge Facility:[facility]  Discharge Diagnosis:Diverticulitis, bacteremia  Admission Date:01/01/24  Discharge Date: 01/11/24    PCP Appointment Date:none available sent to pcp office  Specialist Appointment Date:   Hospital Encounter and Summary: Linked  See discharge assessment below for further details    Engagement  Call Start Time: 0833 (1/12/2024  8:33 AM)    Medications  Medications reviewed  "with patient/caregiver?: Yes (metromidazole 500mg) (1/12/2024  8:33 AM)  Is the patient having any side effects they believe may be caused by any medication additions or changes?: No (1/12/2024  8:33 AM)  Does the patient have all medications ordered at discharge?: Yes (1/12/2024  8:33 AM)  Is the patient taking all medications as directed (includes completed medication regime)?: Yes (1/12/2024  8:33 AM)    Appointments  Does the patient have a primary care provider?: Yes (1/12/2024  8:33 AM)  Care Management Interventions: Advised patient to make appointment (1/12/2024  8:33 AM)    Self Management  Has home health visited the patient within 72 hours of discharge?: No (calling today) (1/12/2024  8:33 AM)    Patient Teaching  Does the patient have access to their discharge instructions?: Yes (1/12/2024  8:33 AM)  Care Management Interventions: Reviewed instructions with patient (1/12/2024  8:33 AM)  What is the patient's perception of their health status since discharge?: Improving (1/12/2024  8:33 AM)    Wrap Up  Call End Time: 0840 (1/12/2024  8:33 AM)        Objective   /72   Pulse 50   Resp 12   Ht 1.499 m (4' 11\")   Wt 65.8 kg (145 lb)   SpO2 96%   BMI 29.29 kg/m²    Physical Exam  Constitutional:       General: She is not in acute distress.     Appearance: Normal appearance.   Cardiovascular:      Rate and Rhythm: Normal rate and regular rhythm.      Heart sounds: Normal heart sounds. No murmur heard.  Pulmonary:      Effort: Pulmonary effort is normal.      Breath sounds: Normal breath sounds.   Abdominal:      Palpations: Abdomen is soft.      Tenderness: There is no abdominal tenderness.   Neurological:      Mental Status: She is alert.   Psychiatric:         Mood and Affect: Mood normal.         Judgment: Judgment normal.       Assessment/Plan   Diagnoses and all orders for this visit:  Diverticulitis - clinically improving, finished with antibiotics. Keep follow up visit with GI.  Diarrhea/Type " 2 diabetes mellitus with hyperglycemia - check UA/ UCX and C.difficile  Other fatigue/ Decrease in appetite - gaining weight back gradually. Monitor.    Follow up in 3 months, 30min for preventative.

## 2024-01-23 NOTE — DISCHARGE INSTRUCTIONS
Central Venous Catheter Removal- Patient and Family Education    A trained health care provider, as ordered by your doctor has removed your Central Venous  Catheter. The following instructions will provide a guideline to decrease the risk of  complications while caring for your removal site as it heals.    Central Venous Catheter Removed: Tunneled Picc   ______ Port   ______ Pheresis Catheter   ______ Dialysis Catheter   ___X___ Tunneled Central Catheter   ______ PICC Line    Activity:  ? No exercising, lifting heavy objects, or strenuous activities for the next seven days.    Pain Management:  ? Expect some minor discomfort at the surgical site for the next few days.  ? You may use over-the-counter medications such as Acetaminophen (Tylenol) or  Ibuprofen (Advil or Motrin) for minor discomfort, unless restricted for some other  reasons.    Care of your incision after device removal:  ? Keep dressing dry for 2 days  ? If you have sterile paper strips over the incision allow them to fall off on their own.  ? You may shower 48 hours after the dressing is removed but do not soak the incision for 2 weeks  (no swimming, bathing or hot tubs until completely healed).    Medications:  ? Resume taking all your previous medications.  ? If you are taking blood-thinning medication, please follow special instructions by your  doctor.    Call your Doctor right away, if you have any of these signs:  ? Fever higher than a temperature of 101.5°F or chills.  ? Swelling or severe pain on the side the catheter was removed.  ? Increased bleeding, redness or drainage at or around the removal site.     How to Reach your Doctor:    Call [phone] with problems or questions.     This info is a general resource. It is not meant to replace your health care provider’s advice.  Ask your doctor or health care team any questions. Always follow their instructions.

## 2024-01-24 ENCOUNTER — HOME CARE VISIT (OUTPATIENT)
Dept: HOME HEALTH SERVICES | Facility: HOME HEALTH | Age: 89
End: 2024-01-24
Payer: MEDICARE

## 2024-01-24 ENCOUNTER — LAB (OUTPATIENT)
Dept: LAB | Facility: LAB | Age: 89
End: 2024-01-24
Payer: MEDICARE

## 2024-01-24 ENCOUNTER — PATIENT OUTREACH (OUTPATIENT)
Dept: CARE COORDINATION | Facility: CLINIC | Age: 89
End: 2024-01-24
Payer: MEDICARE

## 2024-01-24 DIAGNOSIS — R19.7 DIARRHEA, UNSPECIFIED TYPE: ICD-10-CM

## 2024-01-24 DIAGNOSIS — R53.83 OTHER FATIGUE: ICD-10-CM

## 2024-01-24 DIAGNOSIS — R19.7 DIARRHEA, UNSPECIFIED TYPE: Primary | ICD-10-CM

## 2024-01-24 LAB
APPEARANCE UR: ABNORMAL
BACTERIA #/AREA URNS AUTO: ABNORMAL /HPF
BILIRUB UR STRIP.AUTO-MCNC: NEGATIVE MG/DL
C DIF TOX TCDA+TCDB STL QL NAA+PROBE: NOT DETECTED
CAOX CRY #/AREA UR COMP ASSIST: ABNORMAL /HPF
COLOR UR: YELLOW
GLUCOSE UR STRIP.AUTO-MCNC: NEGATIVE MG/DL
KETONES UR STRIP.AUTO-MCNC: NEGATIVE MG/DL
LEUKOCYTE ESTERASE UR QL STRIP.AUTO: ABNORMAL
MUCOUS THREADS #/AREA URNS AUTO: ABNORMAL /LPF
NITRITE UR QL STRIP.AUTO: NEGATIVE
PH UR STRIP.AUTO: 6 [PH]
PROT UR STRIP.AUTO-MCNC: ABNORMAL MG/DL
RBC # UR STRIP.AUTO: NEGATIVE /UL
RBC #/AREA URNS AUTO: ABNORMAL /HPF
RENAL EPI CELLS #/AREA UR COMP ASSIST: ABNORMAL /HPF
SP GR UR STRIP.AUTO: 1.02
SQUAMOUS #/AREA URNS AUTO: ABNORMAL /HPF
UROBILINOGEN UR STRIP.AUTO-MCNC: <2 MG/DL
WBC #/AREA URNS AUTO: ABNORMAL /HPF

## 2024-01-24 PROCEDURE — 87493 C DIFF AMPLIFIED PROBE: CPT

## 2024-01-24 PROCEDURE — G0152 HHCP-SERV OF OT,EA 15 MIN: HCPCS

## 2024-01-24 PROCEDURE — G0157 HHC PT ASSISTANT EA 15: HCPCS

## 2024-01-24 PROCEDURE — 81001 URINALYSIS AUTO W/SCOPE: CPT

## 2024-01-24 PROCEDURE — 87186 SC STD MICRODIL/AGAR DIL: CPT

## 2024-01-24 PROCEDURE — 87086 URINE CULTURE/COLONY COUNT: CPT

## 2024-01-24 SDOH — HEALTH STABILITY: PHYSICAL HEALTH: PHYSICAL EXERCISE: STANDING

## 2024-01-24 SDOH — HEALTH STABILITY: PHYSICAL HEALTH: PHYSICAL EXERCISE: SEATED

## 2024-01-24 SDOH — HEALTH STABILITY: PHYSICAL HEALTH: EXERCISE ACTIVITIES SETS: 1

## 2024-01-24 SDOH — HEALTH STABILITY: PHYSICAL HEALTH: PHYSICAL EXERCISE: 10

## 2024-01-24 SDOH — HEALTH STABILITY: PHYSICAL HEALTH: EXERCISE ACTIVITY: STRENGTHENING B LES

## 2024-01-24 SDOH — HEALTH STABILITY: PHYSICAL HEALTH: EXERCISE ACTIVITY: B LES

## 2024-01-24 SDOH — HEALTH STABILITY: PHYSICAL HEALTH: EXERCISE TYPE: STRENGTHENING B LES

## 2024-01-24 SDOH — HEALTH STABILITY: PHYSICAL HEALTH: PHYSICAL EXERCISE: SUPINE

## 2024-01-24 ASSESSMENT — ACTIVITIES OF DAILY LIVING (ADL)
GROOMING_CURRENT_FUNCTION: STAND BY ASSIST
GROOMING ASSESSED: 1
AMBULATION_DISTANCE/DURATION_TOLERATED: 50 FT X 2
AMBULATION ASSISTANCE ON FLAT SURFACES: 1
DRESSING_UB_CURRENT_FUNCTION: STAND BY ASSIST
GROOMING_CURRENT_FUNCTION: SUPERVISION
DRESSING_LB_CURRENT_FUNCTION: CONTACT GUARD ASSIST
DRESSING_UB_CURRENT_FUNCTION: SUPERVISION
TOILETING: STAND BY ASSIST
DRESSING_LB_CURRENT_FUNCTION: MINIMUM ASSIST
DRESSING_LB_CURRENT_FUNCTION: SUPERVISION
TOILETING: 1

## 2024-01-24 ASSESSMENT — ENCOUNTER SYMPTOMS
PAIN: 1
PERSON REPORTING PAIN: PATIENT
PAIN LOCATION: BACK

## 2024-01-24 NOTE — PROGRESS NOTES
Call regarding appt. with PCP on  01/23/24 after hospitalization.  At time of outreach call the patient feels as if their condition has (improved  since last visit.  Reviewed the PCP appointment with the pt and addressed any questions or concerns.

## 2024-01-25 DIAGNOSIS — L29.9 PRURITIC CONDITION: Primary | ICD-10-CM

## 2024-01-25 LAB — HOLD SPECIMEN: NORMAL

## 2024-01-25 RX ORDER — FLUOCINOLONE ACETONIDE 0.1 MG/ML
1 SOLUTION TOPICAL 2 TIMES DAILY
Qty: 60 ML | Refills: 0 | Status: SHIPPED | OUTPATIENT
Start: 2024-01-25

## 2024-01-26 ENCOUNTER — HOME CARE VISIT (OUTPATIENT)
Dept: HOME HEALTH SERVICES | Facility: HOME HEALTH | Age: 89
End: 2024-01-26
Payer: MEDICARE

## 2024-01-26 PROCEDURE — G0157 HHC PT ASSISTANT EA 15: HCPCS

## 2024-01-26 SDOH — HEALTH STABILITY: PHYSICAL HEALTH: PHYSICAL EXERCISE: 10

## 2024-01-26 SDOH — HEALTH STABILITY: PHYSICAL HEALTH: EXERCISE ACTIVITY: STRENGTHEING B LES

## 2024-01-26 SDOH — HEALTH STABILITY: PHYSICAL HEALTH: PHYSICAL EXERCISE: SUPINE

## 2024-01-26 SDOH — HEALTH STABILITY: PHYSICAL HEALTH: EXERCISE ACTIVITIES SETS: 2

## 2024-01-26 SDOH — HEALTH STABILITY: PHYSICAL HEALTH: EXERCISE ACTIVITY: B LES

## 2024-01-26 SDOH — HEALTH STABILITY: PHYSICAL HEALTH: EXERCISE ACTIVITIES SETS: 1

## 2024-01-26 SDOH — HEALTH STABILITY: PHYSICAL HEALTH: PHYSICAL EXERCISE: SEATED

## 2024-01-26 SDOH — HEALTH STABILITY: PHYSICAL HEALTH: PHYSICAL EXERCISE: STANDING

## 2024-01-26 SDOH — HEALTH STABILITY: PHYSICAL HEALTH: EXERCISE TYPE: STRENGTHENING TO BLES

## 2024-01-26 ASSESSMENT — ACTIVITIES OF DAILY LIVING (ADL): AMBULATION ASSISTANCE ON FLAT SURFACES: 1

## 2024-01-26 NOTE — ADDENDUM NOTE
Encounter addended by: Letty Sifuentes RN on: 1/26/2024 4:16 PM   Actions taken: Charge Capture section accepted

## 2024-01-27 ENCOUNTER — HOSPITAL ENCOUNTER (INPATIENT)
Facility: HOSPITAL | Age: 89
LOS: 1 days | Discharge: HOME HEALTH | DRG: 291 | End: 2024-01-29
Attending: EMERGENCY MEDICINE | Admitting: INTERNAL MEDICINE
Payer: MEDICARE

## 2024-01-27 ENCOUNTER — APPOINTMENT (OUTPATIENT)
Dept: RADIOLOGY | Facility: HOSPITAL | Age: 89
DRG: 291 | End: 2024-01-27
Payer: MEDICARE

## 2024-01-27 ENCOUNTER — APPOINTMENT (OUTPATIENT)
Dept: CARDIOLOGY | Facility: HOSPITAL | Age: 89
DRG: 291 | End: 2024-01-27
Payer: MEDICARE

## 2024-01-27 DIAGNOSIS — R06.00 DYSPNEA, UNSPECIFIED TYPE: Primary | ICD-10-CM

## 2024-01-27 DIAGNOSIS — I50.43 CHF (CONGESTIVE HEART FAILURE), NYHA CLASS I, ACUTE ON CHRONIC, COMBINED (MULTI): ICD-10-CM

## 2024-01-27 DIAGNOSIS — I50.9 CONGESTIVE HEART FAILURE, UNSPECIFIED HF CHRONICITY, UNSPECIFIED HEART FAILURE TYPE (MULTI): ICD-10-CM

## 2024-01-27 LAB
ALBUMIN SERPL BCP-MCNC: 3.1 G/DL (ref 3.4–5)
ALP SERPL-CCNC: 53 U/L (ref 33–136)
ALT SERPL W P-5'-P-CCNC: 12 U/L (ref 7–45)
ANION GAP BLDV CALCULATED.4IONS-SCNC: 5 MMOL/L (ref 10–25)
ANION GAP SERPL CALC-SCNC: 12 MMOL/L (ref 10–20)
AST SERPL W P-5'-P-CCNC: 19 U/L (ref 9–39)
BACTERIA UR CULT: ABNORMAL
BASE EXCESS BLDV CALC-SCNC: 7.3 MMOL/L (ref -2–3)
BASOPHILS # BLD AUTO: 0.03 X10*3/UL (ref 0–0.1)
BASOPHILS NFR BLD AUTO: 0.6 %
BILIRUB SERPL-MCNC: 0.6 MG/DL (ref 0–1.2)
BNP SERPL-MCNC: 442 PG/ML (ref 0–99)
BODY TEMPERATURE: 37 DEGREES CELSIUS
BUN SERPL-MCNC: 21 MG/DL (ref 6–23)
CA-I BLDV-SCNC: 1.31 MMOL/L (ref 1.1–1.33)
CALCIUM SERPL-MCNC: 9.9 MG/DL (ref 8.6–10.3)
CARDIAC TROPONIN I PNL SERPL HS: 36 NG/L (ref 0–13)
CARDIAC TROPONIN I PNL SERPL HS: 37 NG/L (ref 0–13)
CHLORIDE BLDV-SCNC: 104 MMOL/L (ref 98–107)
CHLORIDE SERPL-SCNC: 103 MMOL/L (ref 98–107)
CO2 SERPL-SCNC: 29 MMOL/L (ref 21–32)
CREAT SERPL-MCNC: 1.08 MG/DL (ref 0.5–1.05)
EGFRCR SERPLBLD CKD-EPI 2021: 48 ML/MIN/1.73M*2
EOSINOPHIL # BLD AUTO: 0.09 X10*3/UL (ref 0–0.4)
EOSINOPHIL NFR BLD AUTO: 1.9 %
ERYTHROCYTE [DISTWIDTH] IN BLOOD BY AUTOMATED COUNT: 16.6 % (ref 11.5–14.5)
FLUAV RNA RESP QL NAA+PROBE: NOT DETECTED
FLUBV RNA RESP QL NAA+PROBE: NOT DETECTED
GLUCOSE BLD MANUAL STRIP-MCNC: 113 MG/DL (ref 74–99)
GLUCOSE BLDV-MCNC: 106 MG/DL (ref 74–99)
GLUCOSE SERPL-MCNC: 107 MG/DL (ref 74–99)
HCO3 BLDV-SCNC: 32 MMOL/L (ref 22–26)
HCT VFR BLD AUTO: 36 % (ref 36–46)
HCT VFR BLD EST: 35 % (ref 36–46)
HGB BLD-MCNC: 11.2 G/DL (ref 12–16)
HGB BLDV-MCNC: 11.6 G/DL (ref 12–16)
IMM GRANULOCYTES # BLD AUTO: 0.02 X10*3/UL (ref 0–0.5)
IMM GRANULOCYTES NFR BLD AUTO: 0.4 % (ref 0–0.9)
INHALED O2 CONCENTRATION: 21 %
INR PPP: 1.5 (ref 0.9–1.1)
LACTATE BLDV-SCNC: 0.9 MMOL/L (ref 0.4–2)
LYMPHOCYTES # BLD AUTO: 1.49 X10*3/UL (ref 0.8–3)
LYMPHOCYTES NFR BLD AUTO: 30.8 %
MAGNESIUM SERPL-MCNC: 2 MG/DL (ref 1.6–2.4)
MCH RBC QN AUTO: 27.4 PG (ref 26–34)
MCHC RBC AUTO-ENTMCNC: 31.1 G/DL (ref 32–36)
MCV RBC AUTO: 88 FL (ref 80–100)
MONOCYTES # BLD AUTO: 0.46 X10*3/UL (ref 0.05–0.8)
MONOCYTES NFR BLD AUTO: 9.5 %
NEUTROPHILS # BLD AUTO: 2.74 X10*3/UL (ref 1.6–5.5)
NEUTROPHILS NFR BLD AUTO: 56.8 %
NRBC BLD-RTO: 0 /100 WBCS (ref 0–0)
OXYHGB MFR BLDV: 67.5 % (ref 45–75)
PCO2 BLDV: 45 MM HG (ref 41–51)
PH BLDV: 7.46 PH (ref 7.33–7.43)
PLATELET # BLD AUTO: 187 X10*3/UL (ref 150–450)
PO2 BLDV: 43 MM HG (ref 35–45)
POTASSIUM BLDV-SCNC: 3.6 MMOL/L (ref 3.5–5.3)
POTASSIUM SERPL-SCNC: 3.6 MMOL/L (ref 3.5–5.3)
PROT SERPL-MCNC: 5.9 G/DL (ref 6.4–8.2)
PROTHROMBIN TIME: 17 SECONDS (ref 9.8–12.8)
RBC # BLD AUTO: 4.09 X10*6/UL (ref 4–5.2)
SAO2 % BLDV: 69 % (ref 45–75)
SARS-COV-2 RNA RESP QL NAA+PROBE: NOT DETECTED
SODIUM BLDV-SCNC: 137 MMOL/L (ref 136–145)
SODIUM SERPL-SCNC: 140 MMOL/L (ref 136–145)
WBC # BLD AUTO: 4.8 X10*3/UL (ref 4.4–11.3)

## 2024-01-27 PROCEDURE — 2500000004 HC RX 250 GENERAL PHARMACY W/ HCPCS (ALT 636 FOR OP/ED)

## 2024-01-27 PROCEDURE — 83735 ASSAY OF MAGNESIUM: CPT

## 2024-01-27 PROCEDURE — 84484 ASSAY OF TROPONIN QUANT: CPT

## 2024-01-27 PROCEDURE — 99222 1ST HOSP IP/OBS MODERATE 55: CPT

## 2024-01-27 PROCEDURE — 96374 THER/PROPH/DIAG INJ IV PUSH: CPT

## 2024-01-27 PROCEDURE — 93005 ELECTROCARDIOGRAM TRACING: CPT

## 2024-01-27 PROCEDURE — 85025 COMPLETE CBC W/AUTO DIFF WBC: CPT

## 2024-01-27 PROCEDURE — 2500000001 HC RX 250 WO HCPCS SELF ADMINISTERED DRUGS (ALT 637 FOR MEDICARE OP)

## 2024-01-27 PROCEDURE — G0378 HOSPITAL OBSERVATION PER HR: HCPCS

## 2024-01-27 PROCEDURE — 84132 ASSAY OF SERUM POTASSIUM: CPT

## 2024-01-27 PROCEDURE — 82947 ASSAY GLUCOSE BLOOD QUANT: CPT

## 2024-01-27 PROCEDURE — 83880 ASSAY OF NATRIURETIC PEPTIDE: CPT

## 2024-01-27 PROCEDURE — 99285 EMERGENCY DEPT VISIT HI MDM: CPT | Performed by: EMERGENCY MEDICINE

## 2024-01-27 PROCEDURE — 71045 X-RAY EXAM CHEST 1 VIEW: CPT | Mod: FOREIGN READ | Performed by: RADIOLOGY

## 2024-01-27 PROCEDURE — 85610 PROTHROMBIN TIME: CPT

## 2024-01-27 PROCEDURE — 71045 X-RAY EXAM CHEST 1 VIEW: CPT | Mod: FR

## 2024-01-27 PROCEDURE — 87636 SARSCOV2 & INF A&B AMP PRB: CPT

## 2024-01-27 PROCEDURE — 2500000004 HC RX 250 GENERAL PHARMACY W/ HCPCS (ALT 636 FOR OP/ED): Performed by: HOSPITALIST

## 2024-01-27 RX ORDER — KETOTIFEN FUMARATE 0.35 MG/ML
1 SOLUTION/ DROPS OPHTHALMIC 2 TIMES DAILY
Status: DISCONTINUED | OUTPATIENT
Start: 2024-01-27 | End: 2024-01-29 | Stop reason: HOSPADM

## 2024-01-27 RX ORDER — ROSUVASTATIN CALCIUM 10 MG/1
5 TABLET, COATED ORAL DAILY
Status: DISCONTINUED | OUTPATIENT
Start: 2024-01-27 | End: 2024-01-29 | Stop reason: HOSPADM

## 2024-01-27 RX ORDER — CHOLECALCIFEROL (VITAMIN D3) 25 MCG
1000 TABLET ORAL DAILY
Status: DISCONTINUED | OUTPATIENT
Start: 2024-01-27 | End: 2024-01-29 | Stop reason: HOSPADM

## 2024-01-27 RX ORDER — HYDROCORTISONE 25 MG/G
CREAM TOPICAL 2 TIMES DAILY
Status: DISCONTINUED | OUTPATIENT
Start: 2024-01-27 | End: 2024-01-29 | Stop reason: HOSPADM

## 2024-01-27 RX ORDER — FUROSEMIDE 10 MG/ML
40 INJECTION INTRAMUSCULAR; INTRAVENOUS ONCE
Status: COMPLETED | OUTPATIENT
Start: 2024-01-27 | End: 2024-01-27

## 2024-01-27 RX ORDER — FERROUS SULFATE, DRIED 160(50) MG
1 TABLET, EXTENDED RELEASE ORAL DAILY
Status: DISCONTINUED | OUTPATIENT
Start: 2024-01-27 | End: 2024-01-29 | Stop reason: HOSPADM

## 2024-01-27 RX ORDER — POLYETHYLENE GLYCOL 3350 17 G/17G
17 POWDER, FOR SOLUTION ORAL DAILY
Status: DISCONTINUED | OUTPATIENT
Start: 2024-01-27 | End: 2024-01-27

## 2024-01-27 RX ORDER — SPIRONOLACTONE 25 MG/1
25 TABLET ORAL DAILY
Status: DISCONTINUED | OUTPATIENT
Start: 2024-01-27 | End: 2024-01-29 | Stop reason: HOSPADM

## 2024-01-27 RX ORDER — POLYETHYLENE GLYCOL 3350 17 G/17G
17 POWDER, FOR SOLUTION ORAL DAILY
Status: DISCONTINUED | OUTPATIENT
Start: 2024-01-27 | End: 2024-01-29 | Stop reason: HOSPADM

## 2024-01-27 RX ORDER — FUROSEMIDE 10 MG/ML
40 INJECTION INTRAMUSCULAR; INTRAVENOUS
Status: DISCONTINUED | OUTPATIENT
Start: 2024-01-27 | End: 2024-01-28

## 2024-01-27 RX ADMIN — FUROSEMIDE 40 MG: 10 INJECTION, SOLUTION INTRAMUSCULAR; INTRAVENOUS at 15:34

## 2024-01-27 RX ADMIN — ROSUVASTATIN 5 MG: 10 TABLET, FILM COATED ORAL at 19:44

## 2024-01-27 RX ADMIN — Medication 1000 UNITS: at 12:13

## 2024-01-27 RX ADMIN — Medication 1 TABLET: at 12:13

## 2024-01-27 RX ADMIN — FUROSEMIDE 40 MG: 10 INJECTION, SOLUTION INTRAMUSCULAR; INTRAVENOUS at 10:56

## 2024-01-27 RX ADMIN — KETOTIFEN FUMARATE 1 DROP: 0.25 SOLUTION/ DROPS OPHTHALMIC at 23:12

## 2024-01-27 RX ADMIN — RIVAROXABAN 15 MG: 15 TABLET, FILM COATED ORAL at 17:36

## 2024-01-27 RX ADMIN — SPIRONOLACTONE 25 MG: 25 TABLET ORAL at 12:13

## 2024-01-27 RX ADMIN — HYDROCORTISONE: 25 CREAM TOPICAL at 23:12

## 2024-01-27 SDOH — SOCIAL STABILITY: SOCIAL INSECURITY: DO YOU FEEL ANYONE HAS EXPLOITED OR TAKEN ADVANTAGE OF YOU FINANCIALLY OR OF YOUR PERSONAL PROPERTY?: NO

## 2024-01-27 SDOH — SOCIAL STABILITY: SOCIAL INSECURITY: HAVE YOU HAD THOUGHTS OF HARMING ANYONE ELSE?: NO

## 2024-01-27 SDOH — SOCIAL STABILITY: SOCIAL INSECURITY: ABUSE: ADULT

## 2024-01-27 SDOH — SOCIAL STABILITY: SOCIAL INSECURITY: DOES ANYONE TRY TO KEEP YOU FROM HAVING/CONTACTING OTHER FRIENDS OR DOING THINGS OUTSIDE YOUR HOME?: NO

## 2024-01-27 SDOH — SOCIAL STABILITY: SOCIAL INSECURITY: ARE YOU OR HAVE YOU BEEN THREATENED OR ABUSED PHYSICALLY, EMOTIONALLY, OR SEXUALLY BY ANYONE?: NO

## 2024-01-27 SDOH — SOCIAL STABILITY: SOCIAL INSECURITY: WERE YOU ABLE TO COMPLETE ALL THE BEHAVIORAL HEALTH SCREENINGS?: YES

## 2024-01-27 SDOH — SOCIAL STABILITY: SOCIAL INSECURITY: HAS ANYONE EVER THREATENED TO HURT YOUR FAMILY OR YOUR PETS?: NO

## 2024-01-27 SDOH — SOCIAL STABILITY: SOCIAL INSECURITY: ARE THERE ANY APPARENT SIGNS OF INJURIES/BEHAVIORS THAT COULD BE RELATED TO ABUSE/NEGLECT?: NO

## 2024-01-27 SDOH — SOCIAL STABILITY: SOCIAL INSECURITY: DO YOU FEEL UNSAFE GOING BACK TO THE PLACE WHERE YOU ARE LIVING?: NO

## 2024-01-27 ASSESSMENT — ENCOUNTER SYMPTOMS
HEMATOLOGIC/LYMPHATIC NEGATIVE: 1
GASTROINTESTINAL NEGATIVE: 1
ALLERGIC/IMMUNOLOGIC NEGATIVE: 1
NEUROLOGICAL NEGATIVE: 1
CHANGE IN APPETITE: UNCHANGED
PSYCHIATRIC NEGATIVE: 1
APPETITE LEVEL: GOOD
ENDOCRINE NEGATIVE: 1
RESPIRATORY NEGATIVE: 1
EYES NEGATIVE: 1
CONSTITUTIONAL NEGATIVE: 1
MUSCULOSKELETAL NEGATIVE: 1

## 2024-01-27 ASSESSMENT — COLUMBIA-SUICIDE SEVERITY RATING SCALE - C-SSRS
2. HAVE YOU ACTUALLY HAD ANY THOUGHTS OF KILLING YOURSELF?: NO
6. HAVE YOU EVER DONE ANYTHING, STARTED TO DO ANYTHING, OR PREPARED TO DO ANYTHING TO END YOUR LIFE?: NO
1. IN THE PAST MONTH, HAVE YOU WISHED YOU WERE DEAD OR WISHED YOU COULD GO TO SLEEP AND NOT WAKE UP?: NO

## 2024-01-27 ASSESSMENT — ACTIVITIES OF DAILY LIVING (ADL)
WALKS IN HOME: NEEDS ASSISTANCE
HEARING - LEFT EAR: FUNCTIONAL
DRESSING YOURSELF: NEEDS ASSISTANCE
LACK_OF_TRANSPORTATION: NO
PATIENT'S MEMORY ADEQUATE TO SAFELY COMPLETE DAILY ACTIVITIES?: NO
TOILETING: INDEPENDENT
JUDGMENT_ADEQUATE_SAFELY_COMPLETE_DAILY_ACTIVITIES: YES
BATHING: INDEPENDENT
HEARING - RIGHT EAR: FUNCTIONAL
ASSISTIVE_DEVICE: WALKER
FEEDING YOURSELF: INDEPENDENT
GROOMING: INDEPENDENT
ADEQUATE_TO_COMPLETE_ADL: YES

## 2024-01-27 ASSESSMENT — COGNITIVE AND FUNCTIONAL STATUS - GENERAL
DRESSING REGULAR LOWER BODY CLOTHING: A LITTLE
MOBILITY SCORE: 20
WALKING IN HOSPITAL ROOM: A LITTLE
HELP NEEDED FOR BATHING: A LITTLE
STANDING UP FROM CHAIR USING ARMS: A LITTLE
DAILY ACTIVITIY SCORE: 21
PATIENT BASELINE BEDBOUND: NO
DRESSING REGULAR UPPER BODY CLOTHING: A LITTLE
CLIMB 3 TO 5 STEPS WITH RAILING: A LOT

## 2024-01-27 ASSESSMENT — LIFESTYLE VARIABLES
PRESCIPTION_ABUSE_PAST_12_MONTHS: NO
AUDIT-C TOTAL SCORE: 0
HOW OFTEN DO YOU HAVE 6 OR MORE DRINKS ON ONE OCCASION: NEVER
AUDIT-C TOTAL SCORE: 0
HOW MANY STANDARD DRINKS CONTAINING ALCOHOL DO YOU HAVE ON A TYPICAL DAY: PATIENT DOES NOT DRINK
HOW OFTEN DO YOU HAVE A DRINK CONTAINING ALCOHOL: NEVER
SKIP TO QUESTIONS 9-10: 1
SUBSTANCE_ABUSE_PAST_12_MONTHS: NO

## 2024-01-27 ASSESSMENT — PATIENT HEALTH QUESTIONNAIRE - PHQ9
1. LITTLE INTEREST OR PLEASURE IN DOING THINGS: NOT AT ALL
SUM OF ALL RESPONSES TO PHQ9 QUESTIONS 1 & 2: 0
2. FEELING DOWN, DEPRESSED OR HOPELESS: NOT AT ALL

## 2024-01-27 ASSESSMENT — PAIN SCALES - GENERAL: PAINLEVEL_OUTOF10: 4

## 2024-01-27 ASSESSMENT — PAIN DESCRIPTION - DESCRIPTORS: DESCRIPTORS: DISCOMFORT

## 2024-01-27 ASSESSMENT — VISUAL ACUITY: OU: 1

## 2024-01-27 NOTE — H&P
"History Of Present Illness  Aicha Lord is a 89 y.o. female presenting with shortness of breath and chest pain.    Patient is a 89 yo female with a past medical history of Afib on xarelto, HTN, DM2, HLD, CKD, hyperparathyroidism, osteoporosis, chronic pain syndrome who presents to ED for SOB and CP. She went to bed last night without any complaints but woke in the middle of the night and called her daughter stating \"I can't breath\". Upon daughters arrival, the patients SOB had subsided and she was able to fall back to sleep. A few hours later, the patient woke again stating \" I can't breath, call 911\". Patient reports associated mild aching in the left anterior chest. The pain does not radiate into the back, flanks, shoulders, neck, jaw, or upper extremities.  She was placed on 2 L oxygen nasal cannula by EMS for comfort, but was not hypoxic. She states that she has been clearing her throat, but denies a cough. Denies fevers or chills, hemoptysis, pleuritic pain, palpitations. She denies any lower extremity swelling or pain, numbness, tingling, or weakness.  Denies abdominal pain, no nausea or vomiting, no constipation or diarrhea. Denies dizziness and or lightheadedness. She is currently wearing a Zio patch ( was ordered last admission by cardiology for bradycardia)    Of note, had recent admission which revealed Acute diverticulitis and bacteremia with E. coli and Pseudomonas.     ED work up reveals: Vital signs stable, 94% on room air. Labs show no leukocytosis, hemoglobin 11.2, VBG shows pH of 7.46, bicarb 32.  GFR and creatinine at baseline.  No significant electrolyte abnormalities.  Normal liver function. troponin mildly elevated at 37, second reading 36. EKG is nonischemic.  BNP is elevated at 442.  Received 40 mg Lasix IV.  Chest x-ray shows increased pulmonary vascularity and hazy opacities similar to prior, worse in the left base.      Past Medical History  She has a past medical history of Abdominal " pain of multiple sites (04/11/2023), Age-related nuclear cataract, left (04/11/2023), Age-related nuclear cataract, right (04/11/2023), Anemia (04/11/2023), Appetite loss (04/11/2023), Arrhythmia (04/11/2023), Change in bowel movement (04/11/2023), Cramp and spasm (10/05/2016), Dizziness and giddiness (02/25/2019), Edema (04/11/2023), Essential (primary) hypertension (12/08/2022), Hyperglycemia (04/11/2023), Hyperlipidemia, unspecified (12/08/2022), Hypermetropia, bilateral (01/24/2018), Iron deficiency anemia, unspecified (02/28/2019), Long term (current) use of antibiotics (05/06/2014), Longstanding persistent atrial fibrillation (CMS/HCC) (10/23/2023), Macular degeneration, 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), Pain in unspecified hand (08/07/2014), Pain of left hand (04/11/2023), Palpitations (06/08/2023), Personal history of other diseases of the circulatory system, Personal history of other diseases of the digestive system (07/25/2016), Personal history of other diseases of the musculoskeletal system and connective tissue (07/01/2014), Personal history of other diseases of the respiratory system (04/01/2014), Personal history of other drug therapy (10/29/2018), Personal history of other endocrine, nutritional and metabolic disease, Personal history of other endocrine, nutritional and metabolic disease (10/02/2015), Personal history of other endocrine, nutritional and metabolic disease (01/31/2014), Personal history of other specified conditions (02/05/2019), Personal history of other specified conditions (02/28/2019), Personal history of pulmonary embolism (12/27/2019), Pneumonia, unspecified organism (01/30/2014), Prediabetes (04/30/2018), Subluxation of left index finger (04/11/2023), Type 2 diabetes mellitus without complications (CMS/HCC) (02/25/2019), Unspecified atrial fibrillation (CMS/[facility]) (12/08/2022), Unspecified cataract, and Unspecified epiphora, unspecified side  (07/25/2018).    Surgical History  She has a past surgical history that includes Other surgical history (10/29/2013); Cataract extraction (04/17/2018); Breast lumpectomy (06/13/2017); Foot surgery (01/30/2014); Total knee arthroplasty (01/30/2014); Mastectomy (01/30/2014); US guided thyroid biopsy (8/21/2014); and IR CVC tunneled (1/8/2024).     Social History  She reports that she has never smoked. She has never used smokeless tobacco. She reports that she does not currently use alcohol. She reports that she does not currently use drugs.    Family History  Family History   Problem Relation Name Age of Onset    Other (cardiac disorder) Father      Hypertension Father      Hodgkin's lymphoma Brother      Hypertension Paternal Grandmother      Hypertension Paternal Grandfather      Hodgkin's lymphoma Other family history         Allergies  Celecoxib, Ciprofloxacin, Penicillins, Promethazine, Statins-hmg-coa reductase inhibitors, Sulfamethoxazole-trimethoprim, and Tramadol    Review of Systems   Constitutional: Negative.    HENT: Negative.     Eyes: Negative.    Respiratory: Negative.     Cardiovascular:  Positive for chest pain.   Gastrointestinal: Negative.    Endocrine: Negative.    Genitourinary: Negative.    Musculoskeletal: Negative.    Skin: Negative.    Allergic/Immunologic: Negative.    Neurological: Negative.    Hematological: Negative.    Psychiatric/Behavioral: Negative.     All other systems reviewed and are negative.       Physical Exam  Vitals and nursing note reviewed.   Constitutional:       Appearance: Normal appearance.   HENT:      Head: Normocephalic.      Nose: Nose normal.      Mouth/Throat:      Lips: Pink.      Mouth: Mucous membranes are moist.      Pharynx: Oropharynx is clear.   Eyes:      General: Lids are normal. Lids are everted, no foreign bodies appreciated. Vision grossly intact. Gaze aligned appropriately.      Extraocular Movements: Extraocular movements intact.       Conjunctiva/sclera: Conjunctivae normal.   Neck:      Trachea: Trachea normal.   Cardiovascular:      Rate and Rhythm: Bradycardia present. Rhythm irregular.      Pulses: Normal pulses.      Heart sounds: Normal heart sounds.   Pulmonary:      Effort: Pulmonary effort is normal.      Breath sounds: Normal breath sounds.      Comments: Shortness of breath  Abdominal:      General: Abdomen is flat. Bowel sounds are normal.      Palpations: Abdomen is soft.   Musculoskeletal:         General: Normal range of motion.      Cervical back: Full passive range of motion without pain, normal range of motion and neck supple.      Right lower leg: Edema present.      Left lower leg: Edema present.   Feet:      Right foot:      Skin integrity: Skin integrity normal.      Left foot:      Skin integrity: Skin integrity normal.   Skin:     General: Skin is warm and dry.      Capillary Refill: Capillary refill takes less than 2 seconds.   Neurological:      General: No focal deficit present.      Mental Status: She is alert and oriented to person, place, and time. Mental status is at baseline.   Psychiatric:         Attention and Perception: Attention and perception normal.         Mood and Affect: Mood and affect normal.         Speech: Speech normal.         Behavior: Behavior normal. Behavior is cooperative.         Thought Content: Thought content normal.         Cognition and Memory: Cognition normal.         Judgment: Judgment normal.       Last Recorded Vitals  /57   Pulse 60   Resp 16   SpO2 94%     Scheduled medications  calcium carbonate-vitamin D3, 1 tablet, oral, Daily  cholecalciferol, 1,000 Units, oral, Daily  furosemide, 40 mg, intravenous, BID  hydrocortisone, , Topical, BID  ketotifen, 1 drop, Both Eyes, BID  polyethylene glycol, 17 g, oral, Daily  rivaroxaban, 15 mg, oral, Daily with evening meal  rosuvastatin, 5 mg, oral, Daily  spironolactone, 25 mg, oral, Daily      Results for orders placed or  performed during the hospital encounter of 01/27/24 (from the past 24 hour(s))   CBC and Auto Differential   Result Value Ref Range    WBC 4.8 4.4 - 11.3 x10*3/uL    nRBC 0.0 0.0 - 0.0 /100 WBCs    RBC 4.09 4.00 - 5.20 x10*6/uL    Hemoglobin 11.2 (L) 12.0 - 16.0 g/dL    Hematocrit 36.0 36.0 - 46.0 %    MCV 88 80 - 100 fL    MCH 27.4 26.0 - 34.0 pg    MCHC 31.1 (L) 32.0 - 36.0 g/dL    RDW 16.6 (H) 11.5 - 14.5 %    Platelets 187 150 - 450 x10*3/uL    Neutrophils % 56.8 40.0 - 80.0 %    Immature Granulocytes %, Automated 0.4 0.0 - 0.9 %    Lymphocytes % 30.8 13.0 - 44.0 %    Monocytes % 9.5 2.0 - 10.0 %    Eosinophils % 1.9 0.0 - 6.0 %    Basophils % 0.6 0.0 - 2.0 %    Neutrophils Absolute 2.74 1.60 - 5.50 x10*3/uL    Immature Granulocytes Absolute, Automated 0.02 0.00 - 0.50 x10*3/uL    Lymphocytes Absolute 1.49 0.80 - 3.00 x10*3/uL    Monocytes Absolute 0.46 0.05 - 0.80 x10*3/uL    Eosinophils Absolute 0.09 0.00 - 0.40 x10*3/uL    Basophils Absolute 0.03 0.00 - 0.10 x10*3/uL   Comprehensive Metabolic Panel   Result Value Ref Range    Glucose 107 (H) 74 - 99 mg/dL    Sodium 140 136 - 145 mmol/L    Potassium 3.6 3.5 - 5.3 mmol/L    Chloride 103 98 - 107 mmol/L    Bicarbonate 29 21 - 32 mmol/L    Anion Gap 12 10 - 20 mmol/L    Urea Nitrogen 21 6 - 23 mg/dL    Creatinine 1.08 (H) 0.50 - 1.05 mg/dL    eGFR 48 (L) >60 mL/min/1.73m*2    Calcium 9.9 8.6 - 10.3 mg/dL    Albumin 3.1 (L) 3.4 - 5.0 g/dL    Alkaline Phosphatase 53 33 - 136 U/L    Total Protein 5.9 (L) 6.4 - 8.2 g/dL    AST 19 9 - 39 U/L    Bilirubin, Total 0.6 0.0 - 1.2 mg/dL    ALT 12 7 - 45 U/L   Magnesium   Result Value Ref Range    Magnesium 2.00 1.60 - 2.40 mg/dL   B-Type Natriuretic Peptide   Result Value Ref Range     (H) 0 - 99 pg/mL   Blood Gas Venous Full Panel   Result Value Ref Range    POCT pH, Venous 7.46 (H) 7.33 - 7.43 pH    POCT pCO2, Venous 45 41 - 51 mm Hg    POCT pO2, Venous 43 35 - 45 mm Hg    POCT SO2, Venous 69 45 - 75 %    POCT Oxy  Hemoglobin, Venous 67.5 45.0 - 75.0 %    POCT Hematocrit Calculated, Venous 35.0 (L) 36.0 - 46.0 %    POCT Sodium, Venous 137 136 - 145 mmol/L    POCT Potassium, Venous 3.6 3.5 - 5.3 mmol/L    POCT Chloride, Venous 104 98 - 107 mmol/L    POCT Ionized Calicum, Venous 1.31 1.10 - 1.33 mmol/L    POCT Glucose, Venous 106 (H) 74 - 99 mg/dL    POCT Lactate, Venous 0.9 0.4 - 2.0 mmol/L    POCT Base Excess, Venous 7.3 (H) -2.0 - 3.0 mmol/L    POCT HCO3 Calculated, Venous 32.0 (H) 22.0 - 26.0 mmol/L    POCT Hemoglobin, Venous 11.6 (L) 12.0 - 16.0 g/dL    POCT Anion Gap, Venous 5.0 (L) 10.0 - 25.0 mmol/L    Patient Temperature 37.0 degrees Celsius    FiO2 21 %   Troponin I, High Sensitivity, Initial   Result Value Ref Range    Troponin I, High Sensitivity 37 (H) 0 - 13 ng/L   Protime-INR   Result Value Ref Range    Protime 17.0 (H) 9.8 - 12.8 seconds    INR 1.5 (H) 0.9 - 1.1   Sars-CoV-2 and Influenza A/B PCR   Result Value Ref Range    Flu A Result Not Detected Not Detected    Flu B Result Not Detected Not Detected    Coronavirus 2019, PCR Not Detected Not Detected   Troponin, High Sensitivity, 1 Hour   Result Value Ref Range    Troponin I, High Sensitivity 36 (H) 0 - 13 ng/L     XR chest 1 view    Result Date: 1/27/2024  STUDY: Chest Radiograph;  01/27/2024 7:38 AM INDICATION: Chest pain and dyspnea. COMPARISON: 1/6/2024 XR Chest. ACCESSION NUMBER(S): [id number] ORDERING CLINICIAN: JERSEY EMMANUEL TECHNIQUE:  Frontal chest was obtained at 8:38 hours. FINDINGS: CARDIOMEDIASTINAL SILHOUETTE: Heart is enlarged with increased pulmonary vascularity.  LUNGS: Hazy opacities again noted bilaterally, worst in the left base with small pleural effusions  ABDOMEN: No remarkable upper abdominal findings.  BONES: No acute osseous changes.    Increased pulmonary vascularity. Hazy opacities again noted bilaterally, worst in the left base with small pleural effusions. Signed by Dale Corral MD      Assessment/Plan   Principal Problem:     CHF (congestive heart failure), NYHA class I, acute on chronic, combined (CMS/HCC)    Acute on chronic heart failure   - presented with shortness of breath and chest pain  - Bilateral lower extremity swelling noted  - Chest x-ray shows increased pulmonary vascularity and hazy opacities similar to prior, worse in the left base.  - Recent ECHO 1/5/2024 EF 55-60%  -   - troponins 37/36 (likely demand ischemia d/t CHF)  - 40mg IV lasix x 1 in ED  - Continue Lasix 40mg IV BID  - On 2L NC for comfort, not hypoxia    Atrial fibrillation  - EKG: afib with slow ventricular response HR 58  - slow rates on monitor (40-50's, but does dip in upper 30s)  - reviewed previous admission chart: recent admission beta blocker held d/t low rates, was seen by cardiology   - was discharged on event monitor- currently has it on chest wall ( last day of monitor reading is today)  - continue Xarelto     Hypertension  - stable  - continue home medications    Diabetes mellitus  - hypoglycemia protocol/ diabetic diet    Chronic kidney disease stage III    Dyslipidemia   - continue rosuvastatin     Interdisciplinary rounding completed with Attending Provider, Bedside RN and TCC.  Labs/ results/ plan of care dicussed and reviewed with Dr. Mehdi Stoner, APRN-CNP

## 2024-01-27 NOTE — PROGRESS NOTES
Pharmacy Medication History Review    Aicha Lord is a 89 y.o. female admitted for CHF (congestive heart failure), NYHA class I, acute on chronic, combined (CMS/[facility]). Pharmacy reviewed the patient's [id number] medications and allergies for accuracy.    The list below reflectives the updated PTA list. Please review each medication in order reconciliation for additional clarification and justification.  Prior to Admission Medications   Prescriptions Last Dose      Xarelto 15 mg tablet 1/26/2024      Sig: Take 1 tablet (15 mg) by mouth once daily in the evening. Take with meals.   acetaminophen (Tylenol) 500 mg tablet Past Week      Sig: Take 2 tablets (1,000 mg) by mouth once daily as needed for mild pain (1 - 3).   calcium carbonate-vitamin D3 600 mg-10 mcg (400 unit) capsule 1/26/2024      Sig: Take 1 tablet by mouth once daily.   cholecalciferol (Vitamin D-3) 25 MCG (1000 UT) capsule 1/26/2024      Sig: Take 1 capsule (25 mcg) by mouth once daily. TAKE AS DIRECTED.   fluocinolone (Synalar) 0.01 % external solution Past Week      Sig: Apply 1 Application topically 2 times a day.   furosemide (Lasix) 40 mg tablet 1/26/2024      Sig: TAKE 1 TABLET TWICE A DAY BY  MOUTH   ketotifen (Zaditor) 0.025 % (0.035 %) ophthalmic solution 1/26/2024      Sig: Administer into affected eye(s). 1 drop in both eyes 2 times a day as needed for itching/allergies             rosuvastatin (Crestor) 5 mg tablet 1/26/2024      Sig: Take 1 tablet (5 mg) by mouth once daily.   spironolactone (Aldactone) 25 mg tablet 1/26/2024      Sig: Take 1 tablet (25 mg) by mouth once daily.   vit A,C and E-lutein-minerals (Ocuvite with Lutein) 300 mcg-200 mg-27 mg-2 mg tablet 1/26/2024  Yes No   Sig: Take 1 tablet by mouth once daily.      Facility-Administered Medications: None      Allergies  Reviewed by Love Chew PA-C on 1/27/2024        Severity Reactions Comments    Celecoxib Not Specified Unknown     Ciprofloxacin Not Specified  Unknown     Penicillins Not Specified Unknown     Promethazine Not Specified Unknown     Statins-hmg-coa Reductase Inhibitors Not Specified Unknown     Sulfamethoxazole-trimethoprim Not Specified Unknown     Tramadol Not Specified Unknown             Below are additional concerns with the patient's PTA list.  Patient's family at bedside verified all medications and doses from patient's  MYCHART.    Vanessa Hector CPhT

## 2024-01-27 NOTE — ED PROCEDURE NOTE
Procedure  Procedures    There was difficulty obtaining IV access on this patient, and multiple attempts by nursing staff and/or medics have failed. Patient required IV access by ED provider under the assistance of ultrasound.      Site was prepped and sterilized before IV insertion.     Ultrasound images were not saved in the patient's chart demonstrating cannulization.     IV Size:20  IV Side: Left  IV Site: Forearm    DO Rohini Tellez DO  Resident  01/27/24 [id number]

## 2024-01-27 NOTE — ED PROVIDER NOTES
HPI   No chief complaint on file.      HPI  HISTORY OF PRESENT ILLNESS:  89 y.o. female presenting to the ED via EMS with complaint of shortness of breath that began last night.  She states that she has felt short of breath and had difficulty catching her breath throughout the night.  States it is hard to lay flat due to shortness of breath.  She reports some mild aching left anterior chest pain which started this morning as well.  Does not radiate into the back, flanks, shoulders, neck, jaw, or upper extremities.  She was placed on 2 L oxygen nasal cannula by EMS for comfort, but was not hypoxic.  She states that she has been clearing her throat, but denies a cough.  No hemoptysis.  No pleuritic pain.  No palpitations.  States that she is always in A-fib.  Currently wearing a Zio patch.  She denies any lower extremity swelling or pain.  No extremity numbness, tingling, or weakness.  Denies abdominal pain, no nausea or vomiting, no constipation or diarrhea.  No dizziness lightheadedness.  Denies fevers or chills.  Patient was recently admitted for diverticulitis and Pseudomonas and E. coli bacteremia.  She states that she has felt well and recovered since her discharge.  No other complaints or symptoms voiced.    PMH: Afib on xarelto, HTN, DM2, HLD, CKD, hyperparathyroidism, osteoporosis, chronic pain syndrome  Family history: noncontributory  Social history: non smoker, no ETOH, no illicit substances    12 point review of systems was performed and is negative unless otherwise specified in HPI.        No data recorded                Patient History   Past Medical History:   Diagnosis Date    Abdominal pain of multiple sites 04/11/2023    Age-related nuclear cataract, left 04/11/2023    Age-related nuclear cataract, right 04/11/2023    Anemia 04/11/2023    Appetite loss 04/11/2023    Arrhythmia 04/11/2023    Change in bowel movement 04/11/2023    Cramp and spasm 10/05/2016    Cramps of lower extremity    Dizziness  and giddiness 02/25/2019    Postural dizziness with presyncope    Edema 04/11/2023    Essential (primary) hypertension 12/08/2022    Hypertension    Hyperglycemia 04/11/2023    Hyperlipidemia, unspecified 12/08/2022    Hyperlipidemia    Hypermetropia, bilateral 01/24/2018    Hyperopia of both eyes with astigmatism and presbyopia    Iron deficiency anemia, unspecified 02/28/2019    Microcytic anemia    Long term (current) use of antibiotics 05/06/2014    Prophylactic antibiotic    Longstanding persistent atrial fibrillation (CMS/HCC) 10/23/2023    Macular degeneration     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 03/28/2019    IPMN (intraductal papillary mucinous neoplasm)    Pain in unspecified hand 08/07/2014    Pain in hand    Pain of left hand 04/11/2023    Palpitations 06/08/2023    Personal history of other diseases of the circulatory system     History of congestive heart failure    Personal history of other diseases of the digestive system 07/25/2016    History of diverticulitis of colon    Personal history of other diseases of the musculoskeletal system and connective tissue 07/01/2014    History of osteopenia    Personal history of other diseases of the respiratory system 04/01/2014    History of acute bronchitis    Personal history of other drug therapy 10/29/2018    History of pneumococcal vaccination    Personal history of other endocrine, nutritional and metabolic disease     History of type 2 diabetes mellitus    Personal history of other endocrine, nutritional and metabolic disease 10/02/2015    History of hyperglycemia    Personal history of other endocrine, nutritional and metabolic disease 01/31/2014    History of hypoparathyroidism    Personal history of other specified conditions 02/05/2019    History of shortness of breath    Personal history of other specified conditions 02/28/2019    History of fatigue    Personal history of pulmonary embolism 12/27/2019    History of pulmonary  embolism    Pneumonia, unspecified organism 01/30/2014    Community acquired pneumonia    Prediabetes 04/30/2018    Prediabetes    Subluxation of left index finger 04/11/2023    Type 2 diabetes mellitus without complications (CMS/[facility]) 02/25/2019    Type 2 diabetes mellitus without complication    Unspecified atrial fibrillation (CMS/[facility]) 12/08/2022    Atrial fibrillation    Unspecified cataract     Cataracts, both eyes    Unspecified epiphora, unspecified side 07/25/2018    Eye tearing     Past Surgical History:   Procedure Laterality Date    BREAST LUMPECTOMY  06/13/2017    Breast Surgery Lumpectomy    CATARACT EXTRACTION  04/17/2018    Cataract Surgery    FOOT SURGERY  01/30/2014    Foot Surgery    IR CVC TUNNELED  1/8/2024    IR CVC TUNNELED 1/8/2024 [facility]    MASTECTOMY  01/30/2014    Breast Surgery Mastectomy    OTHER SURGICAL HISTORY  10/29/2013    Wrist Surgery    TOTAL KNEE ARTHROPLASTY  01/30/2014    Knee Replacement    US GUIDED THYROID BIOPSY  8/21/2014    US GUIDED THYROID BIOPSY 8/21/2014 [facility]     Family History   Problem Relation Name Age of Onset    Other (cardiac disorder) Father      Hypertension Father      Hodgkin's lymphoma Brother      Hypertension Paternal Grandmother      Hypertension Paternal Grandfather      Hodgkin's lymphoma Other family history      Social History     Tobacco Use    Smoking status: Never    Smokeless tobacco: Never   Substance Use Topics    Alcohol use: Not Currently    Drug use: Not Currently       Physical Exam   ED Triage Vitals   Temp Pulse Resp BP   -- -- -- --      SpO2 Temp src Heart Rate Source Patient Position   -- -- -- --      BP Location FiO2 (%)     -- --       Physical Exam  Constitutional:       General: She is not in acute distress.     Appearance: She is not ill-appearing, toxic-appearing or diaphoretic.   HENT:      Head: Normocephalic and atraumatic.      Nose: No congestion or rhinorrhea.      Mouth/Throat:      Mouth: Mucous  membranes are moist.   Eyes:      General: No scleral icterus.     Extraocular Movements: Extraocular movements intact.      Conjunctiva/sclera: Conjunctivae normal.      Pupils: Pupils are equal, round, and reactive to light.   Cardiovascular:      Rate and Rhythm: Normal rate.      Pulses: Normal pulses.   Pulmonary:      Effort: Pulmonary effort is normal. No respiratory distress.      Breath sounds: Rales present. No wheezing or rhonchi.      Comments: Faint rales in lung bases.  Abdominal:      General: There is no distension.      Palpations: Abdomen is soft.      Tenderness: There is no abdominal tenderness. There is no guarding.   Musculoskeletal:         General: No tenderness. Normal range of motion.      Cervical back: Normal range of motion and neck supple. No rigidity.   Lymphadenopathy:      Cervical: No cervical adenopathy.   Skin:     General: Skin is warm and dry.      Capillary Refill: Capillary refill takes less than 2 seconds.   Neurological:      General: No focal deficit present.      Mental Status: She is alert and oriented to person, place, and time.      Cranial Nerves: No cranial nerve deficit.      Sensory: No sensory deficit.      Coordination: Coordination normal.   Psychiatric:         Mood and Affect: Mood normal.         Behavior: Behavior normal.         Judgment: Judgment normal.       ED Course & MDM   ED Course as of 01/27/24 0938   Sat Jan 27, 2024   0758 07:55 12 lead EKG interpreted by myself and my ED attending reveals atrial fibrillation with slow ventricular response with a rate of 57 beats per minute.  Normal Axis.  There are no ST elevations or T wave inversions. No acute ischemic changes identified.  [EH]      ED Course User Index  [EH] Love Chew PA-C         Diagnoses as of 01/27/24 0938   Dyspnea, unspecified type   Congestive heart failure, unspecified HF chronicity, unspecified heart failure type (CMS/HCC)       Medical Decision Making  ED course / MDM      Summary:  Patient presented with shortness of breath that began last night.  Mild vague left-sided chest pain.  Vital signs stable, patient appears nontoxic.  94% on room air.  Nonlabored respirations, no respiratory distress.  Faint crackles in the lower lobes.  Heart is regular rate and rhythm.  There is mild lower extremity edema, per patient at baseline, no tenderness.  No recent travel, immobilization, or surgery, she has no pleuritic pain, no tachycardia, no hemoptysis, and is on Xarelto, doubt PE.  Cardiac workup initiated.  IV established, labs drawn.  Labs show no leukocytosis, hemoglobin 11.2, VBG shows pH of 7.46, bicarb 32.  GFR and creatinine at baseline.  No significant electrolyte abnormalities.  Normal liver function.  Initial troponin mildly elevated at 37, delta troponin is pending.  EKG is nonischemic.  Doubt ACS or MI.  BNP is elevated at 442.  Given 40 mg Lasix IV.  Chest x-ray shows increased pulmonary vascularity and hazy opacities similar to prior, worse in the left base.  Patient case discussed with ED attending Dr. Beal, who also saw and evaluated the patient. Results and differential were discussed in detail with the patient.  Dyspnea with minimal chest pain, elevated BNP, chest x-ray with increased pulmonary vascularity, appears most consistent with CHF.  Discussed management options with the patient, recommending admission, and she is in agreement this plan.  Accepted for observation.    Impression:  1. See diagnosis     Disposition: Admission    Patient seen and discussed with Dr. Beal    This note has been transcribed using voice recognition and may contain grammatical errors, misplaced words, incorrect words, incorrect phrases or other errors.   Procedure  Procedures     Love Chew PA-C  01/27/24 0971

## 2024-01-28 PROBLEM — R06.00 DYSPNEA, UNSPECIFIED TYPE: Status: ACTIVE | Noted: 2024-01-28

## 2024-01-28 LAB
ALBUMIN SERPL BCP-MCNC: 3 G/DL (ref 3.4–5)
ALP SERPL-CCNC: 49 U/L (ref 33–136)
ALT SERPL W P-5'-P-CCNC: 12 U/L (ref 7–45)
ANION GAP SERPL CALC-SCNC: 10 MMOL/L (ref 10–20)
AST SERPL W P-5'-P-CCNC: 23 U/L (ref 9–39)
BILIRUB SERPL-MCNC: 0.7 MG/DL (ref 0–1.2)
BUN SERPL-MCNC: 20 MG/DL (ref 6–23)
CALCIUM SERPL-MCNC: 9.6 MG/DL (ref 8.6–10.3)
CHLORIDE SERPL-SCNC: 102 MMOL/L (ref 98–107)
CO2 SERPL-SCNC: 31 MMOL/L (ref 21–32)
CREAT SERPL-MCNC: 1.05 MG/DL (ref 0.5–1.05)
EGFRCR SERPLBLD CKD-EPI 2021: 50 ML/MIN/1.73M*2
ERYTHROCYTE [DISTWIDTH] IN BLOOD BY AUTOMATED COUNT: 16.7 % (ref 11.5–14.5)
GLUCOSE BLD MANUAL STRIP-MCNC: 130 MG/DL (ref 74–99)
GLUCOSE BLD MANUAL STRIP-MCNC: 146 MG/DL (ref 74–99)
GLUCOSE BLD MANUAL STRIP-MCNC: 185 MG/DL (ref 74–99)
GLUCOSE BLD MANUAL STRIP-MCNC: 73 MG/DL (ref 74–99)
GLUCOSE SERPL-MCNC: 88 MG/DL (ref 74–99)
HCT VFR BLD AUTO: 36.3 % (ref 36–46)
HGB BLD-MCNC: 11.1 G/DL (ref 12–16)
MCH RBC QN AUTO: 27.4 PG (ref 26–34)
MCHC RBC AUTO-ENTMCNC: 30.6 G/DL (ref 32–36)
MCV RBC AUTO: 90 FL (ref 80–100)
NRBC BLD-RTO: 0 /100 WBCS (ref 0–0)
PLATELET # BLD AUTO: 186 X10*3/UL (ref 150–450)
POTASSIUM SERPL-SCNC: 4 MMOL/L (ref 3.5–5.3)
PROT SERPL-MCNC: 5.7 G/DL (ref 6.4–8.2)
RBC # BLD AUTO: 4.05 X10*6/UL (ref 4–5.2)
SODIUM SERPL-SCNC: 139 MMOL/L (ref 136–145)
WBC # BLD AUTO: 5.4 X10*3/UL (ref 4.4–11.3)

## 2024-01-28 PROCEDURE — 2500000004 HC RX 250 GENERAL PHARMACY W/ HCPCS (ALT 636 FOR OP/ED): Performed by: HOSPITALIST

## 2024-01-28 PROCEDURE — 84075 ASSAY ALKALINE PHOSPHATASE: CPT

## 2024-01-28 PROCEDURE — 99233 SBSQ HOSP IP/OBS HIGH 50: CPT | Performed by: NURSE PRACTITIONER

## 2024-01-28 PROCEDURE — 82947 ASSAY GLUCOSE BLOOD QUANT: CPT

## 2024-01-28 PROCEDURE — 2500000004 HC RX 250 GENERAL PHARMACY W/ HCPCS (ALT 636 FOR OP/ED): Performed by: NURSE PRACTITIONER

## 2024-01-28 PROCEDURE — 85027 COMPLETE CBC AUTOMATED: CPT

## 2024-01-28 PROCEDURE — 1200000002 HC GENERAL ROOM WITH TELEMETRY DAILY

## 2024-01-28 PROCEDURE — 2500000001 HC RX 250 WO HCPCS SELF ADMINISTERED DRUGS (ALT 637 FOR MEDICARE OP)

## 2024-01-28 PROCEDURE — 2500000004 HC RX 250 GENERAL PHARMACY W/ HCPCS (ALT 636 FOR OP/ED)

## 2024-01-28 PROCEDURE — 2500000004 HC RX 250 GENERAL PHARMACY W/ HCPCS (ALT 636 FOR OP/ED): Performed by: INTERNAL MEDICINE

## 2024-01-28 RX ORDER — FUROSEMIDE 10 MG/ML
80 INJECTION INTRAMUSCULAR; INTRAVENOUS EVERY 12 HOURS
Status: DISCONTINUED | OUTPATIENT
Start: 2024-01-29 | End: 2024-01-29

## 2024-01-28 RX ORDER — ACETAMINOPHEN 325 MG/1
650 TABLET ORAL EVERY 6 HOURS PRN
Status: DISCONTINUED | OUTPATIENT
Start: 2024-01-28 | End: 2024-01-29 | Stop reason: HOSPADM

## 2024-01-28 RX ORDER — FUROSEMIDE 10 MG/ML
60 INJECTION INTRAMUSCULAR; INTRAVENOUS ONCE
Status: COMPLETED | OUTPATIENT
Start: 2024-01-28 | End: 2024-01-28

## 2024-01-28 RX ORDER — FUROSEMIDE 10 MG/ML
80 INJECTION INTRAMUSCULAR; INTRAVENOUS ONCE
Status: DISCONTINUED | OUTPATIENT
Start: 2024-01-28 | End: 2024-01-28

## 2024-01-28 RX ORDER — FUROSEMIDE 10 MG/ML
40 INJECTION INTRAMUSCULAR; INTRAVENOUS ONCE
Status: COMPLETED | OUTPATIENT
Start: 2024-01-28 | End: 2024-01-28

## 2024-01-28 RX ADMIN — POLYETHYLENE GLYCOL 3350 17 G: 17 POWDER, FOR SOLUTION ORAL at 09:18

## 2024-01-28 RX ADMIN — Medication 1 TABLET: at 09:18

## 2024-01-28 RX ADMIN — HYDROCORTISONE: 25 CREAM TOPICAL at 09:20

## 2024-01-28 RX ADMIN — RIVAROXABAN 15 MG: 15 TABLET, FILM COATED ORAL at 16:17

## 2024-01-28 RX ADMIN — ACETAMINOPHEN 650 MG: 325 TABLET ORAL at 23:02

## 2024-01-28 RX ADMIN — ACETAMINOPHEN 650 MG: 325 TABLET ORAL at 09:30

## 2024-01-28 RX ADMIN — ACETAMINOPHEN 650 MG: 325 TABLET ORAL at 16:16

## 2024-01-28 RX ADMIN — KETOTIFEN FUMARATE 1 DROP: 0.25 SOLUTION/ DROPS OPHTHALMIC at 21:12

## 2024-01-28 RX ADMIN — ROSUVASTATIN 5 MG: 10 TABLET, FILM COATED ORAL at 09:18

## 2024-01-28 RX ADMIN — KETOTIFEN FUMARATE 1 DROP: 0.25 SOLUTION/ DROPS OPHTHALMIC at 09:20

## 2024-01-28 RX ADMIN — FUROSEMIDE 40 MG: 10 INJECTION, SOLUTION INTRAMUSCULAR; INTRAVENOUS at 13:12

## 2024-01-28 RX ADMIN — FUROSEMIDE 60 MG: 10 INJECTION, SOLUTION INTRAMUSCULAR; INTRAVENOUS at 18:20

## 2024-01-28 RX ADMIN — FUROSEMIDE 40 MG: 10 INJECTION, SOLUTION INTRAMUSCULAR; INTRAVENOUS at 09:52

## 2024-01-28 RX ADMIN — SPIRONOLACTONE 25 MG: 25 TABLET ORAL at 09:18

## 2024-01-28 RX ADMIN — Medication 1000 UNITS: at 09:18

## 2024-01-28 ASSESSMENT — COGNITIVE AND FUNCTIONAL STATUS - GENERAL
DRESSING REGULAR LOWER BODY CLOTHING: A LITTLE
DAILY ACTIVITIY SCORE: 21
HELP NEEDED FOR BATHING: A LITTLE
STANDING UP FROM CHAIR USING ARMS: A LITTLE
TOILETING: A LITTLE
DRESSING REGULAR LOWER BODY CLOTHING: A LITTLE
CLIMB 3 TO 5 STEPS WITH RAILING: A LOT
WALKING IN HOSPITAL ROOM: A LITTLE
STANDING UP FROM CHAIR USING ARMS: A LITTLE
MOVING FROM LYING ON BACK TO SITTING ON SIDE OF FLAT BED WITH BEDRAILS: A LITTLE
MOBILITY SCORE: 20
TURNING FROM BACK TO SIDE WHILE IN FLAT BAD: A LITTLE
DRESSING REGULAR UPPER BODY CLOTHING: A LITTLE
MOBILITY SCORE: 17
MOVING TO AND FROM BED TO CHAIR: A LITTLE
PERSONAL GROOMING: A LITTLE
DRESSING REGULAR UPPER BODY CLOTHING: A LITTLE
WALKING IN HOSPITAL ROOM: A LITTLE
HELP NEEDED FOR BATHING: A LITTLE
DAILY ACTIVITIY SCORE: 19
CLIMB 3 TO 5 STEPS WITH RAILING: A LOT

## 2024-01-28 ASSESSMENT — PAIN - FUNCTIONAL ASSESSMENT
PAIN_FUNCTIONAL_ASSESSMENT: 0-10

## 2024-01-28 ASSESSMENT — PAIN SCALES - GENERAL
PAINLEVEL_OUTOF10: 8
PAINLEVEL_OUTOF10: 8
PAINLEVEL_OUTOF10: 2
PAINLEVEL_OUTOF10: 0 - NO PAIN
PAINLEVEL_OUTOF10: 3

## 2024-01-28 ASSESSMENT — ACTIVITIES OF DAILY LIVING (ADL): LACK_OF_TRANSPORTATION: NO

## 2024-01-28 ASSESSMENT — PAIN DESCRIPTION - ORIENTATION
ORIENTATION: RIGHT
ORIENTATION: MID;LOWER
ORIENTATION: RIGHT

## 2024-01-28 ASSESSMENT — PAIN DESCRIPTION - LOCATION
LOCATION: SHOULDER
LOCATION: BACK
LOCATION: SHOULDER

## 2024-01-28 NOTE — PROGRESS NOTES
Aicha Lord is a 89 y.o. female on day 0 of admission presenting with CHF (congestive heart failure), NYHA class I, acute on chronic, combined (CMS/HCC).    Subjective   Patient still remains tachypneic, fluid overload on oxygen 2 L nasal cannula which is new for her.     ROS  Gen: No fatigue, fever, sweats.  Head: No headache, trauma.  Eyes: No vision loss, double vision, drainage, eye pain.  ENT: No hearing changes, pain, epistaxis, congestion  Cardiac: No chest pain  Pulmonary: POSITIVE shortness of breath,  pleuritic pain, POSITIVE leg swelling  Heme/lymph: No swollen glands  GI: No abdominal pain, nausea, vomiting, diarrhea  : No  dysuria, frequency, urgency, hematuria  Musculoskeletal: No limb pain, joint pain, back pain, joint swelling or stiffness.  Skin: No rashes, pruritus, lumps, lesions.  Neuro: No Numbness, tingling, or weakness.  Psych: No  anxiety     Review of systems is otherwise negative unless stated above or in history of present illness.    Objective     Physical Exam  General: Vital signs stable, Pt is alert, no acute distress  Eyes: Conjunctiva normal, PERRL, EOMs intact  HENMT: Normocephalic, atraumatic, external ears and nose normal, no scars or masses.  No mastoid tenderness. Trachea is midline. No meningeal signs, negative Kernig and Brudzinski, moves neck freely.  No sinus tenderness  Resp: Positive tachypnea with increased respiratory effort, on oxygen 2 L nasal cannula, diminished lung sounds with rales in the bases.    CV: Heart is regular rate and rhythm.   Skin: No evidence of trauma, skin is warm and dry. No rashes, lesions or ulcers.  Skel: full range of motion of upper and lower extremities.  Positive swelling lower extremities 2+ pitting edema  Neuro: Normal gait, CN II-XII intact, no motor or sensory changes.  Psych: Alert and oriented ×3, judgment is appropriate, normal mood and affect     Last Recorded Vitals  Blood pressure (!) 152/45, pulse (!) 45, temperature 36.3 °C  "(97.3 °F), temperature source Temporal, resp. rate 18, height 1.499 m (4' 11\"), weight 65.2 kg (143 lb 11.8 oz), SpO2 98 %.  Intake/Output last 3 Shifts:  I/O last 3 completed shifts:  In: - (0 mL/kg)   Out: 550 (8.4 mL/kg) [Urine:550 (0.2 mL/kg/hr)]  Weight: 65.2 kg     Relevant Results  Results for orders placed or performed during the hospital encounter of 01/27/24 (from the past 24 hour(s))   POCT GLUCOSE   Result Value Ref Range    POCT Glucose 113 (H) 74 - 99 mg/dL   CBC   Result Value Ref Range    WBC 5.4 4.4 - 11.3 x10*3/uL    nRBC 0.0 0.0 - 0.0 /100 WBCs    RBC 4.05 4.00 - 5.20 x10*6/uL    Hemoglobin 11.1 (L) 12.0 - 16.0 g/dL    Hematocrit 36.3 36.0 - 46.0 %    MCV 90 80 - 100 fL    MCH 27.4 26.0 - 34.0 pg    MCHC 30.6 (L) 32.0 - 36.0 g/dL    RDW 16.7 (H) 11.5 - 14.5 %    Platelets 186 150 - 450 x10*3/uL   Comprehensive metabolic panel   Result Value Ref Range    Glucose 88 74 - 99 mg/dL    Sodium 139 136 - 145 mmol/L    Potassium 4.0 3.5 - 5.3 mmol/L    Chloride 102 98 - 107 mmol/L    Bicarbonate 31 21 - 32 mmol/L    Anion Gap 10 10 - 20 mmol/L    Urea Nitrogen 20 6 - 23 mg/dL    Creatinine 1.05 0.50 - 1.05 mg/dL    eGFR 50 (L) >60 mL/min/1.73m*2    Calcium 9.6 8.6 - 10.3 mg/dL    Albumin 3.0 (L) 3.4 - 5.0 g/dL    Alkaline Phosphatase 49 33 - 136 U/L    Total Protein 5.7 (L) 6.4 - 8.2 g/dL    AST 23 9 - 39 U/L    Bilirubin, Total 0.7 0.0 - 1.2 mg/dL    ALT 12 7 - 45 U/L   POCT GLUCOSE   Result Value Ref Range    POCT Glucose 73 (L) 74 - 99 mg/dL   POCT GLUCOSE   Result Value Ref Range    POCT Glucose 185 (H) 74 - 99 mg/dL       Imaging Results  XR chest 1 view    Result Date: 1/27/2024  STUDY: Chest Radiograph;  01/27/2024 7:38 AM INDICATION: Chest pain and dyspnea. COMPARISON: 1/6/2024 XR Chest. ACCESSION NUMBER(S): [id number] ORDERING CLINICIAN: JERSEY EMMANUEL TECHNIQUE:  Frontal chest was obtained at 8:38 hours. FINDINGS: CARDIOMEDIASTINAL SILHOUETTE: Heart is enlarged with increased pulmonary " vascularity.  LUNGS: Hazy opacities again noted bilaterally, worst in the left base with small pleural effusions  ABDOMEN: No remarkable upper abdominal findings.  BONES: No acute osseous changes.    Increased pulmonary vascularity. Hazy opacities again noted bilaterally, worst in the left base with small pleural effusions. Signed by Dale Corral MD    ECG 12 lead    Result Date: 1/10/2024  Atrial fibrillation with slow ventricular response Septal infarct (cited on or before 01-JAN-2024) Abnormal ECG Confirmed by Jun Cabrales ([id number]) on 1/10/2024 8:05:51 AM    IR CVC tunneled    Result Date: 1/9/2024  Interpreted By:  Sravan Moss, STUDY: IR CVC TUNNELED; ;  1/8/2024 5:01 pm   ULTRASOUND AND FLUOROSCOPICALLY GUIDED RIGHT INTERNAL JUGULAR VEIN TUNNELED CENTRAL VENOUS CATHETER   INDICATION: Signs/Symptoms:Jj catheter placement for IV abx in patient with CKD. 89-year-old woman with CKD requires IV antibiotics.   COMPARISON: Chest radiograph 01/06/2024   ACCESSION NUMBER(S): [id number]   ORDERING CLINICIAN: TIARA CARLOS   TECHNIQUE:   ATTENDING : Sravan Moss M.D.   TECHNICAL DESCRIPTION/FINDINGS: The procedure, including all risks, benefits and alternatives were explained to the patient in detail. All questions were answered and written informed consent was obtained.   The patient was positioned supine on the fluoroscopy table. A time-out was performed.   The right neck and anterosuperior chest were prepped and draped in usual sterile fashion. Focused ultrasound was performed of the right internal jugular vein demonstrating patency and compressibility. Ultrasound images were saved. 1% lidocaine was administered subcutaneously for local anesthesia. Under real-time ultrasound guidance, the right internal jugular vein was accessed in antegrade direction using micropuncture technique. Ultrasound images were saved. Under fluoroscopic visualization, an 018 guidewire was advanced into the  right atrium and a micropuncture transitional introducer was placed.   Additional 1% lidocaine was then administered over the right anterosuperior chest wall to anesthetize a subcutaneous tunnel tract. The single lumen cuffed small bore central venous catheter was then tunneled from a right anterosuperior chest wall incision site and externalized overlying the venotomy. After serial dilation over an 018 guidewire which had been placed into the IVC, 5 Estonian peel-away sheath was placed. Through the sheath, the 5 Estonian single lumen cuffed central venous catheter was placed. Completion fluoroscopic image demonstrates the catheter tip at the superior cavoatrial junction.   Catheter lumens easily aspirated and flushed were locked with a dilute heparinized solution (10 units/cc). The catheter hub was sutured to the skin with 2 0 Prolene stay suture. A sterile dressing was applied.   SEDATION/MEDICATIONS: Continuous cardiopulmonary monitoring was performed by a radiology nurse for the duration of the procedure. No conscious sedation was administered. Procedural duration 20 minutes. 15 cc 1% Lidocaine was administered subcutaneously for local anesthesia. SPECIMENS: None. ESTIMATED BLOOD LOSS:  5 cc FLOUROSCOPY:  0.2 minutes; DAP  1605 mGy-cm*2; Air Kerma  6.31 mGy CONTRAST: None.       Ultrasound and fluoroscopically guided right internal jugular vein tunneled small bore central venous catheter. The catheter is ready for immediate use.     Signed by: Sravan Moss 1/9/2024 10:51 AM Dictation workstation:   [id number]    ECG 12 lead    Result Date: 1/7/2024  Atrial fibrillation Septal infarct , age undetermined Abnormal ECG When compared with ECG of 22-AUG-2023 14:06, Septal infarct is now Present See ED provider note for full interpretation and clinical correlation Confirmed by Jimmy Cameron ([id number]) on 1/7/2024 9:44:41 PM    XR chest 1 view    Result Date: 1/7/2024  Interpreted By:  Rasheeda Healy, STUDY: XR CHEST 1  VIEW;  1/6/2024 7:23 pm   INDICATION: Signs/Symptoms:shortness of breah.   COMPARISON: 08/22/2023   ACCESSION NUMBER(S): [id number]   ORDERING CLINICIAN: RAMYA CAMPOS   FINDINGS: Cardiac silhouette is enlarged. There are atherosclerotic changes of the aorta.   The lungs appear somewhat hyperinflated. There appear to be faint parenchymal infiltrates. There may be bilateral small effusions.   The bones are not well seen.   COMPARISON OF FINDING: The lungs appear worse.       Enlarged cardiac silhouette. Question of faint parenchymal infiltration.   MACRO: none   Signed by: Rasheeda Healy 1/7/2024 10:09 AM Dictation workstation:   [id number]    Transthoracic Echo (TTE) Complete    Result Date: 1/5/2024   [place] [address]              Tel [phone] and Fax [phone] TRANSTHORACIC ECHOCARDIOGRAM REPORT  Patient Name:      GABRIELLE FABBY CASILLAS    Reading Physician:    [id number] Guzman MD Study Date:        1/5/2024            Ordering Provider:    [id number] CARLOS MRN/PID:           [id number]            Fellow: Accession#:        [id number]        Nurse: Date of Birth/Age: 7/4/1930 / 89 years Sonographer:          Brady San RDCS Gender:            F                   Additional Staff: Height:            150.00 cm           Admit Date: Weight:            61.70 kg            Admission Status:     Inpatient -                                                              Priority discharge BSA:               1.57 m2             Encounter#:           [id number]                                        Department Location:  [facility] Non                                                              Invasive Blood Pressure: 148 /56 mmHg Study Type:    TRANSTHORACIC ECHO (TTE) COMPLETE Diagnosis/ICD: Bacteremia-R78.81 Indication:    bacteremia CPT Code:      Echo Complete w Full [id number] Patient History:  Valve Disorders:   Aortic Stenosis. Pertinent History: HTN and Hyperlipidemia. Study Detail: The following Echo studies were performed: 2D, M-Mode, Doppler and               color flow.  PHYSICIAN INTERPRETATION: Left Ventricle: The left ventricular systolic function is normal, with an estimated ejection fraction of 55-60%. There are no regional wall motion abnormalities. The left ventricular cavity size is normal. There is left ventricular concentric remodeling. Left ventricular diastolic filling was indeterminate. Left Atrium: The left atrium is severely dilated. Right Ventricle: The right ventricle is normal in size. There is normal right ventricular global systolic function. Right Atrium: The right atrium is mildly dilated. Aortic Valve: The aortic valve is trileaflet. There is mild aortic valve cusp calcification. There is evidence of mild aortic valve stenosis. There is mild aortic valve regurgitation. The peak instantaneous gradient of the aortic valve is 14.9 mmHg. The mean gradient of the aortic valve is 8.0 mmHg. Mitral Valve: The mitral valve is normal in structure. There is mild mitral annular calcification. There is trace mitral valve regurgitation. Tricuspid Valve: The tricuspid valve is structurally normal. There is mild tricuspid regurgitation. The Doppler estimated RVSP is mildly elevated at 48.2 mmHg. Pulmonic Valve: The pulmonic valve is structurally normal. There is trace pulmonic valve regurgitation. Pericardium: There is no pericardial effusion noted. Aorta: The aortic root is normal. Systemic Veins: The inferior vena cava appears to be of normal size. There is IVC inspiratory collapse greater than 50%. In comparison to the previous echocardiogram(s): Compared with study from 9/26/2023, no significant change.  CONCLUSIONS:  1. Left ventricular systolic function is normal with a 55-60% estimated ejection fraction.  2. The left atrium is severely dilated.  3. Mildly elevated RVSP.  4. Mild aortic  valve stenosis.  5. Mild aortic valve regurgitation. QUANTITATIVE DATA SUMMARY: 2D MEASUREMENTS:                           Normal Ranges: LAs:           3.80 cm    (2.7-4.0cm) IVSd:          1.20 cm    (0.6-1.1cm) LVPWd:         1.30 cm    (0.6-1.1cm) LVIDd:         3.90 cm    (3.9-5.9cm) LVIDs:         2.30 cm LV Mass Index: 108.1 g/m2 LV % FS        41.0 % LA VOLUME:                               Normal Ranges: LA Vol A4C:        114.2 ml   (22+/-6mL/m2) LA Vol A2C:        79.4 ml LA Vol BP:         98.6 ml LA Vol Index A4C:  72.9ml/m2 LA Vol Index A2C:  50.7 ml/m2 LA Vol Index BP:   62.9 ml/m2 LA Area A4C:       28.6 cm2 LA Area A2C:       24.7 cm2 LA Major Axis A4C: 6.1 cm LA Major Axis A2C: 6.5 cm LA Volume Index:   62.9 ml/m2 RA VOLUME BY A/L METHOD:                               Normal Ranges: RA Vol A4C:        48.8 ml    (8.3-19.5ml) RA Vol Index A4C:  31.2 ml/m2 RA Area A4C:       17.2 cm2 RA Major Axis A4C: 5.2 cm AORTA MEASUREMENTS:                    Normal Ranges: Asc Ao, d: 2.80 cm (2.1-3.4cm) LV SYSTOLIC FUNCTION BY 2D PLANIMETRY (MOD):                     Normal Ranges: EF-A4C View: 56.1 % (>=55%) EF-A2C View: 57.7 % EF-Biplane:  55.9 % AORTIC VALVE:                                    Normal Ranges: AoV Vmax:                1.93 m/s  (<=1.7m/s) AoV Peak P.9 mmHg (<20mmHg) AoV Mean P.0 mmHg  (1.7-11.5mmHg) LVOT Max Austin:            1.23 m/s  (<=1.1m/s) AoV VTI:                 40.80 cm  (18-25cm) LVOT VTI:                22.40 cm LVOT Diameter:           1.80 cm   (1.8-2.4cm) AoV Area, VTI:           1.40 cm2  (2.5-5.5cm2) AoV Area,Vmax:           1.62 cm2  (2.5-4.5cm2) AoV Dimensionless Index: 0.55 AORTIC INSUFFICIENCY: AI Vmax:       3.80 m/s AI Half-time:  638 msec AI Decel Rate: 194.00 cm/s2  RIGHT VENTRICLE: RV Basal 3.61 cm RV Mid   2.94 cm RV Major 5.7 cm TAPSE:   10.9 mm RV s'    0.09 m/s TRICUSPID VALVE/RVSP:                             Normal Ranges: Peak TR  Velocity: 3.36 m/s RV Syst Pressure: 48.2 mmHg (< 30mmHg) PULMONIC VALVE:                          Normal Ranges: PV Accel Time: 119 msec  (>120ms) PV Max Austin:    1.2 m/s   (0.6-0.9m/s) PV Max P.6 mmHg PIEDV:         1.51 m/s PADP:          12.1 mmHg  [id number] Jarrett Guzman MD Electronically signed on 2024 at 3:21:38 PM  ** Final **     CT abdomen pelvis w IV contrast    Result Date: 2024  Interpreted By:  Saleem Gill, STUDY: CT ABDOMEN PELVIS W IV CONTRAST;  2024 11:06 pm   INDICATION: Signs/Symptoms:severe left lower abdominal pain for 24 hours, patient reports previous diverticulitis.   COMPARISON: CT abdomen pelvis dated 2016.   ACCESSION NUMBER(S): [id number]   ORDERING CLINICIAN: VALENTINO ORTIZ   TECHNIQUE: CT of the abdomen and pelvis was performed.  Standard contiguous axial images were obtained through the abdomen and pelvis. Coronal and sagittal reconstructions were performed.   75 ml of contrast Omnipaque 350 were administered intravenously without immediate complication.   FINDINGS: LOWER CHEST: No acute abnormality of the lung bases. Mild aortic valve and coronary artery atherosclerotic calcifications are partially visualized. BONES: There is osteopenia. There is an age indeterminate compression deformity of the inferior endplate of L1 with mild retropulsion new from 2016. There is unchanged grade 1 anterolisthesis of L4 over L5 and L5 over S1. ABDOMINAL WALL: Within normal limits. LYMPH NODES: No pathologically enlarged lymph nodes in the abdomen or pelvis.   ABDOMEN:   LIVER: Normal size and contour. No focal hepatic lesions. BILE DUCTS: Normal caliber. GALLBLADDER: There is cholelithiasis. No evidence of gallbladder wall thickening or distention. PANCREAS: No evidence of pancreatic duct dilatation or peripancreatic edema. SPLEEN: Within normal limits. ADRENALS: Mild bilateral adrenal thickening, which may represent adrenal nodular hyperplasia. KIDNEYS and URETERS: There is  right greater than left renal atrophy, similar to prior. There is normal renal enhancement pattern. Small bilateral renal hypodensities too small to characterize, likely cysts. No evidence of hydronephrosis or obstructive nephrolithiasis. There is a new 3 mm rounded high density within the right lower pole kidney which may represent a nonobstructing calculus versus vascular atherosclerotic calcification.     VESSELS: No aortic aneurysm. There are moderate atherosclerotic calcifications of the abdominal aorta and its branches. Mesenteric vessels are patent. RETROPERITONEUM: No evidence of retroperitoneal hematoma.   PELVIS:   REPRODUCTIVE ORGANS: No pelvic masses. The uterus is present. BLADDER: No gross abnormality.   BOWEL: There is moderate-sized hiatal hernia similar to prior which contains fluid suggestive of reflux esophagitis. The stomach is partially collapsed limiting assessment without evidence of wall thickening. No evidence of small bowel wall thickening or obstruction. Appendix is normal. There is diffuse colonic diverticulosis, and there is focal thickening of the inferior portion of the mid sigmoid colon consistent with diverticulitis, with a small amount of associated pelvic fluid consistent with sigmoid diverticulitis in the similar location compared to prior. PERITONEUM: No free air or organized fluid collection. Small amount of ascitic fluid in the dependent portion of the pelvis..       1.  Diffuse diverticulosis with focal thickening of the inferior wall of the mid sigmoid: And associated small amount of ascitic fluid in the pelvis consistent with sigmoid diverticulitis. This is similar in location compared to 2016.  no evidence of free air or fluid collection. 2. Moderate-sized hiatal hernia filled with fluid suggestive of reflux esophagitis. 3. Osteopenia with age indeterminate compression deformity of the inferior endplate of L1 new from 2016. Correlation with point tenderness is recommended.  4. Cholelithiasis without cholecystitis. Right greater than left renal atrophy similar to prior.     Signed by: Saleem Gill 1/1/2024 11:48 PM Dictation workstation:   [id number]      Medications:  calcium carbonate-vitamin D3, 1 tablet, oral, Daily  cholecalciferol, 1,000 Units, oral, Daily  furosemide, 80 mg, intravenous, Once  hydrocortisone, , Topical, BID  ketotifen, 1 drop, Both Eyes, BID  polyethylene glycol, 17 g, oral, Daily  rivaroxaban, 15 mg, oral, Daily with evening meal  rosuvastatin, 5 mg, oral, Daily  spironolactone, 25 mg, oral, Daily       PRN medications: acetaminophen     Assessment/Plan   Principal Problem:    CHF (congestive heart failure), NYHA class I, acute on chronic, combined (CMS/HCC)     Acute on chronic heart failure   - presented with shortness of breath and chest pain  - Bilateral lower extremity swelling noted  - Chest x-ray shows increased pulmonary vascularity and hazy opacities similar to prior, worse in the left base.  - Recent ECHO 1/5/2024 EF 55-60%  -   - troponins 37/36 (likely demand ischemia d/t CHF)  - 40mg IV lasix x 1 in ED  - Increased to Lasix 80 mg bid iv  - On 2L NC for comfort, not hypoxia     Atrial fibrillation  - EKG: afib with slow ventricular response HR 58  - slow rates on monitor (40-50's, but does dip in upper 30s)  - reviewed previous admission chart: recent admission beta blocker held d/t low rates, was seen by cardiology   - was discharged on event monitor- currently has it on chest wall ( last day of monitor reading is today)  - continue Xarelto      Hypertension  - stable  - continue home medications     Diabetes mellitus  - hypoglycemia protocol/ diabetic diet     Chronic kidney disease stage III     Dyslipidemia   - continue rosuvastatin        Labs/Testing reviewed    Interdisciplinary team rounding completed with hospitalist, nurse, TCC    NP discussed plan and lab/testing results with Dr. Gill    Due to patient still having tachypnea  and fluid overload Lasix was increased to 80 mg twice daily IV.  Patient was upgraded to Dr. Perez for further diuresis.    * 45 minutes total spent on patient's care today; >50% time spent on counseling/coordination of care    Sally Munoz, APRN-CNP

## 2024-01-28 NOTE — PROGRESS NOTES
01/28/24 1035   Discharge Planning   Living Arrangements Children   Support Systems Children   Assistance Needed amb with walker   Type of Residence Private residence   Do you have animals or pets at home? No   Home or Post Acute Services In home services   Type of Home Care Services Home OT;Home PT   Patient expects to be discharged to: [facility]   Financial Resource Strain   How hard is it for you to pay for the very basics like food, housing, medical care, and heating? Not hard   Housing Stability   In the last 12 months, was there a time when you were not able to pay the mortgage or rent on time? N   In the last 12 months, how many places have you lived? 1   In the last 12 months, was there a time when you did not have a steady place to sleep or slept in a shelter (including now)? N   Transportation Needs   In the past 12 months, has lack of transportation kept you from medical appointments or from getting medications? no   In the past 12 months, has lack of transportation kept you from meetings, work, or from getting things needed for daily living? No     Aicha FABBY Lord is a 89 y.o. female on day 0 of admission presenting with CHF (congestive heart failure), NYHA class I, acute on chronic, combined (CMS/[facility]).  Met with patient. Lives with son  Active with [facility]  Josefina Downs RN

## 2024-01-28 NOTE — CARE PLAN
The clinical goals for the shift include HDS    Problem: Pain - Adult  Goal: Verbalizes/displays adequate comfort level or baseline comfort level  Outcome: Progressing     Problem: Safety - Adult  Goal: Free from fall injury  Outcome: Met     Problem: Fall/Injury  Goal: Not fall by end of shift  Outcome: Met  Goal: Be free from injury by end of the shift  Outcome: Met

## 2024-01-29 ENCOUNTER — HOME CARE VISIT (OUTPATIENT)
Dept: HOME HEALTH SERVICES | Facility: HOME HEALTH | Age: 89
End: 2024-01-29
Payer: MEDICARE

## 2024-01-29 ENCOUNTER — APPOINTMENT (OUTPATIENT)
Dept: CARDIOLOGY | Facility: HOSPITAL | Age: 89
DRG: 291 | End: 2024-01-29
Payer: MEDICARE

## 2024-01-29 VITALS
RESPIRATION RATE: 17 BRPM | BODY MASS INDEX: 28.98 KG/M2 | WEIGHT: 143.74 LBS | SYSTOLIC BLOOD PRESSURE: 144 MMHG | OXYGEN SATURATION: 97 % | DIASTOLIC BLOOD PRESSURE: 57 MMHG | TEMPERATURE: 97.2 F | HEIGHT: 59 IN | HEART RATE: 54 BPM

## 2024-01-29 LAB
ANION GAP SERPL CALC-SCNC: 10 MMOL/L (ref 10–20)
BUN SERPL-MCNC: 19 MG/DL (ref 6–23)
CALCIUM SERPL-MCNC: 9.9 MG/DL (ref 8.6–10.3)
CARDIAC TROPONIN I PNL SERPL HS: 27 NG/L (ref 0–13)
CHLORIDE SERPL-SCNC: 99 MMOL/L (ref 98–107)
CO2 SERPL-SCNC: 32 MMOL/L (ref 21–32)
CREAT SERPL-MCNC: 1.06 MG/DL (ref 0.5–1.05)
EGFRCR SERPLBLD CKD-EPI 2021: 49 ML/MIN/1.73M*2
ERYTHROCYTE [DISTWIDTH] IN BLOOD BY AUTOMATED COUNT: 16.5 % (ref 11.5–14.5)
GLUCOSE BLD MANUAL STRIP-MCNC: 103 MG/DL (ref 74–99)
GLUCOSE BLD MANUAL STRIP-MCNC: 142 MG/DL (ref 74–99)
GLUCOSE SERPL-MCNC: 107 MG/DL (ref 74–99)
HCT VFR BLD AUTO: 36.6 % (ref 36–46)
HGB BLD-MCNC: 11.4 G/DL (ref 12–16)
MAGNESIUM SERPL-MCNC: 1.9 MG/DL (ref 1.6–2.4)
MCH RBC QN AUTO: 27.4 PG (ref 26–34)
MCHC RBC AUTO-ENTMCNC: 31.1 G/DL (ref 32–36)
MCV RBC AUTO: 88 FL (ref 80–100)
NRBC BLD-RTO: 0 /100 WBCS (ref 0–0)
PLATELET # BLD AUTO: 189 X10*3/UL (ref 150–450)
POTASSIUM SERPL-SCNC: 3.9 MMOL/L (ref 3.5–5.3)
Q ONSET: 220 MS
QRS COUNT: 10 BEATS
QRS DURATION: 78 MS
QT INTERVAL: 444 MS
QTC CALCULATION(BAZETT): 432 MS
QTC FREDERICIA: 436 MS
R AXIS: 71 DEGREES
RBC # BLD AUTO: 4.16 X10*6/UL (ref 4–5.2)
SODIUM SERPL-SCNC: 137 MMOL/L (ref 136–145)
T AXIS: 68 DEGREES
T OFFSET: 442 MS
VENTRICULAR RATE: 57 BPM
WBC # BLD AUTO: 5.2 X10*3/UL (ref 4.4–11.3)

## 2024-01-29 PROCEDURE — 80048 BASIC METABOLIC PNL TOTAL CA: CPT | Performed by: NURSE PRACTITIONER

## 2024-01-29 PROCEDURE — 2500000004 HC RX 250 GENERAL PHARMACY W/ HCPCS (ALT 636 FOR OP/ED): Performed by: NURSE PRACTITIONER

## 2024-01-29 PROCEDURE — 93005 ELECTROCARDIOGRAM TRACING: CPT

## 2024-01-29 PROCEDURE — 82947 ASSAY GLUCOSE BLOOD QUANT: CPT

## 2024-01-29 PROCEDURE — 83735 ASSAY OF MAGNESIUM: CPT | Performed by: NURSE PRACTITIONER

## 2024-01-29 PROCEDURE — 2500000001 HC RX 250 WO HCPCS SELF ADMINISTERED DRUGS (ALT 637 FOR MEDICARE OP): Performed by: NURSE PRACTITIONER

## 2024-01-29 PROCEDURE — 99232 SBSQ HOSP IP/OBS MODERATE 35: CPT | Performed by: INTERNAL MEDICINE

## 2024-01-29 PROCEDURE — 93010 ELECTROCARDIOGRAM REPORT: CPT | Performed by: INTERNAL MEDICINE

## 2024-01-29 PROCEDURE — 84484 ASSAY OF TROPONIN QUANT: CPT | Performed by: NURSE PRACTITIONER

## 2024-01-29 PROCEDURE — 36415 COLL VENOUS BLD VENIPUNCTURE: CPT | Performed by: NURSE PRACTITIONER

## 2024-01-29 PROCEDURE — 99223 1ST HOSP IP/OBS HIGH 75: CPT | Performed by: STUDENT IN AN ORGANIZED HEALTH CARE EDUCATION/TRAINING PROGRAM

## 2024-01-29 PROCEDURE — 85027 COMPLETE CBC AUTOMATED: CPT | Performed by: NURSE PRACTITIONER

## 2024-01-29 RX ORDER — REGADENOSON 0.08 MG/ML
0.4 INJECTION, SOLUTION INTRAVENOUS
Status: DISCONTINUED | OUTPATIENT
Start: 2024-01-29 | End: 2024-01-29 | Stop reason: HOSPADM

## 2024-01-29 RX ORDER — FUROSEMIDE 40 MG/1
40 TABLET ORAL
Status: DISCONTINUED | OUTPATIENT
Start: 2024-01-30 | End: 2024-01-29 | Stop reason: HOSPADM

## 2024-01-29 RX ORDER — FUROSEMIDE 10 MG/ML
80 INJECTION INTRAMUSCULAR; INTRAVENOUS ONCE
Status: DISCONTINUED | OUTPATIENT
Start: 2024-01-29 | End: 2024-01-29 | Stop reason: HOSPADM

## 2024-01-29 RX ADMIN — Medication 1 TABLET: at 08:54

## 2024-01-29 RX ADMIN — SPIRONOLACTONE 25 MG: 25 TABLET ORAL at 08:53

## 2024-01-29 RX ADMIN — FUROSEMIDE 80 MG: 10 INJECTION, SOLUTION INTRAMUSCULAR; INTRAVENOUS at 12:51

## 2024-01-29 RX ADMIN — ACETAMINOPHEN 650 MG: 325 TABLET ORAL at 12:36

## 2024-01-29 RX ADMIN — Medication 1000 UNITS: at 08:54

## 2024-01-29 RX ADMIN — ROSUVASTATIN 5 MG: 10 TABLET, FILM COATED ORAL at 08:54

## 2024-01-29 RX ADMIN — RIVAROXABAN 15 MG: 15 TABLET, FILM COATED ORAL at 17:19

## 2024-01-29 NOTE — CARE PLAN
The patient's goals for the shift include      The clinical goals for the shift include patient to report decrease in pain    Problem: Nutrition  Goal: Less than 5 days NPO/clear liquids  Outcome: Progressing  Goal: Oral intake greater than 50%  Outcome: Progressing  Goal: Oral intake greater 75%  Outcome: Progressing  Goal: Consume prescribed supplement  Outcome: Progressing  Goal: Adequate PO fluid intake  Outcome: Progressing  Goal: Nutrition support goals are met within 48 hrs  Outcome: Progressing  Goal: Nutrition support is meeting 75% of nutrient needs  Outcome: Progressing  Goal: Tube feed tolerance  Outcome: Progressing  Goal: BG  mg/dL  Outcome: Progressing  Goal: Lab values WNL  Outcome: Progressing  Goal: Electrolytes WNL  Outcome: Progressing  Goal: Promote healing  Outcome: Progressing  Goal: Maintain stable weight  Outcome: Progressing  Goal: Reduce weight from edema/fluid  Outcome: Progressing  Goal: Gradual weight gain  Outcome: Progressing  Goal: Improve ostomy output  Outcome: Progressing     Problem: Pain - Adult  Goal: Verbalizes/displays adequate comfort level or baseline comfort level  Outcome: Progressing     Problem: Discharge Planning  Goal: Discharge to home or other facility with appropriate resources  Outcome: Progressing     Problem: Chronic Conditions and Co-morbidities  Goal: Patient's chronic conditions and co-morbidity symptoms are monitored and maintained or improved  Outcome: Progressing     Problem: Fall/Injury  Goal: Verbalize understanding of personal risk factors for fall in the hospital  Outcome: Progressing  Goal: Verbalize understanding of risk factor reduction measures to prevent injury from fall in the home  Outcome: Progressing  Goal: Use assistive devices by end of the shift  Outcome: Progressing  Goal: Pace activities to prevent fatigue by end of the shift  Outcome: Progressing     Problem: Diabetes  Goal: Achieve decreasing blood glucose levels by end of  shift  Outcome: Progressing  Goal: Increase stability of blood glucose readings by end of shift  Outcome: Progressing  Goal: Decrease in ketones present in urine by end of shift  Outcome: Progressing  Goal: Maintain electrolyte levels within acceptable range throughout shift  Outcome: Progressing  Goal: Maintain glucose levels >70mg/dl to <250mg/dl throughout shift  Outcome: Progressing  Goal: No changes in neurological exam by end of shift  Outcome: Progressing  Goal: Learn about and adhere to nutrition recommendations by end of shift  Outcome: Progressing  Goal: Vital signs within normal range for age by end of shift  Outcome: Progressing  Goal: Increase self care and/or family involovement by end of shift  Outcome: Progressing  Goal: Receive DSME education by end of shift  Outcome: Progressing     Problem: Heart Failure  Goal: Improved gas exchange this shift  Outcome: Progressing  Goal: Improved urinary output this shift  Outcome: Progressing  Goal: Reduction in peripheral edema within 24 hours  Outcome: Progressing  Goal: Report improvement of dyspnea/breathlessness this shift  Outcome: Progressing  Goal: Weight from fluid excess reduced over 2-3 days, then stabilize  Outcome: Progressing  Goal: Increase self care and/or family involvement in 24 hours  Outcome: Progressing     Problem: Pain  Goal: Takes deep breaths with improved pain control throughout the shift  Outcome: Progressing  Goal: Turns in bed with improved pain control throughout the shift  Outcome: Progressing  Goal: Walks with improved pain control throughout the shift  Outcome: Progressing  Goal: Performs ADL's with improved pain control throughout shift  Outcome: Progressing  Goal: Participates in PT with improved pain control throughout the shift  Outcome: Progressing  Goal: Free from opioid side effects throughout the shift  Outcome: Progressing  Goal: Free from acute confusion related to pain meds throughout the shift  Outcome: Progressing

## 2024-01-29 NOTE — CARE PLAN
Pt seen and examined, family at bedside.   Having some R shoulder / arm discomfort which seems to be related to chronic kyphosis per d/w family.   Respiratory status/O2 sats stable on 2 L NC (none at baseline).   Family and pt request to be discharged today.   Cleared by cardiology.   Will wean O2 to RA, assess on ambulation (d/w RN).   Transition to chronic dose PO Lasix as rec by cardio.   DC to home with HC if ambulation stable.     used

## 2024-01-29 NOTE — CONSULTS
History Of Present Illness:    Aicha Lord is a 89 y.o. female with a past medical history of persistent atrial fibrillation on Xarelto, diet-controlled diabetes mellitus type II, CKD, Remote DVT/PE, polymyalgia rheumatic, COLLEEN, presents with complaints of dyspnea that started during the night PTA (1/27/24).  Patient is receiving furosemide 80 mg IV twice daily with optimal urinary response.  Cardiology consulted for further evaluation of heart failure.    Evaluated patient today here at [facility], her granddaughters were present (they are medical providers), she gives permission for them to participate in history obtaining.  Patient presented to the ED Saturday 1/27/24 for complaints of dyspnea and chest pain that woke her up.  She reports that prior to symptoms she was at her normal state of health on Friday, she ate Pasta before going to bed.  She reports being awakened in the early morning with lower extremity swelling, sudden onset of right chest wall discomfort that radiated down her right arm and significant dyspnea that was so debilitating that she felt like she was going to pass out. She immediately called her granddaughter and asked that 911 be called so that she can be taken to the ED for further evaluation.  When EMS arrived, reportedly patient's oxygen saturation was stable but she was placed on supplemental oxygen for comfort.  She reports that when she arrived to the ED she was given IV diuretic therapy and has been having optimal urinary response with improved breathing daily.  At this time, patient continues to have intermittent right chest wall discomfort that radiates down her R arm, but it is not present at this time.  Patient and her granddaughters feel that she is back to baseline and she wishes to be discharged home.  The granddaughters state that they are both medical providers and that she will be returning home with them and that they will monitor her.    In addition, patient reports that  she lives at home alone, uses a walker to ambulate, cooks, cleans, maintains her home and bathes independently.  Prior to Saturday morning, she denies any complaints of chest pain, weight gain, falls or syncopal events within the last 3 months.  She is followed by cardiologist Dr. Facundo King.  patient denies any past medical history of cardiac surgeries or coronary percutaneous interventions.    ED course:  Initial EKG: Atrial Fibrillation HR 48  Current vital signs: Afebrile 98.9, HR 56, /69, 100% on supplemental o2  Arrival vital signs: /57, HR 60, 94% on room air  Pertinent imaging/Labs:   CXR: Hazy opacities again noted bilaterally, worst in the left base with  small pleural effusions  Labs:  (was 334 on 1/11/24), HS trop 37, 36, CR 1.06  IV diuretics this admit: Lasix 40 mg IV BID 1/27 & 1/28, 1/28 + 60 mg IV, now on 80 mg IV BID         Patient follows with Cardiologist Dr. Facundo King   Home CV meds:  Furosemide 40 mg p.o. twice daily, rosuvastatin 5 mg p.o. daily, spironolactone 25 mg p.o. daily Xarelto 15 mg p.o. daily    All other systems reviewed and negative unless as mentioned in HPI.       Past Medical/ Surgical History:  Persistent atrial fibrillation on Xarelto  Remote Deep vein thrombosis/pulmonary embolism  Diastolic congestive heart failure  Hyperlipidemia  Hypertension  Diabetes mellitus type 2  Chronic kidney disease  Iron deficiency anemia  Fibromyalgia  Polymyalgia rheumatic   Right Breast cancer s/p breast lumpectomy/mastectomy  Cataract surgery  Bilateral Knee replacement    Social History:  Denies smoking, alcohol or illicit drug use     Family History:  Reviewed and not pertinent to presenting problem      Past Cardiology Tests (Last 3 Years):  Echocardiogram 1/5/2024  1. Left ventricular systolic function is normal with a 55-60% estimated ejection fraction.   2. The left atrium is severely dilated.   3. Mildly elevated RVSP.   4. Mild aortic valve stenosis.   5. Mild  aortic valve regurgitation.      Family History   Problem Relation Name Age of Onset    Other (cardiac disorder) Father      Hypertension Father      Hodgkin's lymphoma Brother      Hypertension Paternal Grandmother      Hypertension Paternal Grandfather      Hodgkin's lymphoma Other family history         Allergies:  Celecoxib, Ciprofloxacin, Penicillins, Promethazine, Statins-hmg-coa reductase inhibitors, Sulfamethoxazole-trimethoprim, and Tramadol    ROS:  10 point review of systems including (Constitutional, Eyes, ENMT, Respiratory, Cardiac, Gastrointestinal, Neurological, Psychiatric, and Hematologic) was performed and is otherwise negative.    Objective Data:  Last Recorded Vitals:  Vitals:    24 0040 24 0506 24 0819   BP: 140/58 151/60 143/67 135/69   BP Location: Left arm      Patient Position: Lying Lying Lying Lying   Pulse: 56 66 67 56   Resp: 18 17 17 17   Temp: 36.8 °C (98.2 °F) 36.7 °C (98.1 °F) 35.6 °C (96.1 °F) 37.2 °C (98.9 °F)   TempSrc: Temporal Temporal Temporal Temporal   SpO2: 99% 99% 98% 100%   Weight:       Height:         Medical Gas Therapy: Supplemental oxygen  O2 Delivery Method: Nasal cannula  Weight  Av.2 kg (143 lb 11.8 oz)  Min: 65.2 kg (143 lb 11.8 oz)  Max: 65.2 kg (143 lb 11.8 oz)      LABS:  CMP:  Results from last 7 days   Lab Units 24  0824  0334 24  0821   SODIUM mmol/L 137 139 140   POTASSIUM mmol/L 3.9 4.0 3.6   CHLORIDE mmol/L 99 102 103   CO2 mmol/L 32 31 29   ANION GAP mmol/L 10 10 12   BUN mg/dL 19 20 21   CREATININE mg/dL 1.06* 1.05 1.08*   EGFR mL/min/1.73m*2 49* 50* 48*   MAGNESIUM mg/dL 1.90  --  2.00   ALBUMIN g/dL  --  3.0* 3.1*   ALT U/L  --  12 12   AST U/L  --  23 19   BILIRUBIN TOTAL mg/dL  --  0.7 0.6     CBC:  Results from last 7 days   Lab Units 24  0824  0334 24  0821   WBC AUTO x10*3/uL 5.2 5.4 4.8   HEMOGLOBIN g/dL 11.4* 11.1* 11.2*   HEMATOCRIT % 36.6 36.3 36.0   PLATELETS AUTO  "x10*3/uL 189 186 187   MCV fL 88 90 88     COAG:   Results from last 7 days   Lab Units 01/27/24  0821   INR  1.5*     ABO: No results found for: \"ABO\"  HEME/ENDO:     CARDIAC:   Results from last 7 days   Lab Units 01/27/24  0959 01/27/24  0821   TROPHS ng/L 36* 37*   BNP pg/mL  --  442*             Last I/O:    Intake/Output Summary (Last 24 hours) at 1/29/2024 1237  Last data filed at 1/29/2024 0819  Gross per 24 hour   Intake --   Output 1700 ml   Net -1700 ml     Net IO Since Admission: -2,950 mL [01/29/24 1237]      Imaging Results:  ECG 12 lead    Result Date: 1/29/2024  Atrial fibrillation with slow ventricular response Abnormal ECG When compared with ECG of 09-JAN-2024 13:55, Criteria for Septal infarct are no longer Present    XR chest 1 view    Result Date: 1/27/2024  STUDY: Chest Radiograph;  01/27/2024 7:38 AM INDICATION: Chest pain and dyspnea. COMPARISON: 1/6/2024 XR Chest. ACCESSION NUMBER(S): [id number] ORDERING CLINICIAN: JERSEY EMMANUEL TECHNIQUE:  Frontal chest was obtained at 8:38 hours. FINDINGS: CARDIOMEDIASTINAL SILHOUETTE: Heart is enlarged with increased pulmonary vascularity.  LUNGS: Hazy opacities again noted bilaterally, worst in the left base with small pleural effusions  ABDOMEN: No remarkable upper abdominal findings.  BONES: No acute osseous changes.    Increased pulmonary vascularity. Hazy opacities again noted bilaterally, worst in the left base with small pleural effusions. Signed by Dale Corral MD      Inpatient Medications:  Scheduled medications   Medication Dose Route Frequency    calcium carbonate-vitamin D3  1 tablet oral Daily    cholecalciferol  1,000 Units oral Daily    furosemide  80 mg intravenous q12h    hydrocortisone   Topical BID    ketotifen  1 drop Both Eyes BID    polyethylene glycol  17 g oral Daily    rivaroxaban  15 mg oral Daily with evening meal    rosuvastatin  5 mg oral Daily    spironolactone  25 mg oral Daily     PRN medications   Medication    " acetaminophen     Continuous Medications   Medication Dose Last Rate       Inpatient Medications:  Scheduled medications   Medication Dose Route Frequency    calcium carbonate-vitamin D3  1 tablet oral Daily    cholecalciferol  1,000 Units oral Daily    furosemide  80 mg intravenous q12h    hydrocortisone   Topical BID    ketotifen  1 drop Both Eyes BID    polyethylene glycol  17 g oral Daily    rivaroxaban  15 mg oral Daily with evening meal    rosuvastatin  5 mg oral Daily    spironolactone  25 mg oral Daily     PRN medications   Medication    acetaminophen     Continuous Medications   Medication Dose Last Rate     Outpatient Medications:  Current Outpatient Medications   Medication Instructions    acetaminophen (Tylenol) 500 mg tablet 2 tablets, oral, Daily PRN    calcium carbonate-vitamin D3 600 mg-10 mcg (400 unit) capsule 1 tablet, oral, Daily    cholecalciferol (Vitamin D-3) 25 MCG (1000 UT) capsule 1 capsule, oral, Daily, TAKE AS DIRECTED.    fluocinolone (Synalar) 0.01 % external solution 1 Application, Topical, 2 times daily    furosemide (Lasix) 40 mg tablet TAKE 1 TABLET TWICE A DAY BY  MOUTH    ketotifen (Zaditor) 0.025 % (0.035 %) ophthalmic solution ophthalmic (eye), 1 drop in both eyes 2 times a day as needed for itching/allergies    rosuvastatin (CRESTOR) 5 mg, oral, Daily    spironolactone (ALDACTONE) 25 mg, oral, Daily    vit A,C and E-lutein-minerals (Ocuvite with Lutein) 300 mcg-200 mg-27 mg-2 mg tablet 1 tablet, oral, Daily    Xarelto 15 mg, oral, Daily with evening meal       Physical Exam:  General: Pleasant elderly female, alert and oriented, NAD  HEENT:  Pupils equal and reactive.  Normocephalic.  Moist mucosa.    Neck:  No thyromegaly.  Normal Jugular Venous Pressure.  Cardiovascular:  Regular rate and rhythm.  No cardiac murmurs noted.  Normal S1 and S2.  Pulmonary: Diminished throughout, no apparent respiratory distress, supp 02 via nc 2 liters  Abdomen:  Soft. Non-tender.    Non-distended.  Positive bowel sounds.  Lower Extremities:  2+ pedal pulses. No LE edema.  Neurologic:  Cranial nerves intact.  No focal deficit.   Skin: Skin warm and dry, normal skin turgor.   Psychiatric: Appropriate mood and behavior     Assessment/Plan   Aicha Lord is a 89 y.o. female with a past medical history of persistent atrial fibrillation on Xarelto, diet-controlled diabetes mellitus type II, CKD, Remote DVT/PE, polymyalgia rheumatic, COLLEEN, presents with complaints of dyspnea that started during the night PTA (1/27/24).  Patient is receiving furosemide 80 mg IV twice daily with optimal urinary response.  Cardiology consulted for further evaluation of heart failure.    I reviewed EKG, atrial fibrillation SVR HR 48  I reviewed telemetry, Atrial Fibrillation Rates 40-60's  I reviewed all labs and imaging reports    Acute on chronic diastolic heart failure LVEF 55-60% (last TTE 1/5/24). HF exacerbation in setting of dietary indiscretion> consuming pasta prior to s/s of dyspnea, CP and lower extremity swelling. Appears euvolemic on exam after 2 days of IV diuretic therapy  Elevated biomarkers   Chest x-ray reveals small pleural effusions  Home dose furosemide 40 mg p.o. twice daily and spironolactone 25 daily  Daily weights, strict I's and O's, monitor renal function, keep K>4.0 and mag>2.0, supplement as needed    2. Chest pain. Right CW, radiates down right arm>intermittent and was present on arrival. Low suspicion for PE d/t reported compliance with Xarelto.  Trop flat 37, 36, in sett of Acute HF. Non MI trop elevation, core measures do not apply.     3.  Persistent atrial fibrillation  - c/w Xarelto    4.  Hypertension.  Acceptable given current circumstances  - Continue home regimen    Recommendations after discussion with cardiologist Dr. Logan:  Patient appears euvolemic on exam and wishes to be discharged home   Family reports that patient will reside with them on discharge > will not be  living alone  Wean oxygen to off> not worn at home> was reported that it was only placed for comfort, not for hypoxia  C/w IV diuretics today as ordered  Transition to home dose diuretic tomorrow (Lasix 40 mg PO BID)  C/w remaining CV meds per outpt regimen   Will request outpt Nuclear Stress Test  Patient will benefit from follow up with Dr. King post discharge> she will call for appointment  No cardiac barriers to discharge  Please call with questions    Code Status:  Full Code    Inpatient consult to Cardiology  Consult performed by: Valdemar CARBONE MD  Consult ordered by: Angelica Mason, JULIUS-CNP  Reason for consult: HF        Thank you for allowing me to participate in their care.  Please feel free to call me with any further questions or concerns.    Valdemar Logan MD, FACC, [facility]  Division of Cardiovascular Medicine  Medical Director, [facility] [place]  Clinical , [employer]  [email or web address]   Office:  [phone]          Both the ANNELIESE and I have had a face to face encounter with the patient today. I have examined the patient and edited the documented physical examination as necessary.  I personally reviewed the patient's vital signs, telemetry, recent labs, medications, orders, EKGs, and pertinent cardiac imaging/ echocardiography.  I have reviewed the ANNELIESE's encounter note, approve the ANNELIESE's documentation and have edited the note to reflect my diagnostic and therapeutic plan.

## 2024-01-29 NOTE — PROGRESS NOTES
Aicha Lord is a 89 y.o. female     Patient has a bloody nose this morning  Breathing has improved    Review of Systems     Constitutional: no fever, no chills, not feeling poorly, not feeling tired   Cardiovascular: no chest pain   Respiratory: Shortness of breath  Gastrointestinal: no abdominal pain, no constipation, no melena, no nausea, no diarrhea, no vomiting and no blood in stools.   Neurological: no headache,   All other systems have been reviewed and are negative for complaint.       Vitals:    01/29/24 0819   BP: 135/69   Pulse: 56   Resp: 17   Temp: 37.2 °C (98.9 °F)   SpO2: 100%        Scheduled medications  calcium carbonate-vitamin D3, 1 tablet, oral, Daily  cholecalciferol, 1,000 Units, oral, Daily  furosemide, 80 mg, intravenous, q12h  hydrocortisone, , Topical, BID  ketotifen, 1 drop, Both Eyes, BID  polyethylene glycol, 17 g, oral, Daily  rivaroxaban, 15 mg, oral, Daily with evening meal  rosuvastatin, 5 mg, oral, Daily  spironolactone, 25 mg, oral, Daily      Continuous medications     PRN medications  PRN medications: acetaminophen    Lab Review   Results from last 7 days   Lab Units 01/29/24  0824 01/28/24  0334 01/27/24  0821   WBC AUTO x10*3/uL 5.2 5.4 4.8   HEMOGLOBIN g/dL 11.4* 11.1* 11.2*   HEMATOCRIT % 36.6 36.3 36.0   PLATELETS AUTO x10*3/uL 189 186 187     Results from last 7 days   Lab Units 01/29/24  0824 01/28/24  0334 01/27/24  0821   SODIUM mmol/L 137 139 140   POTASSIUM mmol/L 3.9 4.0 3.6   CHLORIDE mmol/L 99 102 103   CO2 mmol/L 32 31 29   BUN mg/dL 19 20 21   CREATININE mg/dL 1.06* 1.05 1.08*   CALCIUM mg/dL 9.9 9.6 9.9   PROTEIN TOTAL g/dL  --  5.7* 5.9*   BILIRUBIN TOTAL mg/dL  --  0.7 0.6   ALK PHOS U/L  --  49 53   ALT U/L  --  12 12   AST U/L  --  23 19   GLUCOSE mg/dL 107* 88 107*     Results from last 7 days   Lab Units 01/27/24  0959 01/27/24  0821   TROPHS ng/L 36* 37*        XR chest 1 view   Final Result   Increased pulmonary vascularity. Hazy opacities again  noted   bilaterally, worst in the left base with small pleural effusions.   Signed by Dale Corral MD            Physical Exam     Constitutional   General appearance: Alert     Pulmonary   Respiratory assessment: No respiratory distress, normal respiratory rhythm and effort.    Auscultation of Lungs: Fine basilar crackles  Cardiovascular   Auscultation of heart: Apical pulse normal, heart rate and rhythm normal, normal S1 and S2, no murmurs and no pericardial rub.    Exam for edema: Trace edema  No JVD appreciated  Abdomen   Abdominal Exam: No bruits, normal bowel sounds, soft, non-tender, no abdominal mass palpated.    Liver and Spleen exam: No hepato-splenomegaly.   Musculoskeletal   Examination of gait: ROM intact  Skin inspection: Normal skin color and pigmentation, normal skin turgor and no visible rash.   Neurologic   Cranial nerves: Nerves 2-12 were intact, no focal neuro defects.     Assessment/Plan      #Acute on chronic combined systolic and diastolic congestive heart failure  Responding well to IV diuresis  Continue diuresis with daily weights and electrolytes    #Epistaxis  From nasal cannula  At humidifier    #Elevated troponins  Flat trend  Likely demand ischemia    #Atrial fibrillation  Rate controlled  Continue Xarelto    #Diabetes mellitus  Sliding-scale insulin coverage    #Hypertension  Stable continue home medications    #Dyslipidemia  Stable  Continue statins with a target LDL of less than 70    #CKD 3 AA  Monitor while diuresing

## 2024-01-29 NOTE — PROGRESS NOTES
Patient not currently medically ready for discharge.  Patient continues with IV diuresis.    Plan remains for patient to return home and resume [facility] upon discharge.  Will continue to follow for discharge planning needs.

## 2024-01-30 ENCOUNTER — DOCUMENTATION (OUTPATIENT)
Dept: HOME HEALTH SERVICES | Facility: HOME HEALTH | Age: 89
End: 2024-01-30
Payer: MEDICARE

## 2024-01-30 ENCOUNTER — APPOINTMENT (OUTPATIENT)
Dept: GASTROENTEROLOGY | Facility: HOSPITAL | Age: 89
End: 2024-01-30
Payer: MEDICARE

## 2024-01-30 ENCOUNTER — TELEPHONE (OUTPATIENT)
Dept: CARDIOLOGY | Facility: CLINIC | Age: 89
End: 2024-01-30
Payer: MEDICARE

## 2024-01-30 ENCOUNTER — PATIENT OUTREACH (OUTPATIENT)
Dept: CARE COORDINATION | Facility: CLINIC | Age: 89
End: 2024-01-30
Payer: MEDICARE

## 2024-01-30 LAB
Q ONSET: 217 MS
QRS COUNT: 8 BEATS
QRS DURATION: 78 MS
QT INTERVAL: 460 MS
QTC CALCULATION(BAZETT): 410 MS
QTC FREDERICIA: 427 MS
R AXIS: 1 DEGREES
T AXIS: 29 DEGREES
T OFFSET: 447 MS
VENTRICULAR RATE: 48 BPM

## 2024-01-30 NOTE — PROGRESS NOTES
Discharge Facility:[facility]  Discharge Diagnosis:CHF dyspnea  Admission Date:01/27/24  Discharge Date: 01/29/24    PCP Appointment Date:none available sent to pcp office  Specialist Appointment Date:   Hospital Encounter and Summary: Linked  See discharge assessment below for further details  Engagement  Call Start Time: 0853 (1/30/2024  8:53 AM)    Medications  Medications reviewed with patient/caregiver?: Yes (1/30/2024  8:53 AM)  Is the patient having any side effects they believe may be caused by any medication additions or changes?: No (1/30/2024  8:53 AM)  Does the patient have all medications ordered at discharge?: Not applicable (1/30/2024  8:53 AM)  Is the patient taking all medications as directed (includes completed medication regime)?: Yes (1/30/2024  8:53 AM)    Appointments  Does the patient have a primary care provider?: Yes (1/30/2024  8:53 AM)  Care Management Interventions: Advised patient to make appointment (1/30/2024  8:53 AM)    Self Management  Has home health visited the patient within 72 hours of discharge?: No (will be calling today) (1/30/2024  8:53 AM)  Has all Durable Medical Equipment (DME) been delivered?: No (1/30/2024  8:53 AM)    Patient Teaching  Does the patient have access to their discharge instructions?: Yes (1/30/2024  8:53 AM)  Care Management Interventions: Reviewed instructions with patient (reviewed with caregiver) (1/30/2024  8:53 AM)  What is the patient's perception of their health status since discharge?: Same (per caregiver shes doing a little better than when she was in the hospital .) (1/30/2024  8:53 AM)    Wrap Up  Call End Time: 0900 (1/30/2024  8:53 AM)

## 2024-01-30 NOTE — HH CARE COORDINATION
Home Care received a Referral for Physical Therapy and Occupational Therapy. We have processed the referral for a Start of Care in 24-48h.     If you have any questions or concerns, please feel free to contact us at [phone]. Follow the prompts, enter your five digit zip code, and you will be directed to your care team on [employer] [facility].

## 2024-01-31 ENCOUNTER — HOME CARE VISIT (OUTPATIENT)
Dept: HOME HEALTH SERVICES | Facility: HOME HEALTH | Age: 89
End: 2024-01-31
Payer: MEDICARE

## 2024-01-31 LAB
ATRIAL RATE: 41 BPM
Q ONSET: 223 MS
QRS COUNT: 10 BEATS
QRS DURATION: 78 MS
QT INTERVAL: 460 MS
QTC CALCULATION(BAZETT): 460 MS
QTC FREDERICIA: 460 MS
R AXIS: 3 DEGREES
T AXIS: 109 DEGREES
T OFFSET: 453 MS
VENTRICULAR RATE: 60 BPM

## 2024-01-31 PROCEDURE — G0151 HHCP-SERV OF PT,EA 15 MIN: HCPCS

## 2024-01-31 ASSESSMENT — ENCOUNTER SYMPTOMS
SUBJECTIVE PAIN PROGRESSION: UNCHANGED
PAIN: 1
PAIN LOCATION - RELIEVING FACTORS: TYLENOL
PERSON REPORTING PAIN: PATIENT
HIGHEST PAIN SEVERITY IN PAST 24 HOURS: 5/10
PAIN LOCATION: BACK

## 2024-01-31 ASSESSMENT — ACTIVITIES OF DAILY LIVING (ADL)
OASIS_M1830: 03
ENTERING_EXITING_HOME: STAND BY ASSIST
AMBULATION_DISTANCE/DURATION_TOLERATED: SHORT HOUSEHOLD
AMBULATION ASSISTANCE ON FLAT SURFACES: 1

## 2024-01-31 NOTE — DISCHARGE SUMMARY
Discharge Diagnosis  CHF (congestive heart failure), NYHA class I, acute on chronic, combined (CMS/[facility])    Issues Requiring Follow-Up  Follow-up with cardiology    Test Results Pending At Discharge  Pending Labs       No current pending labs.            Hospital Course     #Acute on chronic combined systolic and diastolic congestive heart failure  Responding well to IV diuresis  Continue diuresis with daily weights and electrolytes     #Epistaxis  From nasal cannula  At humidifier     #Elevated troponins  Flat trend  Likely demand ischemia     #Atrial fibrillation  Rate controlled  Continue Xarelto     #Diabetes mellitus  Sliding-scale insulin coverage     #Hypertension  Stable continue home medications     #Dyslipidemia  Stable  Continue statins with a target LDL of less than 70     #CKD 3 AA  Monitor while diuresing  Pertinent Physical Exam At Time of Discharge  Physical Exam    Home Medications     Medication List      CONTINUE taking these medications     acetaminophen 500 mg tablet; Commonly known as: Tylenol   calcium carbonate-vitamin D3 600 mg-10 mcg (400 unit) capsule   cholecalciferol 25 MCG (1000 UT) capsule; Commonly known as: Vitamin D-3   fluocinolone 0.01 % external solution; Commonly known as: Synalar; Apply   1 Application topically 2 times a day.   furosemide 40 mg tablet; Commonly known as: Lasix; TAKE 1 TABLET TWICE A   DAY BY  MOUTH   ketotifen 0.025 % (0.035 %) ophthalmic solution; Commonly known as:   Zaditor   Ocuvite with Lutein 300 mcg-200 mg-27 mg-2 mg tablet; Generic drug: vit   A,C and E-lutein-minerals   rosuvastatin 5 mg tablet; Commonly known as: Crestor; Take 1 tablet (5   mg) by mouth once daily.   spironolactone 25 mg tablet; Commonly known as: Aldactone; Take 1 tablet   (25 mg) by mouth once daily.   Xarelto 15 mg tablet; Generic drug: rivaroxaban     STOP taking these medications     metroNIDAZOLE 500 mg tablet; Commonly known as: Flagyl       Outpatient Follow-Up  Future  Appointments   Date Time Provider Department Center   2/1/2024 To Be Determined Maddy Daniels, R[facility]MON[facility]   2/12/2024 10:45 AM Cresencio Jacinto MD [id number] [facility]   4/22/2024  1:00 PM Facundo King MD [id number] [facility]   4/23/2024 11:45 AM Reno Alfred MD [id number] [facility]       Marian Perez MD

## 2024-02-01 ENCOUNTER — HOME CARE VISIT (OUTPATIENT)
Dept: HOME HEALTH SERVICES | Facility: HOME HEALTH | Age: 89
End: 2024-02-01
Payer: MEDICARE

## 2024-02-01 PROCEDURE — G0152 HHCP-SERV OF OT,EA 15 MIN: HCPCS

## 2024-02-01 SDOH — ECONOMIC STABILITY: HOUSING INSECURITY: TEMPORARY ARRANGEMENT: 1

## 2024-02-01 ASSESSMENT — ENCOUNTER SYMPTOMS
AGGRESSION WITHIN DEFINED LIMITS: 1
PAIN LOCATION: RIGHT SHOULDER
NYSTAGMUS: 0
DESCRIPTION OF MEMORY LOSS: SHORT TERM
ANGER WITHIN DEFINED LIMITS: 1
DOUBLE VISION: 0
SUBJECTIVE PAIN PROGRESSION: UNCHANGED
PAIN: 1
PERSON REPORTING PAIN: PATIENT

## 2024-02-02 ENCOUNTER — HOME CARE VISIT (OUTPATIENT)
Dept: HOME HEALTH SERVICES | Facility: HOME HEALTH | Age: 89
End: 2024-02-02
Payer: MEDICARE

## 2024-02-02 ENCOUNTER — OFFICE VISIT (OUTPATIENT)
Dept: PRIMARY CARE | Facility: CLINIC | Age: 89
End: 2024-02-02
Payer: MEDICARE

## 2024-02-02 VITALS
RESPIRATION RATE: 16 BRPM | DIASTOLIC BLOOD PRESSURE: 56 MMHG | HEART RATE: 55 BPM | BODY MASS INDEX: 27.01 KG/M2 | SYSTOLIC BLOOD PRESSURE: 128 MMHG | HEIGHT: 59 IN | WEIGHT: 134 LBS | OXYGEN SATURATION: 98 %

## 2024-02-02 DIAGNOSIS — J90 PLEURAL EFFUSION: Primary | ICD-10-CM

## 2024-02-02 DIAGNOSIS — I50.43 CHF (CONGESTIVE HEART FAILURE), NYHA CLASS I, ACUTE ON CHRONIC, COMBINED (MULTI): Primary | ICD-10-CM

## 2024-02-02 DIAGNOSIS — R06.00 DYSPNEA, UNSPECIFIED TYPE: ICD-10-CM

## 2024-02-02 DIAGNOSIS — E11.65 TYPE 2 DIABETES MELLITUS WITH HYPERGLYCEMIA, WITHOUT LONG-TERM CURRENT USE OF INSULIN (MULTI): ICD-10-CM

## 2024-02-02 PROCEDURE — 1159F MED LIST DOCD IN RCRD: CPT | Performed by: FAMILY MEDICINE

## 2024-02-02 PROCEDURE — 1157F ADVNC CARE PLAN IN RCRD: CPT | Performed by: FAMILY MEDICINE

## 2024-02-02 PROCEDURE — 1123F ACP DISCUSS/DSCN MKR DOCD: CPT | Performed by: FAMILY MEDICINE

## 2024-02-02 PROCEDURE — 3078F DIAST BP <80 MM HG: CPT | Performed by: FAMILY MEDICINE

## 2024-02-02 PROCEDURE — 1111F DSCHRG MED/CURRENT MED MERGE: CPT | Performed by: FAMILY MEDICINE

## 2024-02-02 PROCEDURE — 1125F AMNT PAIN NOTED PAIN PRSNT: CPT | Performed by: FAMILY MEDICINE

## 2024-02-02 PROCEDURE — 1036F TOBACCO NON-USER: CPT | Performed by: FAMILY MEDICINE

## 2024-02-02 PROCEDURE — 99214 OFFICE O/P EST MOD 30 MIN: CPT | Performed by: FAMILY MEDICINE

## 2024-02-02 PROCEDURE — 3074F SYST BP LT 130 MM HG: CPT | Performed by: FAMILY MEDICINE

## 2024-02-02 PROCEDURE — G0151 HHCP-SERV OF PT,EA 15 MIN: HCPCS

## 2024-02-02 ASSESSMENT — ENCOUNTER SYMPTOMS
NAUSEA: 0
FEVER: 0
DIFFICULTY URINATING: 0
SUBJECTIVE PAIN PROGRESSION: UNCHANGED
ADENOPATHY: 0
BRUISES/BLEEDS EASILY: 0
PERSON REPORTING PAIN: PATIENT
CHEST TIGHTNESS: 0
MYALGIAS: 0
DYSPHORIC MOOD: 0
SHORTNESS OF BREATH: 0
ARTHRALGIAS: 0
PAIN: 1
DYSURIA: 0
CHILLS: 0
NERVOUS/ANXIOUS: 0
FATIGUE: 0
ABDOMINAL DISTENTION: 0
LIGHT-HEADEDNESS: 0
WHEEZING: 0
ABDOMINAL PAIN: 0
HEADACHES: 0
APPETITE CHANGE: 0
SLEEP DISTURBANCE: 0
SINUS PRESSURE: 0
DIARRHEA: 0

## 2024-02-02 ASSESSMENT — ACTIVITIES OF DAILY LIVING (ADL)
AMBULATION ASSISTANCE ON FLAT SURFACES: 1
AMBULATION_DISTANCE/DURATION_TOLERATED: HOUSEHOLD

## 2024-02-02 NOTE — PROGRESS NOTES
Subjective   Patient ID: Aicha Lord is a 89 y.o. female who presents for Hospital Follow-up.  Pt her for hospital fu from [facility] 1/27-29 . She was in with SOB, found to have combined CHF. Tx with IV lasix.  Labs showed stable CRI, mild anemia. Glu higher in hospital now better, 120-180s. Pt reports symptoms are improving. Wt down about 9#. Still has edema but much better. No abdominal distension. SOB improved. BS improving in 120-180s range.    Discharge Facility:[facility]  Discharge Diagnosis:CHF dyspnea  Admission Date:01/27/24  Discharge Date: 01/29/24    PCP Appointment Date:none available sent to pcp office  Specialist Appointment Date:   Hospital Encounter and Summary: Linked  See discharge assessment below for further details  Engagement  Call Start Time: 0853 (1/30/2024  8:53 AM)    Medications  Medications reviewed with patient/caregiver?: Yes (1/30/2024  8:53 AM)  Is the patient having any side effects they believe may be caused by any medication additions or changes?: No (1/30/2024  8:53 AM)  Does the patient have all medications ordered at discharge?: Not applicable (1/30/2024  8:53 AM)  Is the patient taking all medications as directed (includes completed medication regime)?: Yes (1/30/2024  8:53 AM)    Appointments  Does the patient have a primary care provider?: Yes (1/30/2024  8:53 AM)  Care Management Interventions: Advised patient to make appointment (1/30/2024  8:53 AM)    Self Management  Has home health visited the patient within 72 hours of discharge?: No (will be calling today) (1/30/2024  8:53 AM)  Has all Durable Medical Equipment (DME) been delivered?: No (1/30/2024  8:53 AM)    Patient Teaching  Does the patient have access to their discharge instructions?: Yes (1/30/2024  8:53 AM)  Care Management Interventions: Reviewed instructions with patient (reviewed with caregiver) (1/30/2024  8:53 AM)  What is the patient's perception of their health status since discharge?: Same (per  "caregiver shes doing a little better than when she was in the hospital .) (1/30/2024  8:53 AM)    Wrap Up  Call End Time: 0900 (1/30/2024  8:53 AM)      Review of Systems   Constitutional:  Negative for appetite change, chills, fatigue and fever.   HENT:  Negative for congestion, ear pain and sinus pressure.    Eyes:  Negative for visual disturbance.   Respiratory:  Negative for chest tightness, shortness of breath and wheezing.    Cardiovascular:  Positive for leg swelling. Negative for chest pain.   Gastrointestinal:  Negative for abdominal distention, abdominal pain, diarrhea and nausea.   Genitourinary:  Negative for difficulty urinating, dysuria and pelvic pain.   Musculoskeletal:  Negative for arthralgias and myalgias.   Skin:  Negative for rash.   Allergic/Immunologic: Negative for immunocompromised state.   Neurological:  Negative for light-headedness and headaches.   Hematological:  Negative for adenopathy. Does not bruise/bleed easily.   Psychiatric/Behavioral:  Negative for dysphoric mood and sleep disturbance. The patient is not nervous/anxious.        Objective   /56   Pulse 55   Resp 16   Ht 1.499 m (4' 11\")   Wt 60.8 kg (134 lb)   SpO2 98%   BMI 27.06 kg/m²    Physical Exam  Constitutional:       General: She is not in acute distress.     Appearance: Normal appearance.   Cardiovascular:      Rate and Rhythm: Normal rate and regular rhythm.      Heart sounds: Normal heart sounds. No murmur heard.  Pulmonary:      Effort: Pulmonary effort is normal.      Breath sounds: Normal breath sounds.   Abdominal:      General: There is no distension.      Palpations: Abdomen is soft.      Tenderness: There is no abdominal tenderness.   Musculoskeletal:      Right lower leg: Edema present.      Left lower leg: Edema present.   Neurological:      Mental Status: She is alert.   Psychiatric:         Mood and Affect: Mood normal.         Judgment: Judgment normal.           Assessment/Plan   Diagnoses and " all orders for this visit:  CHF (congestive heart failure), NYHA class I, acute on chronic, combined - discussed monitoring daily weight. If weight increases by 3# or more within 1 week should take and additional 1/2 tablet Lasix in the morning until weight returns to baseline.   Recheck CXR in 3 weeks.  Type 2 diabetes mellitus with hyperglycemia- improving, continue to monitor.  Dyspnea - improving, monitor    Follow up in April as planned

## 2024-02-02 NOTE — PROGRESS NOTES
"Subjective   Patient ID: Aicha Lord is a 89 y.o. female who presents for Hospital Follow-up.    HPI     Review of Systems    Objective   /56   Pulse 55   Resp 16   Ht 1.499 m (4' 11\")   Wt 60.8 kg (134 lb)   SpO2 98%   BMI 27.06 kg/m²     Physical Exam    Assessment/Plan          "

## 2024-02-05 ENCOUNTER — HOME CARE VISIT (OUTPATIENT)
Dept: HOME HEALTH SERVICES | Facility: HOME HEALTH | Age: 89
End: 2024-02-05
Payer: MEDICARE

## 2024-02-05 PROCEDURE — G0152 HHCP-SERV OF OT,EA 15 MIN: HCPCS

## 2024-02-05 PROCEDURE — G0151 HHCP-SERV OF PT,EA 15 MIN: HCPCS

## 2024-02-05 ASSESSMENT — ENCOUNTER SYMPTOMS
DENIES PAIN: 1
SUBJECTIVE PAIN PROGRESSION: UNCHANGED
PERSON REPORTING PAIN: PATIENT
PAIN: PAIN IN RIGHT SHOULDER
PAIN: 1
PERSON REPORTING PAIN: PATIENT

## 2024-02-05 ASSESSMENT — ACTIVITIES OF DAILY LIVING (ADL)
AMBULATION ASSISTANCE ON FLAT SURFACES: 1
AMBULATION_DISTANCE/DURATION_TOLERATED: HOUSEHOLD

## 2024-02-06 ENCOUNTER — OFFICE VISIT (OUTPATIENT)
Dept: CARDIOLOGY | Facility: CLINIC | Age: 89
End: 2024-02-06
Payer: MEDICARE

## 2024-02-06 ENCOUNTER — PATIENT MESSAGE (OUTPATIENT)
Dept: CARDIOLOGY | Facility: CLINIC | Age: 89
End: 2024-02-06

## 2024-02-06 VITALS
SYSTOLIC BLOOD PRESSURE: 141 MMHG | DIASTOLIC BLOOD PRESSURE: 54 MMHG | BODY MASS INDEX: 27.04 KG/M2 | HEIGHT: 59 IN | HEART RATE: 45 BPM | TEMPERATURE: 98 F | WEIGHT: 134.1 LBS

## 2024-02-06 DIAGNOSIS — I10 HYPERTENSION, UNSPECIFIED TYPE: ICD-10-CM

## 2024-02-06 DIAGNOSIS — R00.1 BRADYCARDIA: ICD-10-CM

## 2024-02-06 DIAGNOSIS — I50.32 CHRONIC DIASTOLIC CONGESTIVE HEART FAILURE (MULTI): Primary | ICD-10-CM

## 2024-02-06 DIAGNOSIS — E78.2 MIXED HYPERLIPIDEMIA: ICD-10-CM

## 2024-02-06 DIAGNOSIS — I48.11 LONGSTANDING PERSISTENT ATRIAL FIBRILLATION (MULTI): ICD-10-CM

## 2024-02-06 PROCEDURE — 99417 PROLNG OP E/M EACH 15 MIN: CPT | Performed by: NURSE PRACTITIONER

## 2024-02-06 PROCEDURE — 1125F AMNT PAIN NOTED PAIN PRSNT: CPT | Performed by: NURSE PRACTITIONER

## 2024-02-06 PROCEDURE — 1036F TOBACCO NON-USER: CPT | Performed by: NURSE PRACTITIONER

## 2024-02-06 PROCEDURE — G2212 PROLONG OUTPT/OFFICE VIS: HCPCS | Performed by: NURSE PRACTITIONER

## 2024-02-06 PROCEDURE — 1123F ACP DISCUSS/DSCN MKR DOCD: CPT | Performed by: NURSE PRACTITIONER

## 2024-02-06 PROCEDURE — 1160F RVW MEDS BY RX/DR IN RCRD: CPT | Performed by: NURSE PRACTITIONER

## 2024-02-06 PROCEDURE — 99215 OFFICE O/P EST HI 40 MIN: CPT | Performed by: NURSE PRACTITIONER

## 2024-02-06 PROCEDURE — 3078F DIAST BP <80 MM HG: CPT | Performed by: NURSE PRACTITIONER

## 2024-02-06 PROCEDURE — 1111F DSCHRG MED/CURRENT MED MERGE: CPT | Performed by: NURSE PRACTITIONER

## 2024-02-06 PROCEDURE — 3077F SYST BP >= 140 MM HG: CPT | Performed by: NURSE PRACTITIONER

## 2024-02-06 PROCEDURE — 1157F ADVNC CARE PLAN IN RCRD: CPT | Performed by: NURSE PRACTITIONER

## 2024-02-06 PROCEDURE — 1159F MED LIST DOCD IN RCRD: CPT | Performed by: NURSE PRACTITIONER

## 2024-02-06 NOTE — PROGRESS NOTES
"Chief Complaint:   Chronic Heart Failure     History Of Present Illness:    Aicha Lord is a 89 y.o. female here for hospital follow up. She presented to the hospital with sudden onset of LE edema, chest pain and BRYANT. Felt near syncopal. Noted to be volume overloaded and was subsequently diuresed. . Acute HF thought to be 2/2 dietary indiscretions.     She was then hospitalized at the beginning of January with acute diverticulitis. Was noted to have atrial fibrillation with Slow Ventricular reponse with longest pause being 2.83 seconds. Her beta blocker was held and was discharged on a 14 day monitor. Was mildly overloaded and subsequently diuresed.     Echocardiogram 1/5/2024   1. Left ventricular systolic function is normal with a 55-60% estimated ejection fraction.   2. The left atrium is severely dilated.   3. Mildly elevated RVSP.   4. Mild aortic valve stenosis.   5. Mild aortic valve regurgitation.     LE edema resolved.   Still SOB, feels fatigued.     Weights: 1/29: 135.2lb Today: 134lb    Allergies:  Celecoxib, Ciprofloxacin, Penicillins, Promethazine, Statins-hmg-coa reductase inhibitors, Sulfamethoxazole-trimethoprim, and Tramadol    Review of Systems  All pertinent systems have been reviewed and are negative except for what is stated in the history of present illness.    All other systems have been reviewed and are negative and noncontributory to this patient's current ailments.     Visit Vitals  /54   Pulse (!) 45   Temp 36.7 °C (98 °F)   Ht 1.499 m (4' 11\")   Wt 60.8 kg (134 lb 1.6 oz)   BMI 27.08 kg/m²   OB Status Postmenopausal   Smoking Status Never   BSA 1.59 m²         Objective   Vitals reviewed.   Constitutional:       Appearance: Healthy appearance. Not in distress. Frail.   HENT:      Right Ear: Decreased hearing noted.   Neck:      Vascular: No JVR. JVD normal.   Pulmonary:      Effort: Pulmonary effort is normal.      Breath sounds: Normal breath sounds. No wheezing. No " rhonchi. No rales.   Chest:      Chest wall: Not tender to palpatation.   Cardiovascular:      PMI at left midclavicular line. Bradycardia present. Irregular rhythm. Normal S1. Normal S2.       Murmurs: There is no murmur.      No gallop.  No click. No rub.   Pulses:     Intact distal pulses.   Edema:     Peripheral edema absent.   Abdominal:      General: Bowel sounds are normal.      Palpations: Abdomen is soft.      Tenderness: There is no abdominal tenderness.   Musculoskeletal:         General: No tenderness.      Comments: Walks with walker  Skin:     General: Skin is warm and dry.   Neurological:      General: No focal deficit present.      Mental Status: Alert and oriented to person, place and time.       Assessment/Plan   Diagnoses and all orders for this visit:  Chronic diastolic congestive heart failure (CMS/HCC)  - stop poli in favor of entresto  - will check cr at next visit in 2 weeks  - notes orthostatic dizziness, will decrease lasix to 60mg until she starts entresto then will decrease to 40mg with close monitoring of weights  - family will aid in monitoring weight and BP  - HF handout given   Longstanding persistent atrial fibrillation (CMS/HCC)  - continue AC  - off all beta blockers due to bradycardia   Hypertension, unspecified type  - stable   Mixed hyperlipidemia  - stable, statin  Bradycardia  - awaiting holter results to see if patient is in need of pacemaker  - will discuss at follow up    F/U in 2 weeks       Current Outpatient Medications:     acetaminophen (Tylenol) 500 mg tablet, Take 2 tablets (1,000 mg) by mouth once daily as needed for mild pain (1 - 3)., Disp: , Rfl:     calcium carbonate-vitamin D3 600 mg-10 mcg (400 unit) capsule, Take 1 tablet by mouth once daily., Disp: , Rfl:     cholecalciferol (Vitamin D-3) 25 MCG (1000 UT) capsule, Take 1 capsule (25 mcg) by mouth once daily. TAKE AS DIRECTED., Disp: , Rfl:     fluocinolone (Synalar) 0.01 % external solution, Apply 1  Application topically 2 times a day., Disp: 60 mL, Rfl: 0    furosemide (Lasix) 40 mg tablet, TAKE 1 TABLET TWICE A DAY BY  MOUTH, Disp: 180 tablet, Rfl: 3    ketotifen (Zaditor) 0.025 % (0.035 %) ophthalmic solution, Administer into affected eye(s). 1 drop in both eyes 2 times a day as needed for itching/allergies, Disp: , Rfl:     rosuvastatin (Crestor) 5 mg tablet, Take 1 tablet (5 mg) by mouth once daily., Disp: 90 tablet, Rfl: 1    vit A,C and E-lutein-minerals (Ocuvite with Lutein) 300 mcg-200 mg-27 mg-2 mg tablet, Take 1 tablet by mouth once daily., Disp: , Rfl:     Xarelto 15 mg tablet, Take 1 tablet (15 mg) by mouth once daily in the evening. Take with meals., Disp: , Rfl:     sacubitriL-valsartan (Entresto) 24-26 mg tablet, Take 1 tablet by mouth 2 times a day., Disp: 60 tablet, Rfl: 0    Exclusive of any other services or procedures performed, ICaitlyn, spent 30 minutes in duration for this visit today.  This time consisted of chart review, obtaining history, and/or performing the exam as documented above, as well as, documenting the clinical information for the encounter in the electronic record, discussing treatment options, plans, and/or goals with patient, family, and/or caregiver, refilling medications, updating the electronic record, ordering medicines, lab work, imaging, referrals, and/or procedures as documented above and communicating with other [facility] professionals. I have discussed the results of laboratory, radiology, and cardiology studies with the patient and their family/caregiver.

## 2024-02-07 ENCOUNTER — TELEPHONE (OUTPATIENT)
Dept: CARDIOLOGY | Facility: CLINIC | Age: 89
End: 2024-02-07
Payer: MEDICARE

## 2024-02-07 ENCOUNTER — HOME CARE VISIT (OUTPATIENT)
Dept: HOME HEALTH SERVICES | Facility: HOME HEALTH | Age: 89
End: 2024-02-07
Payer: MEDICARE

## 2024-02-07 PROCEDURE — G0151 HHCP-SERV OF PT,EA 15 MIN: HCPCS

## 2024-02-07 ASSESSMENT — ACTIVITIES OF DAILY LIVING (ADL)
AMBULATION ASSISTANCE ON FLAT SURFACES: 1
AMBULATION_DISTANCE/DURATION_TOLERATED: HOUSEHOLD

## 2024-02-07 ASSESSMENT — ENCOUNTER SYMPTOMS
DENIES PAIN: 1
PERSON REPORTING PAIN: PATIENT

## 2024-02-08 ENCOUNTER — HOME CARE VISIT (OUTPATIENT)
Dept: HOME HEALTH SERVICES | Facility: HOME HEALTH | Age: 89
End: 2024-02-08
Payer: MEDICARE

## 2024-02-08 PROCEDURE — G0152 HHCP-SERV OF OT,EA 15 MIN: HCPCS

## 2024-02-08 ASSESSMENT — ENCOUNTER SYMPTOMS: DENIES PAIN: 1

## 2024-02-09 ENCOUNTER — PATIENT OUTREACH (OUTPATIENT)
Dept: CARE COORDINATION | Facility: CLINIC | Age: 89
End: 2024-02-09
Payer: MEDICARE

## 2024-02-09 NOTE — PROGRESS NOTES
Unable to reach patient for call back after patient's follow up appointment with PCP.   ISAMARM with call back number for patient to call if needed   If no voicemail available call attempts x 2 were made to contact the patient to assist with any questions or concerns patient may have.

## 2024-02-12 ENCOUNTER — OFFICE VISIT (OUTPATIENT)
Dept: OPHTHALMOLOGY | Facility: CLINIC | Age: 89
End: 2024-02-12
Payer: MEDICARE

## 2024-02-12 DIAGNOSIS — H35.3211 WET AGE-RELATED MACULAR DEGENERATION OF RIGHT EYE WITH ACTIVE CHOROIDAL NEOVASCULARIZATION (MULTI): Primary | ICD-10-CM

## 2024-02-12 DIAGNOSIS — H43.22 ASTEROID HYALOSIS OF LEFT EYE: ICD-10-CM

## 2024-02-12 DIAGNOSIS — H35.3134 ADVANCED ATROPHIC NONEXUDATIVE AGE-RELATED MACULAR DEGENERATION OF BOTH EYES WITH SUBFOVEAL INVOLVEMENT: ICD-10-CM

## 2024-02-12 DIAGNOSIS — H35.30 AGE-RELATED MACULAR DEGENERATION: ICD-10-CM

## 2024-02-12 DIAGNOSIS — H25.12 AGE-RELATED NUCLEAR CATARACT, LEFT: ICD-10-CM

## 2024-02-12 PROCEDURE — 92134 CPTRZ OPH DX IMG PST SGM RTA: CPT | Mod: BILATERAL PROCEDURE | Performed by: OPHTHALMOLOGY

## 2024-02-12 PROCEDURE — 99214 OFFICE O/P EST MOD 30 MIN: CPT | Performed by: OPHTHALMOLOGY

## 2024-02-12 ASSESSMENT — ENCOUNTER SYMPTOMS
ALLERGIC/IMMUNOLOGIC NEGATIVE: 0
EYES NEGATIVE: 1
GASTROINTESTINAL NEGATIVE: 1
PSYCHIATRIC NEGATIVE: 0
HEMATOLOGIC/LYMPHATIC NEGATIVE: 0
NEUROLOGICAL NEGATIVE: 1
CARDIOVASCULAR NEGATIVE: 0
RESPIRATORY NEGATIVE: 0
ENDOCRINE NEGATIVE: 0
CONSTITUTIONAL NEGATIVE: 0
MUSCULOSKELETAL NEGATIVE: 0

## 2024-02-12 ASSESSMENT — VISUAL ACUITY
CORRECTION_TYPE: GLASSES
OD_CC: 20/50
METHOD: SNELLEN - LINEAR
OS_CC: 20/100

## 2024-02-12 ASSESSMENT — SLIT LAMP EXAM - LIDS
COMMENTS: NORMAL
COMMENTS: NORMAL

## 2024-02-12 ASSESSMENT — EXTERNAL EXAM - RIGHT EYE: OD_EXAM: NORMAL

## 2024-02-12 ASSESSMENT — TONOMETRY
OD_IOP_MMHG: 19
OS_IOP_MMHG: 19
IOP_METHOD: TONOPEN

## 2024-02-12 ASSESSMENT — EXTERNAL EXAM - LEFT EYE: OS_EXAM: NORMAL

## 2024-02-12 NOTE — PROGRESS NOTES
Assessment/Plan   Diagnoses and all orders for this visit:  Wet age-related macular degeneration of right eye with active choroidal neovascularization (CMS/HCC)  -     OCT, Retina - OU - Both Eyes  Advanced atrophic nonexudative age-related macular degeneration of both eyes with subfoveal involvement  Age-related macular degeneration  Asteroid hyalosis of left eye  Age-related nuclear cataract, left  \  today has subretinal fluid (SRF) CME PED left                 OCT : 02/12/24     OD: Normal Foveal Contour & Retinal laminations, EZ line preserved, (-) ?IRF/subretinal fluid (SRF), CST-WNL  OS: Normal Foveal Contour & Retinal laminations, EZ line preserved, (-) IRF/subretinal fluid (SRF), CST-WNL      Today she is recovering form delirium    Today avoid any procedures    When return will plan for Eylea oU

## 2024-02-13 ENCOUNTER — HOME CARE VISIT (OUTPATIENT)
Dept: HOME HEALTH SERVICES | Facility: HOME HEALTH | Age: 89
End: 2024-02-13
Payer: MEDICARE

## 2024-02-13 PROCEDURE — G0152 HHCP-SERV OF OT,EA 15 MIN: HCPCS

## 2024-02-13 PROCEDURE — 0023 HH SOC

## 2024-02-13 PROCEDURE — G0151 HHCP-SERV OF PT,EA 15 MIN: HCPCS

## 2024-02-13 ASSESSMENT — ENCOUNTER SYMPTOMS
PAIN LOCATION - EXACERBATING FACTORS: SUPINE IN BED
PERSON REPORTING PAIN: PATIENT
PAIN: 1
PAIN: 1
PAIN LOCATION: PELVIS
PAIN LOCATION - RELIEVING FACTORS: HEAT
PAIN LOCATION: RIGHT HIP
SUBJECTIVE PAIN PROGRESSION: WAXING AND WANING
PERSON REPORTING PAIN: PATIENT
PAIN LOCATION - PAIN SEVERITY: 3/10
PAIN LOCATION - PAIN SEVERITY: 7/10
HIGHEST PAIN SEVERITY IN PAST 24 HOURS: 7/10

## 2024-02-13 ASSESSMENT — ACTIVITIES OF DAILY LIVING (ADL)
AMBULATION_DISTANCE/DURATION_TOLERATED: HOUSEHOLD
AMBULATION ASSISTANCE ON FLAT SURFACES: 1

## 2024-02-15 ENCOUNTER — HOME CARE VISIT (OUTPATIENT)
Dept: HOME HEALTH SERVICES | Facility: HOME HEALTH | Age: 89
End: 2024-02-15
Payer: MEDICARE

## 2024-02-15 LAB — BODY SURFACE AREA: 1.59 M2

## 2024-02-15 PROCEDURE — G0151 HHCP-SERV OF PT,EA 15 MIN: HCPCS

## 2024-02-15 PROCEDURE — G0152 HHCP-SERV OF OT,EA 15 MIN: HCPCS

## 2024-02-15 ASSESSMENT — ACTIVITIES OF DAILY LIVING (ADL)
AMBULATION_DISTANCE/DURATION_TOLERATED: HOUSEHOLD
AMBULATION ASSISTANCE ON FLAT SURFACES: 1

## 2024-02-15 ASSESSMENT — ENCOUNTER SYMPTOMS
DENIES PAIN: 1
PERSON REPORTING PAIN: PATIENT
DENIES PAIN: 1

## 2024-02-16 ENCOUNTER — LAB (OUTPATIENT)
Dept: LAB | Facility: LAB | Age: 89
End: 2024-02-16
Payer: MEDICARE

## 2024-02-19 ENCOUNTER — LAB (OUTPATIENT)
Dept: LAB | Facility: LAB | Age: 89
End: 2024-02-19
Payer: MEDICARE

## 2024-02-19 DIAGNOSIS — K57.92 DIVERTICULITIS: ICD-10-CM

## 2024-02-19 DIAGNOSIS — I50.9 CHRONIC HEART FAILURE, UNSPECIFIED HEART FAILURE TYPE (MULTI): Primary | ICD-10-CM

## 2024-02-19 DIAGNOSIS — R78.81 BACTEREMIA: ICD-10-CM

## 2024-02-19 DIAGNOSIS — I50.9 CHRONIC HEART FAILURE, UNSPECIFIED HEART FAILURE TYPE (MULTI): ICD-10-CM

## 2024-02-19 LAB
ANION GAP SERPL CALC-SCNC: 9 MMOL/L (ref 10–20)
BASOPHILS # BLD AUTO: 0.03 X10*3/UL (ref 0–0.1)
BASOPHILS NFR BLD AUTO: 0.5 %
BUN SERPL-MCNC: 31 MG/DL (ref 6–23)
CALCIUM SERPL-MCNC: 10.6 MG/DL (ref 8.6–10.3)
CHLORIDE SERPL-SCNC: 105 MMOL/L (ref 98–107)
CO2 SERPL-SCNC: 28 MMOL/L (ref 21–32)
CREAT SERPL-MCNC: 1.15 MG/DL (ref 0.5–1.05)
EGFRCR SERPLBLD CKD-EPI 2021: 45 ML/MIN/1.73M*2
EOSINOPHIL # BLD AUTO: 0.09 X10*3/UL (ref 0–0.4)
EOSINOPHIL NFR BLD AUTO: 1.6 %
ERYTHROCYTE [DISTWIDTH] IN BLOOD BY AUTOMATED COUNT: 16.8 % (ref 11.5–14.5)
GLUCOSE SERPL-MCNC: 205 MG/DL (ref 74–99)
HCT VFR BLD AUTO: 41.3 % (ref 36–46)
HGB BLD-MCNC: 12.3 G/DL (ref 12–16)
IMM GRANULOCYTES # BLD AUTO: 0.01 X10*3/UL (ref 0–0.5)
IMM GRANULOCYTES NFR BLD AUTO: 0.2 % (ref 0–0.9)
LYMPHOCYTES # BLD AUTO: 1.49 X10*3/UL (ref 0.8–3)
LYMPHOCYTES NFR BLD AUTO: 26.5 %
MCH RBC QN AUTO: 27.3 PG (ref 26–34)
MCHC RBC AUTO-ENTMCNC: 29.8 G/DL (ref 32–36)
MCV RBC AUTO: 92 FL (ref 80–100)
MONOCYTES # BLD AUTO: 0.31 X10*3/UL (ref 0.05–0.8)
MONOCYTES NFR BLD AUTO: 5.5 %
NEUTROPHILS # BLD AUTO: 3.7 X10*3/UL (ref 1.6–5.5)
NEUTROPHILS NFR BLD AUTO: 65.7 %
NRBC BLD-RTO: 0 /100 WBCS (ref 0–0)
PLATELET # BLD AUTO: 191 X10*3/UL (ref 150–450)
POTASSIUM SERPL-SCNC: 4.4 MMOL/L (ref 3.5–5.3)
RBC # BLD AUTO: 4.5 X10*6/UL (ref 4–5.2)
SODIUM SERPL-SCNC: 138 MMOL/L (ref 136–145)
WBC # BLD AUTO: 5.6 X10*3/UL (ref 4.4–11.3)

## 2024-02-19 PROCEDURE — 36415 COLL VENOUS BLD VENIPUNCTURE: CPT

## 2024-02-19 PROCEDURE — 80048 BASIC METABOLIC PNL TOTAL CA: CPT

## 2024-02-19 PROCEDURE — 85025 COMPLETE CBC W/AUTO DIFF WBC: CPT

## 2024-02-20 ENCOUNTER — HOME CARE VISIT (OUTPATIENT)
Dept: HOME HEALTH SERVICES | Facility: HOME HEALTH | Age: 89
End: 2024-02-20
Payer: MEDICARE

## 2024-02-20 PROCEDURE — G0152 HHCP-SERV OF OT,EA 15 MIN: HCPCS

## 2024-02-20 PROCEDURE — G0151 HHCP-SERV OF PT,EA 15 MIN: HCPCS

## 2024-02-20 ASSESSMENT — ENCOUNTER SYMPTOMS
DENIES PAIN: 1
PERSON REPORTING PAIN: PATIENT

## 2024-02-20 ASSESSMENT — ACTIVITIES OF DAILY LIVING (ADL)
AMBULATION_DISTANCE/DURATION_TOLERATED: HOUSEHOLD
AMBULATION ASSISTANCE ON FLAT SURFACES: 1

## 2024-02-20 NOTE — HOME HEALTH
"OT Treatment: This treatment completed by ANNITA Kramer/ZULAY with LUIS FERNANDO Quintanilla/S. Patient reports, \"I've got a new pain in my [right] hip in the last couple of weeks.\" Patient reports dressing, washing face, and brushing hair independently this date. Patient expresses desire to return home as soon as possible with family reporting plan for patient to return home following successful completion of home care OT and PT. Patient ambulated recliner chair <> restroom with cane/Sup A. Patient completed oral hygiene independently standing at sink and performed toilet transfer with toilet safety frame Mod I. Patient performed BUE strengthening with 1 lb weights seated in recliner chair, including shoulder flexion/abduction, bicep curls, wrist flexion/extension, finger flexion/extension, tricep extensions, and forearm supination/pronation. Patient response: Regarding R shoulder, patient states, \"It doesn't hurt, it just feels weird.\" Continue POC."

## 2024-02-22 ENCOUNTER — HOME CARE VISIT (OUTPATIENT)
Dept: HOME HEALTH SERVICES | Facility: HOME HEALTH | Age: 89
End: 2024-02-22
Payer: MEDICARE

## 2024-02-22 ENCOUNTER — OFFICE VISIT (OUTPATIENT)
Dept: CARDIOLOGY | Facility: CLINIC | Age: 89
End: 2024-02-22
Payer: MEDICARE

## 2024-02-22 VITALS
HEART RATE: 77 BPM | TEMPERATURE: 97.6 F | OXYGEN SATURATION: 98 % | DIASTOLIC BLOOD PRESSURE: 59 MMHG | SYSTOLIC BLOOD PRESSURE: 118 MMHG

## 2024-02-22 VITALS
SYSTOLIC BLOOD PRESSURE: 115 MMHG | HEIGHT: 58 IN | WEIGHT: 139 LBS | HEART RATE: 77 BPM | BODY MASS INDEX: 29.18 KG/M2 | DIASTOLIC BLOOD PRESSURE: 60 MMHG

## 2024-02-22 DIAGNOSIS — R00.1 BRADYCARDIA: ICD-10-CM

## 2024-02-22 DIAGNOSIS — I10 HYPERTENSION, UNSPECIFIED TYPE: ICD-10-CM

## 2024-02-22 DIAGNOSIS — I48.11 LONGSTANDING PERSISTENT ATRIAL FIBRILLATION (MULTI): ICD-10-CM

## 2024-02-22 DIAGNOSIS — E78.5 HYPERLIPIDEMIA, UNSPECIFIED HYPERLIPIDEMIA TYPE: ICD-10-CM

## 2024-02-22 DIAGNOSIS — I50.32 CHRONIC DIASTOLIC CONGESTIVE HEART FAILURE (MULTI): Primary | ICD-10-CM

## 2024-02-22 PROCEDURE — 3074F SYST BP LT 130 MM HG: CPT | Performed by: NURSE PRACTITIONER

## 2024-02-22 PROCEDURE — 1159F MED LIST DOCD IN RCRD: CPT | Performed by: NURSE PRACTITIONER

## 2024-02-22 PROCEDURE — 1123F ACP DISCUSS/DSCN MKR DOCD: CPT | Performed by: NURSE PRACTITIONER

## 2024-02-22 PROCEDURE — G0151 HHCP-SERV OF PT,EA 15 MIN: HCPCS

## 2024-02-22 PROCEDURE — G0152 HHCP-SERV OF OT,EA 15 MIN: HCPCS

## 2024-02-22 PROCEDURE — 3078F DIAST BP <80 MM HG: CPT | Performed by: NURSE PRACTITIONER

## 2024-02-22 PROCEDURE — 99215 OFFICE O/P EST HI 40 MIN: CPT | Performed by: NURSE PRACTITIONER

## 2024-02-22 PROCEDURE — 1111F DSCHRG MED/CURRENT MED MERGE: CPT | Performed by: NURSE PRACTITIONER

## 2024-02-22 PROCEDURE — 1036F TOBACCO NON-USER: CPT | Performed by: NURSE PRACTITIONER

## 2024-02-22 PROCEDURE — 1160F RVW MEDS BY RX/DR IN RCRD: CPT | Performed by: NURSE PRACTITIONER

## 2024-02-22 PROCEDURE — 1157F ADVNC CARE PLAN IN RCRD: CPT | Performed by: NURSE PRACTITIONER

## 2024-02-22 PROCEDURE — 1125F AMNT PAIN NOTED PAIN PRSNT: CPT | Performed by: NURSE PRACTITIONER

## 2024-02-22 ASSESSMENT — ENCOUNTER SYMPTOMS
PAIN LOCATION - PAIN SEVERITY: 5/10
PAIN: 1
PAIN LOCATION - RELIEVING FACTORS: SITTING
PAIN LOCATION - EXACERBATING FACTORS: WALKING
PAIN LOCATION - PAIN FREQUENCY: CONSTANT
DENIES PAIN: 1
PAIN LOCATION: BACK
PERSON REPORTING PAIN: PATIENT
PERSON REPORTING PAIN: PATIENT

## 2024-02-22 ASSESSMENT — ACTIVITIES OF DAILY LIVING (ADL)
AMBULATION ASSISTANCE ON FLAT SURFACES: 1
AMBULATION_DISTANCE/DURATION_TOLERATED: HOUSEHOLD

## 2024-02-22 NOTE — PROGRESS NOTES
"Chief Complaint:   Chronic Heart Failure     History Of Present Illness:    Aicha Lord is a 89 y.o. female here for hospital follow up. She presented to the hospital with sudden onset of LE edema, chest pain and BRYANT. Felt near syncopal. Noted to be volume overloaded and was subsequently diuresed. . Acute HF thought to be 2/2 dietary indiscretions.     She was then hospitalized at the beginning of January with acute diverticulitis. Was noted to have atrial fibrillation with Slow Ventricular reponse with longest pause being 2.83 seconds. Her beta blocker was held and was discharged on a 14 day monitor. Was mildly overloaded and subsequently diuresed.     Monitor showed 100% afib burden with longest pause 2.7 seconds. Avg 57bpm.     Weight was holding steady for weeks was 133lb. Today was 139lb and yesterday was 138lb. Denies change in SOB. Some edema in b/l calves.     Echocardiogram 1/5/2024   1. Left ventricular systolic function is normal with a 55-60% estimated ejection fraction.   2. The left atrium is severely dilated.   3. Mildly elevated RVSP.   4. Mild aortic valve stenosis.   5. Mild aortic valve regurgitation.       Allergies:  Celecoxib, Ciprofloxacin, Penicillins, Promethazine, Statins-hmg-coa reductase inhibitors, Sulfamethoxazole-trimethoprim, and Tramadol    Review of Systems  All pertinent systems have been reviewed and are negative except for what is stated in the history of present illness.    All other systems have been reviewed and are negative and noncontributory to this patient's current ailments.     Visit Vitals  /60 (BP Location: Right arm, Patient Position: Sitting, BP Cuff Size: Adult)   Pulse 77   Ht 1.473 m (4' 10\")   Wt 63 kg (139 lb)   BMI 29.05 kg/m²   OB Status Postmenopausal   Smoking Status Never   BSA 1.61 m²         Objective   Vitals reviewed.   Constitutional:       Appearance: Healthy appearance. Not in distress. Frail.   HENT:      Right Ear: Decreased " hearing noted.   Neck:      Vascular: No JVR. JVD normal.   Pulmonary:      Effort: Pulmonary effort is normal.      Breath sounds: Normal breath sounds. No wheezing. No rhonchi. No rales.   Chest:      Chest wall: Not tender to palpatation.   Cardiovascular:      PMI at left midclavicular line. Bradycardia present. Irregular rhythm. Normal S1. Normal S2.       Murmurs: There is no murmur.      No gallop.  No click. No rub.   Pulses:     Intact distal pulses.   Edema:     Peripheral edema present.     Pretibial: bilateral 2+ edema of the pretibial area.  Abdominal:      General: Bowel sounds are normal.      Palpations: Abdomen is soft.      Tenderness: There is no abdominal tenderness.   Musculoskeletal:         General: No tenderness.      Comments: Walks with walker  Skin:     General: Skin is warm and dry.   Neurological:      General: No focal deficit present.      Mental Status: Alert and oriented to person, place and time.       Assessment/Plan   Diagnoses and all orders for this visit:  Chronic diastolic congestive heart failure (CMS/HCC)  - tolerating entresto  - mildly overloaded via weight  - family to give 60mg of lasix until weight improves then decrease back to 40mg po daily   Longstanding persistent atrial fibrillation (CMS/HCC)  - continue AC  - off all beta blockers due to bradycardia, pause <3 seconds   Hypertension, unspecified type  - stable   Mixed hyperlipidemia  - stable, statin  Bradycardia  - pacemaker not indicated       Current Outpatient Medications:     acetaminophen (Tylenol) 500 mg tablet, Take 2 tablets (1,000 mg) by mouth once daily as needed for mild pain (1 - 3)., Disp: , Rfl:     calcium carbonate-vitamin D3 600 mg-10 mcg (400 unit) capsule, Take 1 tablet by mouth once daily., Disp: , Rfl:     cholecalciferol (Vitamin D-3) 25 MCG (1000 UT) capsule, Take 1 capsule (25 mcg) by mouth once daily. TAKE AS DIRECTED., Disp: , Rfl:     fluocinolone (Synalar) 0.01 % external solution,  Apply 1 Application topically 2 times a day., Disp: 60 mL, Rfl: 0    furosemide (Lasix) 40 mg tablet, TAKE 1 TABLET TWICE A DAY BY  MOUTH, Disp: 180 tablet, Rfl: 3    ketotifen (Zaditor) 0.025 % (0.035 %) ophthalmic solution, Administer into affected eye(s). 1 drop in both eyes 2 times a day as needed for itching/allergies, Disp: , Rfl:     rosuvastatin (Crestor) 5 mg tablet, Take 1 tablet (5 mg) by mouth once daily., Disp: 90 tablet, Rfl: 1    sacubitriL-valsartan (Entresto) 24-26 mg tablet, Take 1 tablet by mouth 2 times a day., Disp: 60 tablet, Rfl: 0    vit A,C and E-lutein-minerals (Ocuvite with Lutein) 300 mcg-200 mg-27 mg-2 mg tablet, Take 1 tablet by mouth once daily., Disp: , Rfl:     Xarelto 15 mg tablet, Take 1 tablet (15 mg) by mouth once daily in the evening. Take with meals., Disp: , Rfl:     Exclusive of any other services or procedures performed, I, Caitlyn MADRID, spent 40 minutes in duration for this visit today.  This time consisted of chart review, obtaining history, and/or performing the exam as documented above, as well as, documenting the clinical information for the encounter in the electronic record, discussing treatment options, plans, and/or goals with patient, family, and/or caregiver, refilling medications, updating the electronic record, ordering medicines, lab work, imaging, referrals, and/or procedures as documented above and communicating with other [facility] professionals. I have discussed the results of laboratory, radiology, and cardiology studies with the patient and their family/caregiver.

## 2024-02-27 ENCOUNTER — PATIENT OUTREACH (OUTPATIENT)
Dept: CARE COORDINATION | Facility: CLINIC | Age: 89
End: 2024-02-27
Payer: MEDICARE

## 2024-02-27 ENCOUNTER — HOME CARE VISIT (OUTPATIENT)
Dept: HOME HEALTH SERVICES | Facility: HOME HEALTH | Age: 89
End: 2024-02-27
Payer: MEDICARE

## 2024-02-27 PROCEDURE — G0157 HHC PT ASSISTANT EA 15: HCPCS

## 2024-02-27 PROCEDURE — G0152 HHCP-SERV OF OT,EA 15 MIN: HCPCS

## 2024-02-27 SDOH — HEALTH STABILITY: PHYSICAL HEALTH: PHYSICAL EXERCISE: STANDING

## 2024-02-27 SDOH — HEALTH STABILITY: PHYSICAL HEALTH: PHYSICAL EXERCISE: SUPINE

## 2024-02-27 SDOH — HEALTH STABILITY: PHYSICAL HEALTH: EXERCISE ACTIVITY: B LES

## 2024-02-27 SDOH — HEALTH STABILITY: PHYSICAL HEALTH: EXERCISE TYPE: STRENTHENING B LES

## 2024-02-27 SDOH — HEALTH STABILITY: PHYSICAL HEALTH: PHYSICAL EXERCISE: 10

## 2024-02-27 SDOH — HEALTH STABILITY: PHYSICAL HEALTH: EXERCISE ACTIVITIES SETS: 2

## 2024-02-27 SDOH — HEALTH STABILITY: PHYSICAL HEALTH: PHYSICAL EXERCISE: SEATED

## 2024-02-27 ASSESSMENT — ENCOUNTER SYMPTOMS: DENIES PAIN: 1

## 2024-02-27 ASSESSMENT — ACTIVITIES OF DAILY LIVING (ADL)
AMBULATION ASSISTANCE ON FLAT SURFACES: 1
AMBULATION_DISTANCE/DURATION_TOLERATED: UNDER 100 FT X 2

## 2024-02-29 ENCOUNTER — HOME CARE VISIT (OUTPATIENT)
Dept: HOME HEALTH SERVICES | Facility: HOME HEALTH | Age: 89
End: 2024-02-29
Payer: MEDICARE

## 2024-02-29 PROCEDURE — G0157 HHC PT ASSISTANT EA 15: HCPCS

## 2024-02-29 SDOH — HEALTH STABILITY: PHYSICAL HEALTH: EXERCISE ACTIVITIES SETS: 2

## 2024-02-29 SDOH — HEALTH STABILITY: PHYSICAL HEALTH: EXERCISE ACTIVITY: B LES

## 2024-02-29 SDOH — HEALTH STABILITY: PHYSICAL HEALTH: EXERCISE TYPE: STRENGTHENING B LES

## 2024-02-29 SDOH — HEALTH STABILITY: PHYSICAL HEALTH: PHYSICAL EXERCISE: 10

## 2024-02-29 SDOH — HEALTH STABILITY: PHYSICAL HEALTH: PHYSICAL EXERCISE: STANDING

## 2024-02-29 SDOH — HEALTH STABILITY: PHYSICAL HEALTH: PHYSICAL EXERCISE: SUPINE

## 2024-02-29 SDOH — HEALTH STABILITY: PHYSICAL HEALTH: PHYSICAL EXERCISE: SEATED

## 2024-02-29 ASSESSMENT — ACTIVITIES OF DAILY LIVING (ADL)
AMBULATION ASSISTANCE ON FLAT SURFACES: 1
AMBULATION_DISTANCE/DURATION_TOLERATED: 100 FT X 2

## 2024-03-05 ENCOUNTER — HOME CARE VISIT (OUTPATIENT)
Dept: HOME HEALTH SERVICES | Facility: HOME HEALTH | Age: 89
End: 2024-03-05
Payer: MEDICARE

## 2024-03-05 PROCEDURE — G0157 HHC PT ASSISTANT EA 15: HCPCS

## 2024-03-05 SDOH — HEALTH STABILITY: PHYSICAL HEALTH: PHYSICAL EXERCISE: 10

## 2024-03-05 SDOH — HEALTH STABILITY: PHYSICAL HEALTH: EXERCISE ACTIVITY: B LES

## 2024-03-05 SDOH — HEALTH STABILITY: PHYSICAL HEALTH: EXERCISE TYPE: STRENGTHENING B LES

## 2024-03-05 SDOH — HEALTH STABILITY: PHYSICAL HEALTH: PHYSICAL EXERCISE: SEATED

## 2024-03-05 SDOH — HEALTH STABILITY: PHYSICAL HEALTH: EXERCISE ACTIVITIES SETS: 2

## 2024-03-05 SDOH — HEALTH STABILITY: PHYSICAL HEALTH: PHYSICAL EXERCISE: SUPINE

## 2024-03-05 SDOH — HEALTH STABILITY: PHYSICAL HEALTH: PHYSICAL EXERCISE: STANDING

## 2024-03-05 ASSESSMENT — ACTIVITIES OF DAILY LIVING (ADL)
AMBULATION_DISTANCE/DURATION_TOLERATED: 100 FT X 2
AMBULATION ASSISTANCE ON FLAT SURFACES: 1

## 2024-03-06 DIAGNOSIS — I50.32 CHRONIC DIASTOLIC CONGESTIVE HEART FAILURE (MULTI): ICD-10-CM

## 2024-03-07 ENCOUNTER — HOME CARE VISIT (OUTPATIENT)
Dept: HOME HEALTH SERVICES | Facility: HOME HEALTH | Age: 89
End: 2024-03-07
Payer: MEDICARE

## 2024-03-07 DIAGNOSIS — I50.32 CHRONIC DIASTOLIC CONGESTIVE HEART FAILURE (MULTI): ICD-10-CM

## 2024-03-07 PROCEDURE — G0151 HHCP-SERV OF PT,EA 15 MIN: HCPCS

## 2024-03-07 NOTE — TELEPHONE ENCOUNTER
Pt granddaughter called to say pt is out of entresto and needs a month worth waiting for optum.  I called it into [facility] to have it refilled

## 2024-03-09 ASSESSMENT — ACTIVITIES OF DAILY LIVING (ADL)
OASIS_M1830: 01
HOME_HEALTH_OASIS: 00

## 2024-03-09 ASSESSMENT — ENCOUNTER SYMPTOMS
DENIES PAIN: 1
PERSON REPORTING PAIN: PATIENT

## 2024-03-25 ENCOUNTER — OFFICE VISIT (OUTPATIENT)
Dept: OPHTHALMOLOGY | Facility: CLINIC | Age: 89
End: 2024-03-25
Payer: MEDICARE

## 2024-03-25 DIAGNOSIS — H35.30 AGE-RELATED MACULAR DEGENERATION: Primary | ICD-10-CM

## 2024-03-25 DIAGNOSIS — H35.3231 EXUDATIVE AGE-RELATED MACULAR DEGENERATION OF BOTH EYES WITH ACTIVE CHOROIDAL NEOVASCULARIZATION (MULTI): ICD-10-CM

## 2024-03-25 PROCEDURE — 92134 CPTRZ OPH DX IMG PST SGM RTA: CPT | Performed by: OPHTHALMOLOGY

## 2024-03-25 PROCEDURE — 67028 INJECTION EYE DRUG: CPT | Mod: BILATERAL PROCEDURE | Performed by: OPHTHALMOLOGY

## 2024-03-25 ASSESSMENT — VISUAL ACUITY
OS_CC: 20/60
METHOD: SNELLEN - LINEAR
OD_CC: 20/80

## 2024-03-25 ASSESSMENT — CONF VISUAL FIELD
OS_INFERIOR_TEMPORAL_RESTRICTION: 0
OD_INFERIOR_NASAL_RESTRICTION: 0
OS_INFERIOR_NASAL_RESTRICTION: 0
OD_INFERIOR_TEMPORAL_RESTRICTION: 0
OS_NORMAL: 1
OS_SUPERIOR_TEMPORAL_RESTRICTION: 0
OD_SUPERIOR_TEMPORAL_RESTRICTION: 0
OS_SUPERIOR_NASAL_RESTRICTION: 0
OD_NORMAL: 1
OD_SUPERIOR_NASAL_RESTRICTION: 0

## 2024-03-25 ASSESSMENT — TONOMETRY
OS_IOP_MMHG: 19
OD_IOP_MMHG: 19
IOP_METHOD: GOLDMANN APPLANATION

## 2024-03-25 ASSESSMENT — ENCOUNTER SYMPTOMS: EYES NEGATIVE: 1

## 2024-03-25 NOTE — PROGRESS NOTES
Assessment/Plan   Diagnoses and all orders for this visit:  Age-related macular degeneration  -     OCT, Retina - OU - Both Eyes  Exudative age-related macular degeneration of both eyes with active choroidal neovascularization (CMS/HCC)  -     Intravitreal Injection, Pharmacologic Agent - OU - Both Eyes  \  today has subretinal fluid (SRF) CME PED left            DIAGNOSTIC PROCEDURE DONE    OCT DONE OD/OS            REASON FOR TEST: will help address and tailor  therapy by detecting subclinical CME SRF     Hi quality OCT  scans obtained  signal good    OCT OD - Normal Foveal Contour,  Edema, IS/OS [place]unction Normal  OCT OS - Normal Foveal Contour,?Edema, IS/OS [place] Normal    additional commnents:     Treat both eyes as it difficult for her to come     Treatment options for CNV OU  discussed, including observation, anti-VEGF injections (including Avastin, Lucentis,   Eylea  Vabysmo and  Beovu ), and  laser. Recommend anti-VEGF injections. Eylea done OU today in a sterile manner with Betadine 5% for [id number]

## 2024-03-26 ENCOUNTER — APPOINTMENT (OUTPATIENT)
Dept: CARDIOLOGY | Facility: CLINIC | Age: 89
End: 2024-03-26
Payer: MEDICARE

## 2024-03-28 ENCOUNTER — APPOINTMENT (OUTPATIENT)
Dept: PRIMARY CARE | Facility: CLINIC | Age: 89
End: 2024-03-28
Payer: MEDICARE

## 2024-04-02 ENCOUNTER — OFFICE VISIT (OUTPATIENT)
Dept: CARDIOLOGY | Facility: CLINIC | Age: 89
End: 2024-04-02
Payer: MEDICARE

## 2024-04-02 VITALS
HEART RATE: 65 BPM | SYSTOLIC BLOOD PRESSURE: 152 MMHG | BODY MASS INDEX: 28.9 KG/M2 | DIASTOLIC BLOOD PRESSURE: 55 MMHG | WEIGHT: 138.3 LBS

## 2024-04-02 DIAGNOSIS — I10 HYPERTENSION, UNSPECIFIED TYPE: ICD-10-CM

## 2024-04-02 DIAGNOSIS — R00.1 BRADYCARDIA: ICD-10-CM

## 2024-04-02 DIAGNOSIS — E78.2 MIXED HYPERLIPIDEMIA: ICD-10-CM

## 2024-04-02 DIAGNOSIS — I50.32 CHRONIC DIASTOLIC CONGESTIVE HEART FAILURE (MULTI): Primary | ICD-10-CM

## 2024-04-02 DIAGNOSIS — I48.11 LONGSTANDING PERSISTENT ATRIAL FIBRILLATION (MULTI): ICD-10-CM

## 2024-04-02 PROCEDURE — 1160F RVW MEDS BY RX/DR IN RCRD: CPT | Performed by: NURSE PRACTITIONER

## 2024-04-02 PROCEDURE — 1036F TOBACCO NON-USER: CPT | Performed by: NURSE PRACTITIONER

## 2024-04-02 PROCEDURE — 1159F MED LIST DOCD IN RCRD: CPT | Performed by: NURSE PRACTITIONER

## 2024-04-02 PROCEDURE — 3078F DIAST BP <80 MM HG: CPT | Performed by: NURSE PRACTITIONER

## 2024-04-02 PROCEDURE — 1157F ADVNC CARE PLAN IN RCRD: CPT | Performed by: NURSE PRACTITIONER

## 2024-04-02 PROCEDURE — 1123F ACP DISCUSS/DSCN MKR DOCD: CPT | Performed by: NURSE PRACTITIONER

## 2024-04-02 PROCEDURE — 3077F SYST BP >= 140 MM HG: CPT | Performed by: NURSE PRACTITIONER

## 2024-04-02 PROCEDURE — 99215 OFFICE O/P EST HI 40 MIN: CPT | Performed by: NURSE PRACTITIONER

## 2024-04-02 NOTE — PROGRESS NOTES
Chief Complaint:   Chronic Heart Failure     History Of Present Illness:    Aicha Lord is a 89 y.o. female here for hospital follow up. She presented to the hospital with sudden onset of LE edema, chest pain and BRYANT. Felt near syncopal. Noted to be volume overloaded and was subsequently diuresed. . Acute HF thought to be 2/2 dietary indiscretions.     Today, weights have been stable at home. Edema well controlled. SOB easily- relatively unchanged.      She was then hospitalized at the beginning of January with acute diverticulitis. Was noted to have atrial fibrillation with Slow Ventricular reponse with longest pause being 2.83 seconds. Her beta blocker was held and was discharged on a 14 day monitor. Was mildly overloaded and subsequently diuresed.     Monitor showed 100% afib burden with longest pause 2.7 seconds. Avg 57bpm.     Weight was holding steady for weeks was 133lb. Today was 139lb and yesterday was 138lb. Denies change in SOB. Some edema in b/l calves.     Echocardiogram 1/5/2024   1. Left ventricular systolic function is normal with a 55-60% estimated ejection fraction.   2. The left atrium is severely dilated.   3. Mildly elevated RVSP.   4. Mild aortic valve stenosis.   5. Mild aortic valve regurgitation.       Allergies:  Celecoxib, Ciprofloxacin, Penicillins, Promethazine, Statins-hmg-coa reductase inhibitors, Sulfamethoxazole-trimethoprim, and Tramadol    Review of Systems  All pertinent systems have been reviewed and are negative except for what is stated in the history of present illness.    All other systems have been reviewed and are negative and noncontributory to this patient's current ailments.     Visit Vitals  /55 (BP Location: Left arm, Patient Position: Sitting, BP Cuff Size: Adult)   Pulse 65   Wt 62.7 kg (138 lb 4.8 oz)   BMI 28.90 kg/m²   OB Status Postmenopausal   Smoking Status Never   BSA 1.6 m²       Objective   Vitals reviewed.   Constitutional:        Appearance: Healthy appearance. Not in distress. Frail.   HENT:      Right Ear: Decreased hearing noted.   Neck:      Vascular: No JVR. JVD normal.   Pulmonary:      Effort: Pulmonary effort is normal.      Breath sounds: Normal breath sounds. No wheezing. No rhonchi. No rales.   Chest:      Chest wall: Not tender to palpatation.   Cardiovascular:      PMI at left midclavicular line. Bradycardia present. Irregular rhythm. Normal S1. Normal S2.       Murmurs: There is no murmur.      No gallop.  No click. No rub.   Pulses:     Intact distal pulses.   Edema:     Peripheral edema present.     Pretibial: bilateral 2+ edema of the pretibial area.  Abdominal:      General: Bowel sounds are normal.      Palpations: Abdomen is soft.      Tenderness: There is no abdominal tenderness.   Musculoskeletal:         General: No tenderness.      Comments: Walks with walker  Skin:     General: Skin is warm and dry.   Neurological:      General: No focal deficit present.      Mental Status: Alert and oriented to person, place and time.     Assessment/Plan   Diagnoses and all orders for this visit:  Chronic diastolic congestive heart failure (CMS/HCC)  - tolerating entresto  - euvolemic on lasix  Longstanding persistent atrial fibrillation (CMS/HCC)  - continue AC  - off all beta blockers due to bradycardia, pause <3 seconds   Hypertension, unspecified type  - stable   Mixed hyperlipidemia  - stable, statin  Bradycardia  - pacemaker not indicated       Current Outpatient Medications:     acetaminophen (Tylenol) 500 mg tablet, Take 2 tablets (1,000 mg) by mouth once daily as needed for mild pain (1 - 3)., Disp: , Rfl:     calcium carbonate-vitamin D3 600 mg-10 mcg (400 unit) capsule, Take 1 tablet by mouth once daily., Disp: , Rfl:     cholecalciferol (Vitamin D-3) 25 MCG (1000 UT) capsule, Take 1 capsule (25 mcg) by mouth once daily. TAKE AS DIRECTED., Disp: , Rfl:     fluocinolone (Synalar) 0.01 % external solution, Apply 1  Application topically 2 times a day., Disp: 60 mL, Rfl: 0    furosemide (Lasix) 40 mg tablet, TAKE 1 TABLET TWICE A DAY BY  MOUTH, Disp: 180 tablet, Rfl: 3    ketotifen (Zaditor) 0.025 % (0.035 %) ophthalmic solution, Administer into affected eye(s). 1 drop in both eyes 2 times a day as needed for itching/allergies, Disp: , Rfl:     rosuvastatin (Crestor) 5 mg tablet, Take 1 tablet (5 mg) by mouth once daily., Disp: 90 tablet, Rfl: 1    sacubitriL-valsartan (Entresto) 24-26 mg tablet, Take 1 tablet by mouth 2 times a day., Disp: 180 tablet, Rfl: 2    vit A,C and E-lutein-minerals (Ocuvite with Lutein) 300 mcg-200 mg-27 mg-2 mg tablet, Take 1 tablet by mouth once daily., Disp: , Rfl:     Xarelto 15 mg tablet, Take 1 tablet (15 mg) by mouth once daily in the evening. Take with meals., Disp: , Rfl:     Exclusive of any other services or procedures performed, I, Caitlyn MADRID, spent 40 minutes in duration for this visit today.  This time consisted of chart review, obtaining history, and/or performing the exam as documented above, as well as, documenting the clinical information for the encounter in the electronic record, discussing treatment options, plans, and/or goals with patient, family, and/or caregiver, refilling medications, updating the electronic record, ordering medicines, lab work, imaging, referrals, and/or procedures as documented above and communicating with other [facility] professionals. I have discussed the results of laboratory, radiology, and cardiology studies with the patient and their family/caregiver.

## 2024-04-22 ENCOUNTER — APPOINTMENT (OUTPATIENT)
Dept: CARDIOLOGY | Facility: CLINIC | Age: 89
End: 2024-04-22
Payer: MEDICARE

## 2024-04-23 ENCOUNTER — OFFICE VISIT (OUTPATIENT)
Dept: PRIMARY CARE | Facility: CLINIC | Age: 89
End: 2024-04-23
Payer: MEDICARE

## 2024-04-23 VITALS
DIASTOLIC BLOOD PRESSURE: 72 MMHG | RESPIRATION RATE: 12 BRPM | BODY MASS INDEX: 28.43 KG/M2 | SYSTOLIC BLOOD PRESSURE: 114 MMHG | HEIGHT: 59 IN | OXYGEN SATURATION: 95 % | WEIGHT: 141 LBS

## 2024-04-23 DIAGNOSIS — E21.0 PRIMARY HYPERPARATHYROIDISM (MULTI): ICD-10-CM

## 2024-04-23 DIAGNOSIS — N18.32 STAGE 3B CHRONIC KIDNEY DISEASE (MULTI): ICD-10-CM

## 2024-04-23 DIAGNOSIS — I15.2 HYPERTENSION ASSOCIATED WITH TYPE 2 DIABETES MELLITUS (MULTI): ICD-10-CM

## 2024-04-23 DIAGNOSIS — Z00.00 HEALTHCARE MAINTENANCE: Primary | ICD-10-CM

## 2024-04-23 DIAGNOSIS — M79.7 FIBROMYALGIA: ICD-10-CM

## 2024-04-23 DIAGNOSIS — E78.2 MIXED HYPERLIPIDEMIA: ICD-10-CM

## 2024-04-23 DIAGNOSIS — H91.93 BILATERAL HEARING LOSS, UNSPECIFIED HEARING LOSS TYPE: ICD-10-CM

## 2024-04-23 DIAGNOSIS — M35.3 POLYMYALGIA RHEUMATICA (MULTI): ICD-10-CM

## 2024-04-23 DIAGNOSIS — M81.0 OSTEOPOROSIS, UNSPECIFIED OSTEOPOROSIS TYPE, UNSPECIFIED PATHOLOGICAL FRACTURE PRESENCE: ICD-10-CM

## 2024-04-23 DIAGNOSIS — I35.0 AORTIC VALVE STENOSIS, MILD: ICD-10-CM

## 2024-04-23 DIAGNOSIS — I50.32 CHRONIC DIASTOLIC CONGESTIVE HEART FAILURE (MULTI): ICD-10-CM

## 2024-04-23 DIAGNOSIS — E11.59 HYPERTENSION ASSOCIATED WITH TYPE 2 DIABETES MELLITUS (MULTI): ICD-10-CM

## 2024-04-23 PROBLEM — K86.2 PANCREATIC CYST (HHS-HCC): Status: RESOLVED | Noted: 2023-04-11 | Resolved: 2024-04-23

## 2024-04-23 PROBLEM — E11.65 TYPE 2 DIABETES MELLITUS WITH HYPERGLYCEMIA, WITHOUT LONG-TERM CURRENT USE OF INSULIN (MULTI): Status: RESOLVED | Noted: 2024-01-23 | Resolved: 2024-04-23

## 2024-04-23 PROBLEM — E04.2 NONTOXIC MULTINODULAR GOITER: Status: RESOLVED | Noted: 2023-04-11 | Resolved: 2024-04-23

## 2024-04-23 PROBLEM — I50.43 CHF (CONGESTIVE HEART FAILURE), NYHA CLASS I, ACUTE ON CHRONIC, COMBINED (MULTI): Status: RESOLVED | Noted: 2024-01-27 | Resolved: 2024-04-23

## 2024-04-23 PROCEDURE — 99214 OFFICE O/P EST MOD 30 MIN: CPT | Performed by: FAMILY MEDICINE

## 2024-04-23 PROCEDURE — 1157F ADVNC CARE PLAN IN RCRD: CPT | Performed by: FAMILY MEDICINE

## 2024-04-23 PROCEDURE — 1159F MED LIST DOCD IN RCRD: CPT | Performed by: FAMILY MEDICINE

## 2024-04-23 PROCEDURE — 1170F FXNL STATUS ASSESSED: CPT | Performed by: FAMILY MEDICINE

## 2024-04-23 PROCEDURE — 3074F SYST BP LT 130 MM HG: CPT | Performed by: FAMILY MEDICINE

## 2024-04-23 PROCEDURE — 1123F ACP DISCUSS/DSCN MKR DOCD: CPT | Performed by: FAMILY MEDICINE

## 2024-04-23 PROCEDURE — 3078F DIAST BP <80 MM HG: CPT | Performed by: FAMILY MEDICINE

## 2024-04-23 PROCEDURE — 1160F RVW MEDS BY RX/DR IN RCRD: CPT | Performed by: FAMILY MEDICINE

## 2024-04-23 PROCEDURE — 1036F TOBACCO NON-USER: CPT | Performed by: FAMILY MEDICINE

## 2024-04-23 PROCEDURE — G0439 PPPS, SUBSEQ VISIT: HCPCS | Performed by: FAMILY MEDICINE

## 2024-04-23 RX ORDER — ROSUVASTATIN CALCIUM 5 MG/1
5 TABLET, COATED ORAL DAILY
Qty: 90 TABLET | Refills: 1 | Status: SHIPPED | OUTPATIENT
Start: 2024-04-23

## 2024-04-23 ASSESSMENT — ENCOUNTER SYMPTOMS
APPETITE CHANGE: 0
DYSPHORIC MOOD: 0
DIARRHEA: 0
OCCASIONAL FEELINGS OF UNSTEADINESS: 1
ADENOPATHY: 0
LIGHT-HEADEDNESS: 0
NERVOUS/ANXIOUS: 0
FEVER: 0
CHILLS: 0
BRUISES/BLEEDS EASILY: 0
SINUS PRESSURE: 0
CHEST TIGHTNESS: 0
ABDOMINAL DISTENTION: 0
ARTHRALGIAS: 1
NAUSEA: 0
HEADACHES: 0
SLEEP DISTURBANCE: 0
SHORTNESS OF BREATH: 0
MYALGIAS: 1
LOSS OF SENSATION IN FEET: 0
FATIGUE: 0
DIFFICULTY URINATING: 0
WHEEZING: 0
ABDOMINAL PAIN: 0
DEPRESSION: 0
BACK PAIN: 1
DYSURIA: 0

## 2024-04-23 ASSESSMENT — ACTIVITIES OF DAILY LIVING (ADL)
MANAGING_FINANCES: INDEPENDENT
DRESSING: INDEPENDENT
BATHING: INDEPENDENT
DOING_HOUSEWORK: NEEDS ASSISTANCE
GROCERY_SHOPPING: NEEDS ASSISTANCE
TAKING_MEDICATION: INDEPENDENT

## 2024-04-23 ASSESSMENT — PATIENT HEALTH QUESTIONNAIRE - PHQ9
SUM OF ALL RESPONSES TO PHQ9 QUESTIONS 1 AND 2: 0
1. LITTLE INTEREST OR PLEASURE IN DOING THINGS: NOT AT ALL
2. FEELING DOWN, DEPRESSED OR HOPELESS: NOT AT ALL

## 2024-04-23 NOTE — PROGRESS NOTES
Subjective   Patient ID: Aicha Lord is a 89 y.o. female who presents for Medicare Annual Wellness Visit Subsequent.  PMHX, PSHx, Fam hx, and Social hx reviewed.   New concerns none  Vaccines RSV, Shingles vaccines recommended  Dentist seen at least yearly no  Vision concerns  - monitoring with retinal specialist  Hearing concerns L tinnitus  Diet is  overall healthy.   Smoker - none  Alcohol use - none  Exercising 7 days per week.   Colonoscopy NA  Mamm NA  DEXA is current    Diabetes is well with diet control. A1c 6.4 in December. Doing home exercises. Monitor closely with eye doctor.  Has occasional neuropathy.    Pt has chronic and stable Aortic stenosis, dystolic dysfunction, and HTN.  Pt is taking Lasix, Entresto. Tolerating well.  Exercising 7 days per week   Low sodium diet is usually being followed.   Is not monitoring home blood pressures.  Denies HA, vision changes or CP.     Pt has Dyslipidemia.   Lipid panel showed LDL 89 in December.  Currently taking Rosuvastatin and is tolerating well without muscle pains or weakness.     For arthritic pain, PMR, and FM taking Tylenol as needed.    Pt has chronic OP. Taking calcium and Vit D. Tolerating well. Last DEXA was 2023.  Thyroid nodule unchanged and Dr Tyson has not recommended further imaging.     Pt has CRI, secondary hyperparathyroidism.  It is stable compared to previous labs with GFR 45 in February.              Review of Systems   Constitutional:  Negative for appetite change, chills, fatigue and fever.   HENT:  Positive for hearing loss. Negative for congestion, ear pain and sinus pressure.    Eyes:  Positive for visual disturbance.   Respiratory:  Negative for chest tightness, shortness of breath and wheezing.    Cardiovascular:  Positive for leg swelling. Negative for chest pain.   Gastrointestinal:  Negative for abdominal distention, abdominal pain, diarrhea and nausea.   Genitourinary:  Negative for difficulty urinating, dysuria and pelvic  "pain.   Musculoskeletal:  Positive for arthralgias, back pain and myalgias.   Skin:  Negative for rash.   Allergic/Immunologic: Negative for immunocompromised state.   Neurological:  Negative for light-headedness and headaches.   Hematological:  Negative for adenopathy. Does not bruise/bleed easily.   Psychiatric/Behavioral:  Negative for dysphoric mood and sleep disturbance. The patient is not nervous/anxious.        Objective   /72   Resp 12   Ht 1.499 m (4' 11\")   Wt 64 kg (141 lb)   SpO2 95%   BMI 28.48 kg/m²    Physical Exam  Constitutional:       General: She is not in acute distress.     Appearance: Normal appearance. She is not ill-appearing.   HENT:      Head: Normocephalic and atraumatic.      Right Ear: Tympanic membrane, ear canal and external ear normal.      Left Ear: Tympanic membrane, ear canal and external ear normal.      Nose: Nose normal.      Mouth/Throat:      Mouth: Mucous membranes are moist.      Pharynx: No oropharyngeal exudate or posterior oropharyngeal erythema.   Eyes:      Extraocular Movements: Extraocular movements intact.      Conjunctiva/sclera: Conjunctivae normal.      Pupils: Pupils are equal, round, and reactive to light.   Neck:      Vascular: No carotid bruit.   Cardiovascular:      Rate and Rhythm: Normal rate and regular rhythm.      Heart sounds: Normal heart sounds. No murmur heard.  Pulmonary:      Breath sounds: Normal breath sounds. No wheezing, rhonchi or rales.   Abdominal:      General: Bowel sounds are normal. There is no distension.      Palpations: Abdomen is soft. There is no mass.      Tenderness: There is no abdominal tenderness.   Musculoskeletal:         General: No swelling or deformity.      Cervical back: Neck supple. No tenderness.   Lymphadenopathy:      Cervical: No cervical adenopathy.   Skin:     General: Skin is warm and dry.      Findings: No lesion or rash.   Neurological:      Mental Status: She is alert and oriented to person, place, " and time.      Sensory: No sensory deficit.      Motor: No weakness.      Coordination: Coordination normal.      Deep Tendon Reflexes: Reflexes normal.   Psychiatric:         Mood and Affect: Mood normal.         Behavior: Behavior normal.         Judgment: Judgment normal.     Medicare Wellness Billing Compliance Satisfied    *This is a visual tool to show completion of required items on the day of the visit. Green checks will only appear on the date of visit.    Review all medications by prescribing practitioner or clinical pharmacist (such as prescriptions, OTCs, herbal therapies and supplements) documented in the medical record    Past Medical, Surgical, and Family History reviewed and updated in chart    Tobacco Use Reviewed    Alcohol Use Reviewed    Illicit Drug Use Reviewed    PHQ2/9    Falls in Last Year Reviewed    Home Safety Risk Factors Reviewed    Cognitive Impairment Reviewed    Patient Self Assessment and Health Status    Current Diet Reviewed    Exercise Frequency    ADL - Hearing Impairment    ADL - Bathing    ADL - Dressing    ADL - Walks in Home    IADL - Managing Finances    IADL - Grocery Shopping    IADL - Taking Medications    IADL - Doing Housework        Assessment/Plan   Diagnoses and all orders for this visit:  Healthcare maintenance - RSV and Shingles vaccines recommended at pharmacy. Other vaccines current. Recent labs reviewed and discussed. DEXA is current.  Diabetes - well controlled, will monitor with labs.   Chronic diastolic congestive heart failure/ Aortic valve stenosis, mild- stable, continue to monitor with Dr King  Mixed hyperlipidemia - doing well on Rosuvastatin, monitor with labs  Osteoporosis / Primary hyperparathyroidism - DEXA current. Doing well on calcium and Vit D.  Stage 3b chronic kidney disease - stable, monitor with labs  Polymyalgia rheumatica /Fibromyalgia/ chronic back pain - continue Tylenol as needed.    Follow up in 6months, 30mins

## 2024-04-23 NOTE — PROGRESS NOTES
"Subjective   Reason for Visit: Aicha Lord is an 89 y.o. female here for a Medicare Wellness visit.     Past Medical, Surgical, and Family History reviewed and updated in chart.    Reviewed all medications by prescribing practitioner or clinical pharmacist (such as prescriptions, OTCs, herbal therapies and supplements) and documented in the medical record.    HPI    Patient Care Team:  Reno Alfred MD as PCP - General  Reno Alfred MD as PCP - [employer] PCP  Irma Anderson LPN as Care Manager (Case Management)     Review of Systems    Objective   Vitals:  /72   Resp 12   Ht 1.499 m (4' 11\")   Wt 64 kg (141 lb)   SpO2 95%   BMI 28.48 kg/m²       Physical Exam    Assessment/Plan   Problem List Items Addressed This Visit    None         "

## 2024-04-23 NOTE — PATIENT INSTRUCTIONS

## 2024-04-26 ENCOUNTER — PATIENT OUTREACH (OUTPATIENT)
Dept: CARE COORDINATION | Facility: CLINIC | Age: 89
End: 2024-04-26
Payer: MEDICARE

## 2024-05-11 DIAGNOSIS — I48.11 LONGSTANDING PERSISTENT ATRIAL FIBRILLATION (MULTI): Primary | ICD-10-CM

## 2024-05-13 RX ORDER — RIVAROXABAN 15 MG/1
15 TABLET, FILM COATED ORAL DAILY
Qty: 90 TABLET | Refills: 0 | Status: SHIPPED | OUTPATIENT
Start: 2024-05-13

## 2024-05-28 ENCOUNTER — HOSPITAL ENCOUNTER (EMERGENCY)
Facility: HOSPITAL | Age: 89
Discharge: HOME | End: 2024-05-28
Attending: INTERNAL MEDICINE
Payer: MEDICARE

## 2024-05-28 ENCOUNTER — APPOINTMENT (OUTPATIENT)
Dept: CARDIOLOGY | Facility: HOSPITAL | Age: 89
End: 2024-05-28
Payer: MEDICARE

## 2024-05-28 ENCOUNTER — APPOINTMENT (OUTPATIENT)
Dept: RADIOLOGY | Facility: HOSPITAL | Age: 89
End: 2024-05-28
Payer: MEDICARE

## 2024-05-28 VITALS
HEIGHT: 59 IN | WEIGHT: 140 LBS | SYSTOLIC BLOOD PRESSURE: 128 MMHG | HEART RATE: 63 BPM | BODY MASS INDEX: 28.22 KG/M2 | TEMPERATURE: 97.3 F | RESPIRATION RATE: 20 BRPM | DIASTOLIC BLOOD PRESSURE: 86 MMHG | OXYGEN SATURATION: 97 %

## 2024-05-28 DIAGNOSIS — K57.92 DIVERTICULITIS: Primary | ICD-10-CM

## 2024-05-28 LAB
ALBUMIN SERPL BCP-MCNC: 4 G/DL (ref 3.4–5)
ALP SERPL-CCNC: 73 U/L (ref 33–136)
ALT SERPL W P-5'-P-CCNC: 11 U/L (ref 7–45)
ANION GAP SERPL CALC-SCNC: 14 MMOL/L (ref 10–20)
APPEARANCE UR: CLEAR
AST SERPL W P-5'-P-CCNC: 20 U/L (ref 9–39)
BASOPHILS # BLD AUTO: 0.04 X10*3/UL (ref 0–0.1)
BASOPHILS NFR BLD AUTO: 0.5 %
BILIRUB SERPL-MCNC: 0.5 MG/DL (ref 0–1.2)
BILIRUB UR STRIP.AUTO-MCNC: NEGATIVE MG/DL
BUN SERPL-MCNC: 32 MG/DL (ref 6–23)
CALCIUM SERPL-MCNC: 10.2 MG/DL (ref 8.6–10.3)
CHLORIDE SERPL-SCNC: 104 MMOL/L (ref 98–107)
CO2 SERPL-SCNC: 26 MMOL/L (ref 21–32)
COLOR UR: COLORLESS
CREAT SERPL-MCNC: 1.16 MG/DL (ref 0.5–1.05)
EGFRCR SERPLBLD CKD-EPI 2021: 44 ML/MIN/1.73M*2
EOSINOPHIL # BLD AUTO: 0.04 X10*3/UL (ref 0–0.4)
EOSINOPHIL NFR BLD AUTO: 0.5 %
ERYTHROCYTE [DISTWIDTH] IN BLOOD BY AUTOMATED COUNT: 15.5 % (ref 11.5–14.5)
GLUCOSE SERPL-MCNC: 93 MG/DL (ref 74–99)
GLUCOSE UR STRIP.AUTO-MCNC: NORMAL MG/DL
HCT VFR BLD AUTO: 41.1 % (ref 36–46)
HGB BLD-MCNC: 12.9 G/DL (ref 12–16)
IMM GRANULOCYTES # BLD AUTO: 0.01 X10*3/UL (ref 0–0.5)
IMM GRANULOCYTES NFR BLD AUTO: 0.1 % (ref 0–0.9)
KETONES UR STRIP.AUTO-MCNC: NEGATIVE MG/DL
LEUKOCYTE ESTERASE UR QL STRIP.AUTO: NEGATIVE
LIPASE SERPL-CCNC: 15 U/L (ref 9–82)
LYMPHOCYTES # BLD AUTO: 1.08 X10*3/UL (ref 0.8–3)
LYMPHOCYTES NFR BLD AUTO: 14.3 %
MAGNESIUM SERPL-MCNC: 2.2 MG/DL (ref 1.6–2.4)
MCH RBC QN AUTO: 28 PG (ref 26–34)
MCHC RBC AUTO-ENTMCNC: 31.4 G/DL (ref 32–36)
MCV RBC AUTO: 89 FL (ref 80–100)
MONOCYTES # BLD AUTO: 0.52 X10*3/UL (ref 0.05–0.8)
MONOCYTES NFR BLD AUTO: 6.9 %
NEUTROPHILS # BLD AUTO: 5.84 X10*3/UL (ref 1.6–5.5)
NEUTROPHILS NFR BLD AUTO: 77.7 %
NITRITE UR QL STRIP.AUTO: NEGATIVE
NRBC BLD-RTO: 0 /100 WBCS (ref 0–0)
PH UR STRIP.AUTO: 5.5 [PH]
PLATELET # BLD AUTO: 204 X10*3/UL (ref 150–450)
POTASSIUM SERPL-SCNC: 4.5 MMOL/L (ref 3.5–5.3)
PROT SERPL-MCNC: 6.4 G/DL (ref 6.4–8.2)
PROT UR STRIP.AUTO-MCNC: NEGATIVE MG/DL
RBC # BLD AUTO: 4.6 X10*6/UL (ref 4–5.2)
RBC # UR STRIP.AUTO: NEGATIVE /UL
SODIUM SERPL-SCNC: 139 MMOL/L (ref 136–145)
SP GR UR STRIP.AUTO: 1.01
UROBILINOGEN UR STRIP.AUTO-MCNC: NORMAL MG/DL
WBC # BLD AUTO: 7.5 X10*3/UL (ref 4.4–11.3)

## 2024-05-28 PROCEDURE — 96374 THER/PROPH/DIAG INJ IV PUSH: CPT | Mod: 59

## 2024-05-28 PROCEDURE — 2500000004 HC RX 250 GENERAL PHARMACY W/ HCPCS (ALT 636 FOR OP/ED): Performed by: INTERNAL MEDICINE

## 2024-05-28 PROCEDURE — 93005 ELECTROCARDIOGRAM TRACING: CPT

## 2024-05-28 PROCEDURE — 36415 COLL VENOUS BLD VENIPUNCTURE: CPT | Performed by: STUDENT IN AN ORGANIZED HEALTH CARE EDUCATION/TRAINING PROGRAM

## 2024-05-28 PROCEDURE — 74177 CT ABD & PELVIS W/CONTRAST: CPT | Performed by: RADIOLOGY

## 2024-05-28 PROCEDURE — 85025 COMPLETE CBC W/AUTO DIFF WBC: CPT | Performed by: STUDENT IN AN ORGANIZED HEALTH CARE EDUCATION/TRAINING PROGRAM

## 2024-05-28 PROCEDURE — 81003 URINALYSIS AUTO W/O SCOPE: CPT | Performed by: STUDENT IN AN ORGANIZED HEALTH CARE EDUCATION/TRAINING PROGRAM

## 2024-05-28 PROCEDURE — 83735 ASSAY OF MAGNESIUM: CPT | Performed by: STUDENT IN AN ORGANIZED HEALTH CARE EDUCATION/TRAINING PROGRAM

## 2024-05-28 PROCEDURE — 87040 BLOOD CULTURE FOR BACTERIA: CPT | Mod: 91,AHULAB | Performed by: STUDENT IN AN ORGANIZED HEALTH CARE EDUCATION/TRAINING PROGRAM

## 2024-05-28 PROCEDURE — 83690 ASSAY OF LIPASE: CPT | Performed by: STUDENT IN AN ORGANIZED HEALTH CARE EDUCATION/TRAINING PROGRAM

## 2024-05-28 PROCEDURE — 2550000001 HC RX 255 CONTRASTS: Performed by: INTERNAL MEDICINE

## 2024-05-28 PROCEDURE — 80053 COMPREHEN METABOLIC PANEL: CPT | Performed by: STUDENT IN AN ORGANIZED HEALTH CARE EDUCATION/TRAINING PROGRAM

## 2024-05-28 PROCEDURE — 99285 EMERGENCY DEPT VISIT HI MDM: CPT | Mod: 25

## 2024-05-28 PROCEDURE — 2500000001 HC RX 250 WO HCPCS SELF ADMINISTERED DRUGS (ALT 637 FOR MEDICARE OP): Performed by: INTERNAL MEDICINE

## 2024-05-28 PROCEDURE — 74177 CT ABD & PELVIS W/CONTRAST: CPT

## 2024-05-28 PROCEDURE — 96375 TX/PRO/DX INJ NEW DRUG ADDON: CPT

## 2024-05-28 RX ORDER — METRONIDAZOLE 500 MG/1
500 TABLET ORAL ONCE
Status: COMPLETED | OUTPATIENT
Start: 2024-05-28 | End: 2024-05-28

## 2024-05-28 RX ORDER — ACETAMINOPHEN 325 MG/1
650 TABLET ORAL EVERY 6 HOURS PRN
Qty: 30 TABLET | Refills: 0 | Status: SHIPPED | OUTPATIENT
Start: 2024-05-28

## 2024-05-28 RX ORDER — METRONIDAZOLE 500 MG/1
500 TABLET ORAL 3 TIMES DAILY
Qty: 30 TABLET | Refills: 0 | Status: SHIPPED | OUTPATIENT
Start: 2024-05-28 | End: 2024-06-07

## 2024-05-28 RX ORDER — CEFDINIR 300 MG/1
300 CAPSULE ORAL ONCE
Status: COMPLETED | OUTPATIENT
Start: 2024-05-28 | End: 2024-05-28

## 2024-05-28 RX ORDER — ONDANSETRON HYDROCHLORIDE 2 MG/ML
4 INJECTION, SOLUTION INTRAVENOUS ONCE
Status: COMPLETED | OUTPATIENT
Start: 2024-05-28 | End: 2024-05-28

## 2024-05-28 RX ORDER — CEFDINIR 300 MG/1
300 CAPSULE ORAL 2 TIMES DAILY
Qty: 20 CAPSULE | Refills: 0 | Status: SHIPPED | OUTPATIENT
Start: 2024-05-28 | End: 2024-06-07

## 2024-05-28 RX ADMIN — ONDANSETRON 4 MG: 2 INJECTION INTRAMUSCULAR; INTRAVENOUS at 20:33

## 2024-05-28 RX ADMIN — HYDROMORPHONE HYDROCHLORIDE 0.2 MG: 0.2 INJECTION, SOLUTION INTRAMUSCULAR; INTRAVENOUS; SUBCUTANEOUS at 20:34

## 2024-05-28 RX ADMIN — IOHEXOL 75 ML: 350 INJECTION, SOLUTION INTRAVENOUS at 21:00

## 2024-05-28 RX ADMIN — METRONIDAZOLE 500 MG: 500 TABLET ORAL at 22:59

## 2024-05-28 RX ADMIN — CEFDINIR 300 MG: 300 CAPSULE ORAL at 22:59

## 2024-05-28 ASSESSMENT — PAIN SCALES - GENERAL
PAINLEVEL_OUTOF10: 0 - NO PAIN
PAINLEVEL_OUTOF10: 10 - WORST POSSIBLE PAIN
PAINLEVEL_OUTOF10: 10 - WORST POSSIBLE PAIN

## 2024-05-28 ASSESSMENT — PAIN - FUNCTIONAL ASSESSMENT
PAIN_FUNCTIONAL_ASSESSMENT: 0-10

## 2024-05-28 ASSESSMENT — PAIN DESCRIPTION - LOCATION: LOCATION: ABDOMEN

## 2024-05-28 NOTE — ED TRIAGE NOTES
TRIAGE NOTE   I saw the patient as the Clinician in Triage and performed a brief history and physical exam, established acuity, and ordered appropriate tests to develop basic plan of care. Patient will be seen by an ANNELIESE, resident and/or physician who will independently evaluate the patient. Please see subsequent provider notes for further details and disposition.     Brief HPI: In brief, Aicha Lord is a 89 y.o. female that presents for LLQ/lower abdominal pain. Hx of diverticulitis and aptient states that it feels similar. Also ahving chills and a fever of 101 at home. Reports that she has become septic from diverticulitis before.    Hx of A fib on blood thinners.     Focused Physical exam:   Alert. Non-toxic appearing.  Unlabored respirations  Diffusely tender abdomen, more focal in LLQ.  Irregular rhythm, regular rate.     Plan/MDM:   Ct abdo/pelvis  Labs including cultures given reported fevers at home.     Please see subsequent provider note for further details and disposition

## 2024-05-29 LAB
Q ONSET: 224 MS
QRS COUNT: 9 BEATS
QRS DURATION: 80 MS
QT INTERVAL: 414 MS
QTC CALCULATION(BAZETT): 381 MS
QTC FREDERICIA: 392 MS
R AXIS: -14 DEGREES
T AXIS: 52 DEGREES
T OFFSET: 431 MS
VENTRICULAR RATE: 51 BPM

## 2024-05-29 NOTE — ED NOTES
Patient states LLQ constant abdominal pain that is intermittent in intensity; patient states she has a history of diverticulitis and this feels like the same; abdomen is soft and nondistended but is tender to the touch; pain meds given     Pippa Stephen RN  05/28/24 2043

## 2024-05-29 NOTE — ED PROVIDER NOTES
"HPI     CC: Abdominal Pain     HPI: Aicha Lord is a 89 y.o. female with a history of aortic stenosis, AF on Xarelto, HFpEF, HTN, HLD, diet-controlled DM, osteoporosis, OA, CKD, secondary hyperparathyroidism, prior diverticulitis, presents with abdominal pain.  She states that \"I have diverticulitis again.\"  She states she has had this 3 times before.  She developed pain in her left lower quadrant radiating to the back around 2 AM.  She has mild associated nausea.  Having normal bowel movements.  She endorses subjective fever.  Denies chest pain, shortness of breath, dysuria, hematuria or other symptoms.  She was admitted back in January for diverticulitis with Pseudomonas and E. coli bacteremia.  She was treated with cefepime and Flagyl.  She has multiple antibiotic allergies.    ROS: 10-point review of systems was performed and is otherwise negative except as noted in HPI.    Limitations to history: N/A    Independent Historians: N/A    External Records Reviewed: Prior outpatient notes and DC summary from January 2024    Past Medical History: Noncontributory except per HPI     Past Surgical History: Noncontributory except per HPI     Family History: Reviewed and noncontributory     Social History:  Denies tobacco. Denies ETOH. Denies illicit drugs.    Social Determinants Affecting Care: N/A    Allergies   Allergen Reactions    Celecoxib Unknown    Ciprofloxacin Unknown    Penicillins Unknown    Promethazine Unknown    Statins-Hmg-Coa Reductase Inhibitors Unknown    Sulfamethoxazole-Trimethoprim Unknown    Tramadol Unknown       Home Meds:   Current Outpatient Medications   Medication Instructions    acetaminophen (TYLENOL) 650 mg, oral, Every 6 hours PRN    calcium carbonate-vitamin D3 600 mg-10 mcg (400 unit) capsule 1 tablet, oral, Daily    cefdinir (OMNICEF) 300 mg, oral, 2 times daily    cholecalciferol (Vitamin D-3) 25 MCG (1000 UT) capsule 1 capsule, oral, Daily, TAKE AS DIRECTED.    fluocinolone " (Synalar) 0.01 % external solution 1 Application, Topical, 2 times daily    furosemide (Lasix) 40 mg tablet TAKE 1 TABLET TWICE A DAY BY  MOUTH    ketotifen (Zaditor) 0.025 % (0.035 %) ophthalmic solution ophthalmic (eye), 1 drop in both eyes 2 times a day as needed for itching/allergies    metroNIDAZOLE (FLAGYL) 500 mg, oral, 3 times daily    rosuvastatin (CRESTOR) 5 mg, oral, Daily    sacubitriL-valsartan (Entresto) 24-26 mg tablet 1 tablet, oral, 2 times daily    vit A,C and E-lutein-minerals (Ocuvite with Lutein) 300 mcg-200 mg-27 mg-2 mg tablet 1 tablet, oral, Daily    Xarelto 15 mg, oral, Daily, take with evening meal        Physical Exam     ED Triage Vitals   Temperature Heart Rate Respirations BP   05/28/24 1738 05/28/24 1738 05/28/24 1738 05/28/24 1741   36.3 °C (97.3 °F) 64 16 157/67      Pulse Ox Temp Source Heart Rate Source Patient Position   05/28/24 1738 05/28/24 1738 05/28/24 1738 --   98 % Temporal Monitor       BP Location FiO2 (%)     -- --                     Physical Exam  Vitals and nursing note reviewed.     CONSTITUTIONAL: Well appearing, well nourished, in no acute distress.   HENMT: Head atraumatic. Airway patent. Nasal mucosa clear. Mouth with normal mucosa, clear oropharynx. Uvula midline. Neck supple.    EYES: Clear bilaterally, pupils equally round and reactive to light.   CARDIOVASCULAR: Normal rate, regular rhythm.  Heart sounds S1, S2.  No murmurs, rubs or gallops. Normal pulses. Capillary refill < 2 sec.   RESPIRATORY: No increased work of breathing. Breath sounds clear and equal bilaterally.  GASTROINTESTINAL: Abdomen soft, non-distended, minimal left lower quadrant tenderness. No rebound, no guarding. Normal bowel sounds. No palpable masses.  GENITOURINARY:  No CVA tenderness.  MUSCULOSKELETAL: Spine appears normal, range of motion is not limited, no muscle or joint tenderness. No edema.   NEUROLOGICAL: Alert and oriented, no asymmetry, moving all extremities equally.  SKIN:  Warm, dry and intact. No rash or notable lesions.  PSYCHIATRIC: Normal mood and affect.  HEME/LYMPH: No adenopathy or splenomegaly.    Diagnostic Results      ECG: ECGs read and interpreted by me. See ED Course, below, for interpretation.    Labs Reviewed   CBC WITH AUTO DIFFERENTIAL - Abnormal       Result Value    WBC 7.5      nRBC 0.0      RBC 4.60      Hemoglobin 12.9      Hematocrit 41.1      MCV 89      MCH 28.0      MCHC 31.4 (*)     RDW 15.5 (*)     Platelets 204      Neutrophils % 77.7      Immature Granulocytes %, Automated 0.1      Lymphocytes % 14.3      Monocytes % 6.9      Eosinophils % 0.5      Basophils % 0.5      Neutrophils Absolute 5.84 (*)     Immature Granulocytes Absolute, Automated 0.01      Lymphocytes Absolute 1.08      Monocytes Absolute 0.52      Eosinophils Absolute 0.04      Basophils Absolute 0.04     COMPREHENSIVE METABOLIC PANEL - Abnormal    Glucose 93      Sodium 139      Potassium 4.5      Chloride 104      Bicarbonate 26      Anion Gap 14      Urea Nitrogen 32 (*)     Creatinine 1.16 (*)     eGFR 44 (*)     Calcium 10.2      Albumin 4.0      Alkaline Phosphatase 73      Total Protein 6.4      AST 20      Bilirubin, Total 0.5      ALT 11     URINALYSIS WITH REFLEX CULTURE AND MICROSCOPIC - Abnormal    Color, Urine Colorless (*)     Appearance, Urine Clear      Specific Gravity, Urine 1.010      pH, Urine 5.5      Protein, Urine NEGATIVE      Glucose, Urine Normal      Blood, Urine NEGATIVE      Ketones, Urine NEGATIVE      Bilirubin, Urine NEGATIVE      Urobilinogen, Urine Normal      Nitrite, Urine NEGATIVE      Leukocyte Esterase, Urine NEGATIVE     BLOOD CULTURE - Normal    Blood Culture No growth at 4 days -  FINAL REPORT     BLOOD CULTURE - Normal    Blood Culture No growth at 4 days -  FINAL REPORT     MAGNESIUM - Normal    Magnesium 2.20     LIPASE - Normal    Lipase 15      Narrative:     Venipuncture immediately after or during the administration of Metamizole may lead to  falsely low results. Testing should be performed immediately prior to Metamizole dosing.   URINALYSIS WITH REFLEX CULTURE AND MICROSCOPIC    Narrative:     The following orders were created for panel order Urinalysis with Reflex Culture and Microscopic.  Procedure                               Abnormality         Status                     ---------                               -----------         ------                     Urinalysis with Reflex C...[[id number]]  Abnormal            Final result               Extra Urine Gray Tube[[id number]]                                                         Please view results for these tests on the individual orders.         CT abdomen pelvis w IV contrast   Final Result   1. Features of mild diverticulitis of the mid to distal descending   colon. Correlation with any known colonoscopy results.   2. Senescent changes. Severe vascular calcification        3. Stable incidental findings.        MACRO:   None        Signed by: Santo Durand 5/28/2024 10:10 PM   Dictation workstation:   [id number]                    No data recorded                  Procedure  Procedures    ED Course & MDM   Assessment/Plan:   Aicha Lord is a 89 y.o. female with a history of aortic stenosis, AF on Xarelto, HFpEF, HTN, HLD, diet-controlled DM, osteoporosis, OA, CKD, secondary hyperparathyroidism, prior diverticulitis, presents with left lower quadrant pain similar to prior diverticulitis with some associated nausea.  Patient is concerned about recurrent diverticulitis as am I.  Other possibilities include acute colitis, constipation, UTI. She is hemodynamically stable and generally well-appearing.  Workup was initiated with labs, urinalysis, and CTAP.  Treatment was initiated with a dose of IV Dilaudid and Zofran. See below for details of ED course and ultimate disposition.    Medications   HYDROmorphone PF (Dilaudid) injection 0.2 mg (0.2 mg intravenous Given 5/28/24 2034)    ondansetron (Zofran) injection 4 mg (4 mg intravenous Given 5/28/24 2033)   iohexol (OMNIPaque) 350 mg iodine/mL solution 75 mL (75 mL intravenous Given 5/28/24 2100)   cefdinir (Omnicef) capsule 300 mg (300 mg oral Given 5/28/24 2259)   metroNIDAZOLE (Flagyl) tablet 500 mg (500 mg oral Given 5/28/24 2259)        ED Course as of 06/02/24 0746   Tue May 28, 2024   1939 ECG read interpreted by me.  Atrial fibrillation with slow ventricular response, rate 51.  Normal axis.  Normal intervals.  No ST or T wave derangements. [CG]   2226 Labs are largely reassuring with CBC without leukocytosis, normal H&H, normal platelets, CMP with stably elevated BUN 32 and creatinine 1.16, normal LFTs, negative urinalysis, lipase, and magnesium. [CG]   2227 CTAP shows mild diverticulitis of the mid to distal descending colon and senescent changes, stable incidental findings. [CG]   2227 Patient's pain is gone.  She is stable for discharge.  Patient was started on cefdinir and Flagyl (multiple antibiotic allergies, patient tolerated cefepime/Flagyl during prior admission).  She was given Tylenol for pain.  She has allergies to tramadol so will defer opiates.  Patient was discharged home with anticipatory guidance and strict return precautions. [CG]      ED Course User Index  [CG] Gala Carbajal MD         Diagnoses as of 06/02/24 0746   Diverticulitis       Disposition:   DISCHARGE.  The patient was discharged with both verbal and written anticipatory guidance and strict return precautions. Discharge diagnosis, instructions and plan were discussed and understood. I emphasized the importance of following up with their primary care provider within 24-48 hours and with any referrals in the timeframe recommended. At the time of discharge the patient was comfortable and was in no apparent distress. Patient is aware of diagnostic uncertainty and was notified though testing is negative here, there is a very small chance that pathology  may be missed.  The patient understands these risks and the patient/family understood to call 911 or return immediately to the emergency department if the symptoms worsen or if they have any additional concerns.      FOLLOW UP  Primary care provider in 1-2 days.      ED Prescriptions       Medication Sig Dispense Start Date End Date Auth. Provider    cefdinir (Omnicef) 300 mg capsule Take 1 capsule (300 mg) by mouth 2 times a day for 10 days. 20 capsule 5/28/2024 6/7/2024 Gala Carbajal MD    metroNIDAZOLE (Flagyl) 500 mg tablet Take 1 tablet (500 mg) by mouth 3 times a day for 10 days. 30 tablet 5/28/2024 6/7/2024 Gala Carbajal MD    acetaminophen (Tylenol) 325 mg tablet Take 2 tablets (650 mg) by mouth every 6 hours if needed for mild pain (1 - 3). 30 tablet 5/28/2024 -- MD Gala Porter MD  EM/IM/Peds    This note was dictated by speech recognition. Minor errors in transcription may be present.     Gala Carbajal MD  06/02/24 0746

## 2024-05-29 NOTE — DISCHARGE INSTRUCTIONS
You were seen today for diverticulitis.  You felt better after pain medication.  We gave you a prescription for antibiotics.  Your evaluation was not concerning for an emergency at this time. Please see the attached information sheet for information about your condition, how to care for your condition at home, and reasons to return to the emergency department. Take any prescriptions written today as prescribed. You should call your primary care provider within 24 hours to tell them about today's visit, including any new medications or medication changes, as he or she may want to see you in the office for further evaluation. If you do not have a primary care provider, call  [phone] for an appointment. We offer in-person office visits as well as virtual options. Please do not hesitate to call  [phone] or return to the emergency department with any new or unresolved concerns or symptoms. Thank you for choosing [facility] for your care.

## 2024-06-02 LAB
BACTERIA BLD CULT: NORMAL
BACTERIA BLD CULT: NORMAL

## 2024-06-07 ENCOUNTER — OFFICE VISIT (OUTPATIENT)
Dept: PRIMARY CARE | Facility: CLINIC | Age: 89
End: 2024-06-07
Payer: MEDICARE

## 2024-06-07 VITALS
DIASTOLIC BLOOD PRESSURE: 60 MMHG | WEIGHT: 142 LBS | HEART RATE: 52 BPM | TEMPERATURE: 98.7 F | SYSTOLIC BLOOD PRESSURE: 142 MMHG | OXYGEN SATURATION: 98 % | BODY MASS INDEX: 28.68 KG/M2

## 2024-06-07 DIAGNOSIS — K44.9 HIATAL HERNIA: ICD-10-CM

## 2024-06-07 DIAGNOSIS — K21.9 GASTROESOPHAGEAL REFLUX DISEASE, UNSPECIFIED WHETHER ESOPHAGITIS PRESENT: ICD-10-CM

## 2024-06-07 DIAGNOSIS — K57.92 DIVERTICULITIS: Primary | ICD-10-CM

## 2024-06-07 PROCEDURE — 99213 OFFICE O/P EST LOW 20 MIN: CPT | Performed by: FAMILY MEDICINE

## 2024-06-07 PROCEDURE — 1036F TOBACCO NON-USER: CPT | Performed by: FAMILY MEDICINE

## 2024-06-07 PROCEDURE — 1157F ADVNC CARE PLAN IN RCRD: CPT | Performed by: FAMILY MEDICINE

## 2024-06-07 PROCEDURE — 1123F ACP DISCUSS/DSCN MKR DOCD: CPT | Performed by: FAMILY MEDICINE

## 2024-06-07 PROCEDURE — 3077F SYST BP >= 140 MM HG: CPT | Performed by: FAMILY MEDICINE

## 2024-06-07 PROCEDURE — 1159F MED LIST DOCD IN RCRD: CPT | Performed by: FAMILY MEDICINE

## 2024-06-07 PROCEDURE — 3078F DIAST BP <80 MM HG: CPT | Performed by: FAMILY MEDICINE

## 2024-06-07 RX ORDER — FAMOTIDINE 40 MG/1
40 TABLET, FILM COATED ORAL
Qty: 100 TABLET | Refills: 1 | Status: SHIPPED | OUTPATIENT
Start: 2024-06-07 | End: 2024-12-04

## 2024-06-07 RX ORDER — FAMOTIDINE 40 MG/1
40 TABLET, FILM COATED ORAL 2 TIMES DAILY
Qty: 90 TABLET | Refills: 1 | Status: SHIPPED | OUTPATIENT
Start: 2024-06-07 | End: 2024-06-07 | Stop reason: ALTCHOICE

## 2024-06-07 ASSESSMENT — ENCOUNTER SYMPTOMS
CHEST TIGHTNESS: 0
LIGHT-HEADEDNESS: 0
SINUS PRESSURE: 0
DYSPHORIC MOOD: 0
ARTHRALGIAS: 0
DIFFICULTY URINATING: 0
WHEEZING: 0
SHORTNESS OF BREATH: 0
ABDOMINAL DISTENTION: 0
HEADACHES: 0
CHILLS: 0
DYSURIA: 0
DIARRHEA: 0
ABDOMINAL PAIN: 0
MYALGIAS: 0
ADENOPATHY: 0
NAUSEA: 0
FATIGUE: 0
BRUISES/BLEEDS EASILY: 0
SLEEP DISTURBANCE: 0
APPETITE CHANGE: 0
NERVOUS/ANXIOUS: 0
FEVER: 0

## 2024-06-07 NOTE — PROGRESS NOTES
Subjective   Patient ID: Aicha Lord is a 89 y.o. female who presents for Follow-up (Diverticulitis/05-/).  Here for follow up for ER visit at [facility] 5/28 for L lower abdominal pain. CT showed diverticulitis. Labs were ok. She was tx with Cefdinir and Flagyl  due to hx sepsis from diverticulitis in January ( e.col, pseudomonas). She also has HH and is having occasional heartburn, not on any medication.         Review of Systems   Constitutional:  Negative for appetite change, chills, fatigue and fever.   HENT:  Negative for congestion, ear pain and sinus pressure.    Eyes:  Negative for visual disturbance.   Respiratory:  Negative for chest tightness, shortness of breath and wheezing.    Cardiovascular:  Negative for chest pain.   Gastrointestinal:  Negative for abdominal distention, abdominal pain, diarrhea and nausea.   Genitourinary:  Negative for difficulty urinating, dysuria and pelvic pain.   Musculoskeletal:  Negative for arthralgias and myalgias.   Skin:  Negative for rash.   Allergic/Immunologic: Negative for immunocompromised state.   Neurological:  Negative for light-headedness and headaches.   Hematological:  Negative for adenopathy. Does not bruise/bleed easily.   Psychiatric/Behavioral:  Negative for dysphoric mood and sleep disturbance. The patient is not nervous/anxious.        Objective   /60 (BP Location: Left arm, Patient Position: Sitting, BP Cuff Size: Adult)   Pulse 52   Temp 37.1 °C (98.7 °F) (Temporal)   Wt 64.4 kg (142 lb)   SpO2 98%   BMI 28.68 kg/m²    Physical Exam  Constitutional:       General: She is not in acute distress.     Appearance: Normal appearance.   Cardiovascular:      Rate and Rhythm: Normal rate and regular rhythm.      Heart sounds: Normal heart sounds. No murmur heard.  Pulmonary:      Effort: Pulmonary effort is normal.      Breath sounds: Normal breath sounds.   Abdominal:      Palpations: Abdomen is soft.      Tenderness: There is no abdominal  tenderness.   Neurological:      Mental Status: She is alert.   Psychiatric:         Mood and Affect: Mood normal.         Judgment: Judgment normal.           Assessment/Plan   Diagnoses and all orders for this visit:  Diverticulitis - improving,finish current antibiotics. Call if watery diarrhea does resolve by 1 week. Referral done to Dr Rhodes.  Hiatal hernia/Gastroesophageal reflux disease - start Famotidine before dinner.    Follow up in October as planned

## 2024-06-10 ENCOUNTER — APPOINTMENT (OUTPATIENT)
Dept: OPHTHALMOLOGY | Facility: CLINIC | Age: 89
End: 2024-06-10
Payer: MEDICARE

## 2024-06-10 ENCOUNTER — CLINICAL SUPPORT (OUTPATIENT)
Dept: AUDIOLOGY | Facility: CLINIC | Age: 89
End: 2024-06-10
Payer: MEDICARE

## 2024-06-10 DIAGNOSIS — H90.3 SENSORINEURAL HEARING LOSS (SNHL) OF BOTH EARS: Primary | ICD-10-CM

## 2024-06-10 PROCEDURE — 92567 TYMPANOMETRY: CPT

## 2024-06-10 PROCEDURE — 92557 COMPREHENSIVE HEARING TEST: CPT

## 2024-06-10 ASSESSMENT — PAIN SCALES - GENERAL: PAINLEVEL_OUTOF10: 0 - NO PAIN

## 2024-06-10 ASSESSMENT — PAIN - FUNCTIONAL ASSESSMENT: PAIN_FUNCTIONAL_ASSESSMENT: 0-10

## 2024-06-10 NOTE — PATIENT INSTRUCTIONS
IMPRESSIONS     Mild to severe sensorineural hearing loss in both ears.  Word understanding in quiet is good in both ears.  Tympanometry indicates normal middle ear pressure and reduced eardrum compliance in both ears.     Today's test results are hearing loss requiring medical/otologic and audiologic follow-up.     Amplification needs: Hearing aids were discussed as a management option for hearing loss pending medical clearance. Patient was provided with packet going over steps to obtaining hearing aids and related information.      RECOMMENDATIONS     Continue medical follow up with primary care provider and/or Ears Nose and Throat (ENT) provider as recommended.  Return for audiologic evaluation in conjunction with medical management or annually (whichever is sooner) to monitor hearing sensitivity and assess middle ear status or sooner should concerns arise. The audiology department can be reached at [phone] to schedule an appointment.   Consider amplification pending medical clearance. Check insurance benefit for hearing aid benefits and in-network providers. To schedule a hearing aid consultation with [facility] audiology department, call [phone]. Patient was provided with a copy of today's audiogram.  Avoid exposure to loud sounds by moving away from the noise, turning down the volume, or wearing proper hearing protection correctly.  Consider use of good communication strategies. These include but are not limited to the following: get Aicha's attention before speaking to her, close the distance between Aicha and who is speaking, limit background noise, allow Aicha access to visual cues (i.e. facial expressions/mouth movements, pictures, written instructions, etc.). When in situations where background noise cannot be avoided, position yourself so that the background noise is to your back, and you communication partner is seated in front of you, ideally with a quiet area or wall  behind them.   Consider use of tinnitus management options, which include but are not limited to the following: sound therapy (use of pleasant or calming sounds that diminish the presence of tinnitus); mindfulness, meditation, and breathing exercises; sleep hygiene; mental health evaluation and formal therapies; psychiatric management of psychopharmaceuticals; dental/orthodontia evaluation; dietary changes (reduction of sodium, caffeine, alcohol); lifestyle changes (exercise, etc.); use of hearing protection and avoidance of loud noise; and formal tinnitus therapies (Tinnitus Retraining Therapy/ TRT, Progressive Tinnitus Management, Tinnitus Activities Treatment, Cognitive Behavioral Therapy).

## 2024-06-10 NOTE — PROGRESS NOTES
AUDIOLOGY ADULT AUDIOMETRIC EVALUATION      Name:  Aicha Lord   :  1930  Age:  89 y.o.  Date of Evaluation:  6/10/2024    Time: 9600-0042    IMPRESSIONS     Mild to severe sensorineural hearing loss in both ears.  Word understanding in quiet is good in both ears.  Tympanometry indicates normal middle ear pressure and reduced eardrum compliance in both ears.     Today's test results are hearing loss requiring medical/otologic and audiologic follow-up.     Amplification needs: Hearing aids were discussed as a management option for hearing loss pending medical clearance. Patient was provided with packet going over steps to obtaining hearing aids and related information.      RECOMMENDATIONS     Continue medical follow up with primary care provider and/or Ears Nose and Throat (ENT) provider as recommended.  Return for audiologic evaluation in conjunction with medical management or annually (whichever is sooner) to monitor hearing sensitivity and assess middle ear status or sooner should concerns arise. The audiology department can be reached at [phone] to schedule an appointment.   Consider amplification pending medical clearance. Check insurance benefit for hearing aid benefits and in-network providers. To schedule a hearing aid consultation with [facility] audiology department, call [phone]. Patient was provided with a copy of today's audiogram.  Avoid exposure to loud sounds by moving away from the noise, turning down the volume, or wearing proper hearing protection correctly.  Consider use of good communication strategies. These include but are not limited to the following: get Aicha's attention before speaking to her, close the distance between Aicha and who is speaking, limit background noise, allow Aicha access to visual cues (i.e. facial expressions/mouth movements, pictures, written instructions, etc.). When in situations where background noise cannot be avoided,  position yourself so that the background noise is to your back, and you communication partner is seated in front of you, ideally with a quiet area or wall behind them.   Consider use of tinnitus management options, which include but are not limited to the following: sound therapy (use of pleasant or calming sounds that diminish the presence of tinnitus); mindfulness, meditation, and breathing exercises; sleep hygiene; mental health evaluation and formal therapies; psychiatric management of psychopharmaceuticals; dental/orthodontia evaluation; dietary changes (reduction of sodium, caffeine, alcohol); lifestyle changes (exercise, etc.); use of hearing protection and avoidance of loud noise; and formal tinnitus therapies (Tinnitus Retraining Therapy/ TRT, Progressive Tinnitus Management, Tinnitus Activities Treatment, Cognitive Behavioral Therapy).    HISTORY     Reason for visit:  Ms. Lord is seen today for an initial audiologic evaluation at the request of Reno Alfred MD due to concerns for change in hearing in the left ear greater than the right ear, and long-term left tinnitus. History obtained from patient report and chart review.     Change in Hearing: yes in the left ear greater than the right ear  Difficult listening environments: Noisy situations, mumbling, soft talkers  Tinnitus: yes in the left ear greater than the right ear constant, non-bothersome, non-pulsatile, and described as whistling sensation  Onset left tinnitus 48 years ago following a dive into a swimming pool that resulted in otitis media and otitis externa    Otalgia: denied; psoriasis of both ears  Aural Pressure/Fullness: denied, sometimes her head feels full  Recent Ear Infections/Illness: denied  Otorrhea: denied  Dizziness: yes, described as spinning sensation, lasts for brief periods of time while head is moving, and occurs with head movements and resolves when head movements stop.   History of Ear Surgeries: denied  History of  "Noise Exposure: denied  Hearing Aid Use: None  Other Significant History: denied  Falls within the last year: denied    EVALUATION     See scanned audiogram in \"Media\"     TEST RESULTS     Otoscopic Evaluation:  Right Ear: Non-occluding cerumen with an identifiable cone of light.  Left Ear: Non-occluding cerumen with an identifiable cone of light.    Tympanometry (226 Hz):  Right Ear: Type As, normal middle ear pressure and reduced tympanic membrane compliance.   Left Ear: Type As, normal middle ear pressure and reduced tympanic membrane compliance.     Acoustic Reflexes:   Right Ear: Could not test as hermetic seal could not be maintained.  Left Ear: Could not test as hermetic seal could not be maintained.    Distortion Product Otoacoustic Emissions:  Right Ear: Did not test.  Left Ear:  Did not test.  Present OAEs suggest normal or near cochlear outer hair cell function for corresponding frequency region(s). Absent OAEs with normal middle ear function can be consistent with some degree of hearing loss. Assessment of cochlear outer hair cell function may be impacted by outer or middle ear function.    Test technique:  Pure Tone Audiometry via headphones  Reliability:   good    Pure Tone Audiometry:    Right Ear: Mild sensorineural hearing loss for 125-1000 Hz, sloping to moderate to severe for 7961-7008 Hz.  Left Ear: Mild sensorineural hearing loss for 125-1000 Hz, sloping to moderate to severe for 4566-4011 Hz.    Speech Audiometry:   Right Ear:  Speech Reception Threshold (SRT) was obtained at 35 dB HL. This is in good agreement with three frequency Pure Tone Average. Word Recognition scores were good (80%) in quiet when words were presented at 85 dB HL. These results are based on [facility] Auditory Test No.6 (NU-6) (N=25).   Left Ear:  Speech Reception Threshold (SRT) was obtained at 40 dB HL. This is in good agreement with three frequency Pure Tone Average. Word Recognition scores were good (92%) " in quiet when words were presented at 85 dB HL. These results are based on [facility] Auditory Test No.6 (NU-6) (N=25).     Comparison of today's results with previous test results: No previous results available.         PATIENT EDUCATION     Discussed results and recommendations with Ms. Lord and family. Questions were addressed and the patient was encouraged to contact our department at [phone] should concerns arise.     Shannen Marcos, ANDREY, CCC-A  Licensed Clinical Audiologist    Degree of   Hearing Sensitivity dB Range   Within Normal Limits (WNL) 0 - 20   Slight 25   Mild 26 - 40   Moderate 41 - 55   Moderately-Severe 56 - 70   Severe 71 - 90   Profound 91 +     Key   CHL Conductive Hearing Loss   ECV Ear Canal Volume   HA Hearing Aid   NIHL Noise-Induced Hearing Loss   PTA Pure Tone Average   SNHL Sensorineural Hearing Loss   TM Tympanic Membrane   WNL Within Normal Limits

## 2024-06-25 DIAGNOSIS — L29.9 PRURITIC CONDITION: ICD-10-CM

## 2024-06-25 RX ORDER — FLUOCINOLONE ACETONIDE 0.1 MG/ML
SOLUTION TOPICAL
Qty: 60 ML | Refills: 1 | Status: SHIPPED | OUTPATIENT
Start: 2024-06-25

## 2024-07-02 ENCOUNTER — OFFICE VISIT (OUTPATIENT)
Dept: CARDIOLOGY | Facility: CLINIC | Age: 89
End: 2024-07-02
Payer: MEDICARE

## 2024-07-02 VITALS
DIASTOLIC BLOOD PRESSURE: 65 MMHG | WEIGHT: 142.2 LBS | TEMPERATURE: 98.2 F | BODY MASS INDEX: 28.67 KG/M2 | HEIGHT: 59 IN | HEART RATE: 65 BPM | SYSTOLIC BLOOD PRESSURE: 163 MMHG

## 2024-07-02 DIAGNOSIS — I50.32 CHRONIC DIASTOLIC CONGESTIVE HEART FAILURE (MULTI): Primary | ICD-10-CM

## 2024-07-02 DIAGNOSIS — E78.5 HYPERLIPIDEMIA, UNSPECIFIED HYPERLIPIDEMIA TYPE: ICD-10-CM

## 2024-07-02 DIAGNOSIS — R00.1 BRADYCARDIA: ICD-10-CM

## 2024-07-02 DIAGNOSIS — I48.11 LONGSTANDING PERSISTENT ATRIAL FIBRILLATION (MULTI): ICD-10-CM

## 2024-07-02 DIAGNOSIS — I10 HYPERTENSION, UNSPECIFIED TYPE: ICD-10-CM

## 2024-07-02 PROCEDURE — 3077F SYST BP >= 140 MM HG: CPT | Performed by: NURSE PRACTITIONER

## 2024-07-02 PROCEDURE — 1160F RVW MEDS BY RX/DR IN RCRD: CPT | Performed by: NURSE PRACTITIONER

## 2024-07-02 PROCEDURE — 1036F TOBACCO NON-USER: CPT | Performed by: NURSE PRACTITIONER

## 2024-07-02 PROCEDURE — 1159F MED LIST DOCD IN RCRD: CPT | Performed by: NURSE PRACTITIONER

## 2024-07-02 PROCEDURE — 1123F ACP DISCUSS/DSCN MKR DOCD: CPT | Performed by: NURSE PRACTITIONER

## 2024-07-02 PROCEDURE — 3078F DIAST BP <80 MM HG: CPT | Performed by: NURSE PRACTITIONER

## 2024-07-02 PROCEDURE — 99214 OFFICE O/P EST MOD 30 MIN: CPT | Performed by: NURSE PRACTITIONER

## 2024-07-02 PROCEDURE — 1157F ADVNC CARE PLAN IN RCRD: CPT | Performed by: NURSE PRACTITIONER

## 2024-07-02 NOTE — PROGRESS NOTES
"Chief Complaint:   Chronic Heart Failure     History Of Present Illness:    Aicha Lrod is a 89 y.o. female here for follow-up of heart failure with preserved ejection fraction.  The patient is tolerating guideline-directed medical therapy including entresto and is compliant.  The patient has not been hospitalized for heart failure.  The patient is presently NYHA functional class III and is euvolemic on loop diuretics.    She was hospitalized again with diverticulitis and cdiff. Was dehydrated with FARAZ and entresto was held. Has been taking entresto since discharge.    She reports chronic, unchanged BRYANT. Denies LE edema. Reports fatigue and weakness since discharge.    Weight continues to be stable.     Monitor showed 100% afib burden with longest pause 2.7 seconds. Avg 57bpm.     Echocardiogram 1/5/2024   1. Left ventricular systolic function is normal with a 55-60% estimated ejection fraction.   2. The left atrium is severely dilated.   3. Mildly elevated RVSP.   4. Mild aortic valve stenosis.   5. Mild aortic valve regurgitation.       Allergies:  Celecoxib, Ciprofloxacin, Penicillins, Promethazine, Statins-hmg-coa reductase inhibitors, Sulfamethoxazole-trimethoprim, and Tramadol    Review of Systems  All pertinent systems have been reviewed and are negative except for what is stated in the history of present illness.    All other systems have been reviewed and are negative and noncontributory to this patient's current ailments.     Visit Vitals  /65   Pulse 65   Temp 36.8 °C (98.2 °F)   Ht 1.499 m (4' 11\")   Wt 64.5 kg (142 lb 3.2 oz)   BMI 28.72 kg/m²   OB Status Postmenopausal   Smoking Status Never   BSA 1.64 m²       Objective   Vitals reviewed.   Constitutional:       Appearance: Healthy appearance. Not in distress. Frail.   HENT:      Right Ear: Decreased hearing noted.   Neck:      Vascular: No JVR. JVD normal.   Pulmonary:      Effort: Pulmonary effort is normal.      Breath sounds: Normal " breath sounds. No wheezing. No rhonchi. No rales.   Chest:      Chest wall: Not tender to palpatation.   Cardiovascular:      PMI at left midclavicular line. Bradycardia present. Irregular rhythm. Normal S1. Normal S2.       Murmurs: There is no murmur.      No gallop.  No click. No rub.   Pulses:     Intact distal pulses.   Edema:     Peripheral edema absent.   Abdominal:      General: Bowel sounds are normal.      Palpations: Abdomen is soft.      Tenderness: There is no abdominal tenderness.   Musculoskeletal:         General: No tenderness.      Comments: Walks with walker  Skin:     General: Skin is warm and dry.   Neurological:      General: No focal deficit present.      Mental Status: Alert and oriented to person, place and time.   Psychiatric:         Attention and Perception: Attention normal.         Mood and Affect: Mood normal.       Assessment/Plan   Diagnoses and all orders for this visit:  Chronic diastolic congestive heart failure (CMS/HCC)  - tolerating entresto  - euvolemic on lasix  Longstanding persistent atrial fibrillation (CMS/HCC)  - continue AC  - off all beta blockers due to bradycardia, pause <3 seconds   Hypertension, unspecified type  - stable   Mixed hyperlipidemia  - stable, statin  Bradycardia  - pacemaker not indicated     Offered PT due to patient weakness post hospitalization. She wants to hold off.     Follow up 4 months     Current Outpatient Medications:     acetaminophen (Tylenol) 325 mg tablet, Take 2 tablets (650 mg) by mouth every 6 hours if needed for mild pain (1 - 3)., Disp: 30 tablet, Rfl: 0    calcium carbonate-vitamin D3 600 mg-10 mcg (400 unit) capsule, Take 1 tablet by mouth once daily., Disp: , Rfl:     cholecalciferol (Vitamin D-3) 25 MCG (1000 UT) capsule, Take 1 capsule (25 mcg) by mouth once daily. TAKE AS DIRECTED., Disp: , Rfl:     fluocinolone (Synalar) 0.01 % external solution, apply one application topically twice daily, Disp: 60 mL, Rfl: 1    furosemide  (Lasix) 40 mg tablet, TAKE 1 TABLET TWICE A DAY BY  MOUTH, Disp: 180 tablet, Rfl: 3    ketotifen (Zaditor) 0.025 % (0.035 %) ophthalmic solution, Administer into affected eye(s). 1 drop in both eyes 2 times a day as needed for itching/allergies, Disp: , Rfl:     rosuvastatin (Crestor) 5 mg tablet, Take 1 tablet (5 mg) by mouth once daily., Disp: 90 tablet, Rfl: 1    sacubitriL-valsartan (Entresto) 24-26 mg tablet, Take 1 tablet by mouth 2 times a day., Disp: 180 tablet, Rfl: 2    vit A,C and E-lutein-minerals (Ocuvite with Lutein) 300 mcg-200 mg-27 mg-2 mg tablet, Take 1 tablet by mouth once daily., Disp: , Rfl:     Xarelto 15 mg tablet, TAKE 1 TABLET BY MOUTH DAILY  WITH EVENING MEAL, Disp: 90 tablet, Rfl: 0      Exclusive of any other services or procedures performed, ICaitlyn, spent 30 minutes in duration for this visit today.  This time consisted of chart review, obtaining history, and/or performing the exam as documented above, as well as, documenting the clinical information for the encounter in the electronic record, discussing treatment options, plans, and/or goals with patient, family, and/or caregiver, refilling medications, updating the electronic record, ordering medicines, lab work, imaging, referrals, and/or procedures as documented above and communicating with other [facility] professionals. I have discussed the results of laboratory, radiology, and cardiology studies with the patient and their family/caregiver.

## 2024-07-08 ENCOUNTER — APPOINTMENT (OUTPATIENT)
Dept: OPHTHALMOLOGY | Facility: CLINIC | Age: 89
End: 2024-07-08
Payer: MEDICARE

## 2024-07-08 DIAGNOSIS — H35.30 AGE-RELATED MACULAR DEGENERATION: Primary | ICD-10-CM

## 2024-07-08 DIAGNOSIS — H35.3211 WET AGE-RELATED MACULAR DEGENERATION OF RIGHT EYE WITH ACTIVE CHOROIDAL NEOVASCULARIZATION (MULTI): ICD-10-CM

## 2024-07-08 DIAGNOSIS — H35.3231 EXUDATIVE AGE-RELATED MACULAR DEGENERATION OF BOTH EYES WITH ACTIVE CHOROIDAL NEOVASCULARIZATION (MULTI): ICD-10-CM

## 2024-07-08 PROCEDURE — 92134 CPTRZ OPH DX IMG PST SGM RTA: CPT | Performed by: OPHTHALMOLOGY

## 2024-07-08 PROCEDURE — 99214 OFFICE O/P EST MOD 30 MIN: CPT | Performed by: OPHTHALMOLOGY

## 2024-07-08 ASSESSMENT — CONF VISUAL FIELD
OS_INFERIOR_NASAL_RESTRICTION: 0
OS_INFERIOR_TEMPORAL_RESTRICTION: 0
OD_INFERIOR_NASAL_RESTRICTION: 0
OD_SUPERIOR_NASAL_RESTRICTION: 0
OS_SUPERIOR_TEMPORAL_RESTRICTION: 0
OD_INFERIOR_TEMPORAL_RESTRICTION: 0
OS_NORMAL: 1
OD_SUPERIOR_TEMPORAL_RESTRICTION: 0
OD_NORMAL: 1
OS_SUPERIOR_NASAL_RESTRICTION: 0

## 2024-07-08 ASSESSMENT — VISUAL ACUITY
OD_CC: 20/50
METHOD: SNELLEN - LINEAR
OS_CC: 20/60

## 2024-07-08 ASSESSMENT — TONOMETRY
IOP_METHOD: GOLDMANN APPLANATION
OD_IOP_MMHG: 19
OS_IOP_MMHG: 19

## 2024-07-08 ASSESSMENT — ENCOUNTER SYMPTOMS: EYES NEGATIVE: 1

## 2024-07-08 NOTE — PROGRESS NOTES
Assessment/Plan   Diagnoses and all orders for this visit:  Age-related macular degeneration  -     OCT, Retina - OU - Both Eyes  Wet age-related macular degeneration of right eye with active choroidal neovascularization (Multi)  Exudative age-related macular degeneration of both eyes with active choroidal neovascularization (Multi)  \  today has no subretinal fluid (SRF) CME PED left            DIAGNOSTIC PROCEDURE DONE    OCT DONE OD/OS            REASON FOR TEST: will help address and tailor  therapy by detecting subclinical CME SRF     Hi quality OCT  scans obtained  signal good    OCT OD - Normal Foveal Contour,  Edema, IS/OS [place] Normal  OCT OS - Normal Foveal Contour,?Edema, IS/OS [place] Normal    additional commnents:        Treat as needed    Fu 2m

## 2024-07-13 DIAGNOSIS — I48.11 LONGSTANDING PERSISTENT ATRIAL FIBRILLATION (MULTI): ICD-10-CM

## 2024-07-15 ENCOUNTER — PATIENT OUTREACH (OUTPATIENT)
Dept: CARE COORDINATION | Facility: CLINIC | Age: 89
End: 2024-07-15
Payer: MEDICARE

## 2024-07-15 NOTE — PROGRESS NOTES
Discharge Facility [facility]  Discharge Diagnosis:Diverticulitis  Admission Date:07/12/24  Discharge Date: 07/14/24    PCP Appointment Date:none made sent to pcp office  Specialist Appointment Date:   Hospital Encounter and Summary Linked: Yes  See discharge assessment below for further details  Two attempts were made to reach patient within two business days after discharge. Voicemail left with contact information for patient to call back with any non-emergent questions or concerns.;

## 2024-07-25 DIAGNOSIS — I50.32 CHRONIC DIASTOLIC CONGESTIVE HEART FAILURE (MULTI): ICD-10-CM

## 2024-07-26 ENCOUNTER — PATIENT OUTREACH (OUTPATIENT)
Dept: CARE COORDINATION | Facility: CLINIC | Age: 89
End: 2024-07-26
Payer: MEDICARE

## 2024-08-05 DIAGNOSIS — R29.6 FREQUENT FALLS: Primary | ICD-10-CM

## 2024-08-05 DIAGNOSIS — R53.1 WEAKNESS: ICD-10-CM

## 2024-08-05 DIAGNOSIS — R26.89 BALANCE PROBLEM: ICD-10-CM

## 2024-08-07 ENCOUNTER — HOME HEALTH ADMISSION (OUTPATIENT)
Dept: HOME HEALTH SERVICES | Facility: HOME HEALTH | Age: 89
End: 2024-08-07
Payer: MEDICARE

## 2024-08-07 ENCOUNTER — DOCUMENTATION (OUTPATIENT)
Dept: HOME HEALTH SERVICES | Facility: HOME HEALTH | Age: 89
End: 2024-08-07
Payer: MEDICARE

## 2024-08-07 DIAGNOSIS — R53.1 WEAKNESS: Primary | ICD-10-CM

## 2024-08-07 NOTE — HH CARE COORDINATION
Home Care received a Referral for Physical Therapy. We have processed the referral for a Start of Care on 8/9/24.     If you have any questions or concerns, please feel free to contact us at [phone]. Follow the prompts, enter your five digit zip code, and you will be directed to your care team on C[facility] 2.

## 2024-08-09 ENCOUNTER — HOME CARE VISIT (OUTPATIENT)
Dept: HOME HEALTH SERVICES | Facility: HOME HEALTH | Age: 89
End: 2024-08-09
Payer: MEDICARE

## 2024-08-09 VITALS — HEART RATE: 55 BPM | OXYGEN SATURATION: 97 % | TEMPERATURE: 97.6 F

## 2024-08-09 PROCEDURE — G0151 HHCP-SERV OF PT,EA 15 MIN: HCPCS

## 2024-08-09 ASSESSMENT — ENCOUNTER SYMPTOMS
LOSS OF SENSATION IN FEET: 0
OCCASIONAL FEELINGS OF UNSTEADINESS: 0
SUBJECTIVE PAIN PROGRESSION: UNCHANGED
LOWEST PAIN SEVERITY IN PAST 24 HOURS: 3/10
HYPERTENSION: 1
HIGHEST PAIN SEVERITY IN PAST 24 HOURS: 3/10
DEPRESSION: 0
MUSCLE WEAKNESS: 1
PAIN LOCATION - RELIEVING FACTORS: OTC MEDS
PAIN: 1
PERSON REPORTING PAIN: PATIENT
PAIN LOCATION: GENERALIZED
PAIN LOCATION - PAIN FREQUENCY: CONSTANT

## 2024-08-09 ASSESSMENT — GAIT ASSESSMENTS
TRUNK SCORE: 0
INITIATION OF GAIT IMMEDIATELY AFTER GO: 1 - NO HESITANCY
BALANCE AND GAIT SCORE: 16
STEP SYMMETRY: 1 - RIGHT AND LEFT STEP LENGTH APPEAR EQUAL
PATH: 1 - MILD/MODERATE DEVIATION OR USES WALKING AID
PATH SCORE: 1
TRUNK: 0 - MARKED SWAY OR USES WALKING AID
STEP CONTINUITY: 1 - STEPS APPEAR CONTINUOUS
WALKING STANCE: 1 - HEELS ALMOST TOUCHING WHILE WALKING
GAIT SCORE: 9

## 2024-08-09 ASSESSMENT — BALANCE ASSESSMENTS
SITTING DOWN: 1 - USES ARMS OR NOT SMOOTH MOTION
IMMEDIATE STANDING BALANCE FIRST 5 SECONDS: 1 - STEADY BUT USES WALKER OR OTHER SUPPORT
BALANCE SCORE: 7
NUDGED: 0 - BEGINS TO FALL
EYES CLOSED AT MAXIMUM POSITION NUDGED: 0 - UNSTEADY
ARISES: 1 - ABLE, USES ARMS TO HELP
STANDING BALANCE: 1 - STEADY BUT WIDE STANCE AND USES CANE OR OTHER SUPPORT
SITTING BALANCE: 1 - STEADY, SAFE
ATTEMPTS TO ARISE: 2 - ABLE TO RISE, ONE ATTEMPT
TURNING 360 DEGREES STEPS: 0 - DISCONTINUOUS STEPS
NUDGED SCORE: 0
ARISING SCORE: 1

## 2024-08-09 ASSESSMENT — PAIN SCALES - PAIN ASSESSMENT IN ADVANCED DEMENTIA (PAINAD)
BREATHING: 0
BODYLANGUAGE: 0 - RELAXED.
NEGVOCALIZATION: 0
CONSOLABILITY: 0 - NO NEED TO CONSOLE.
CONSOLABILITY: 0
TOTALSCORE: 0
NEGVOCALIZATION: 0 - NONE.
FACIALEXPRESSION: 0
BODYLANGUAGE: 0
FACIALEXPRESSION: 0 - SMILING OR INEXPRESSIVE.

## 2024-08-09 ASSESSMENT — ACTIVITIES OF DAILY LIVING (ADL)
AMBULATION ASSISTANCE: STAND BY ASSIST
ENTERING_EXITING_HOME: NEEDS ASSISTANCE
CURRENT_FUNCTION: STAND BY ASSIST
AMBULATION ASSISTANCE: 1
AMBULATION ASSISTANCE ON FLAT SURFACES: 1
PHYSICAL TRANSFERS ASSESSED: 1
OASIS_M1830: 03

## 2024-08-12 ENCOUNTER — HOME CARE VISIT (OUTPATIENT)
Dept: HOME HEALTH SERVICES | Facility: HOME HEALTH | Age: 89
End: 2024-08-12
Payer: MEDICARE

## 2024-08-12 ENCOUNTER — PATIENT OUTREACH (OUTPATIENT)
Dept: CARE COORDINATION | Facility: CLINIC | Age: 89
End: 2024-08-12
Payer: MEDICARE

## 2024-08-12 PROCEDURE — G0157 HHC PT ASSISTANT EA 15: HCPCS | Mod: CQ

## 2024-08-12 SDOH — HEALTH STABILITY: PHYSICAL HEALTH: PHYSICAL EXERCISE: 10

## 2024-08-12 SDOH — HEALTH STABILITY: PHYSICAL HEALTH: PHYSICAL EXERCISE: SEATED

## 2024-08-12 SDOH — HEALTH STABILITY: PHYSICAL HEALTH: EXERCISE ACTIVITY: B LES

## 2024-08-12 SDOH — HEALTH STABILITY: PHYSICAL HEALTH: PHYSICAL EXERCISE: STANDING

## 2024-08-12 SDOH — HEALTH STABILITY: PHYSICAL HEALTH: PHYSICAL EXERCISE: SUPINE

## 2024-08-12 SDOH — HEALTH STABILITY: PHYSICAL HEALTH: EXERCISE ACTIVITIES SETS: 1

## 2024-08-12 SDOH — HEALTH STABILITY: PHYSICAL HEALTH: EXERCISE TYPE: STRENGTHENING B LES

## 2024-08-12 ASSESSMENT — ACTIVITIES OF DAILY LIVING (ADL)
AMBULATION ASSISTANCE ON FLAT SURFACES: 1
AMBULATION_DISTANCE/DURATION_TOLERATED: 30 FT X 2

## 2024-08-14 ENCOUNTER — HOME CARE VISIT (OUTPATIENT)
Dept: HOME HEALTH SERVICES | Facility: HOME HEALTH | Age: 89
End: 2024-08-14
Payer: MEDICARE

## 2024-08-14 PROCEDURE — G0157 HHC PT ASSISTANT EA 15: HCPCS | Mod: CQ

## 2024-08-14 SDOH — HEALTH STABILITY: PHYSICAL HEALTH: EXERCISE ACTIVITIES SETS: 1

## 2024-08-14 SDOH — HEALTH STABILITY: PHYSICAL HEALTH: EXERCISE ACTIVITY: B LES

## 2024-08-14 SDOH — HEALTH STABILITY: PHYSICAL HEALTH: PHYSICAL EXERCISE: SEATED

## 2024-08-14 SDOH — HEALTH STABILITY: PHYSICAL HEALTH: EXERCISE TYPE: STRENGTHENING B LES

## 2024-08-14 SDOH — HEALTH STABILITY: PHYSICAL HEALTH: PHYSICAL EXERCISE: 10

## 2024-08-14 SDOH — HEALTH STABILITY: PHYSICAL HEALTH: PHYSICAL EXERCISE: SUPINE

## 2024-08-14 SDOH — HEALTH STABILITY: PHYSICAL HEALTH: PHYSICAL EXERCISE: STANDING

## 2024-08-14 ASSESSMENT — ACTIVITIES OF DAILY LIVING (ADL)
AMBULATION_DISTANCE/DURATION_TOLERATED: 50 FT X 2
AMBULATION ASSISTANCE ON FLAT SURFACES: 1

## 2024-08-20 ENCOUNTER — HOME CARE VISIT (OUTPATIENT)
Dept: HOME HEALTH SERVICES | Facility: HOME HEALTH | Age: 89
End: 2024-08-20
Payer: MEDICARE

## 2024-08-20 PROCEDURE — G0157 HHC PT ASSISTANT EA 15: HCPCS | Mod: CQ

## 2024-08-20 SDOH — HEALTH STABILITY: PHYSICAL HEALTH: PHYSICAL EXERCISE: STANDING

## 2024-08-20 SDOH — HEALTH STABILITY: PHYSICAL HEALTH: EXERCISE TYPE: STRENGTHENING B LES

## 2024-08-20 SDOH — HEALTH STABILITY: PHYSICAL HEALTH: EXERCISE ACTIVITIES SETS: 2

## 2024-08-20 SDOH — HEALTH STABILITY: PHYSICAL HEALTH: EXERCISE ACTIVITIES SETS: 1

## 2024-08-20 SDOH — HEALTH STABILITY: PHYSICAL HEALTH: PHYSICAL EXERCISE: 10

## 2024-08-20 SDOH — HEALTH STABILITY: PHYSICAL HEALTH: EXERCISE ACTIVITY: B LES

## 2024-08-20 SDOH — HEALTH STABILITY: PHYSICAL HEALTH: PHYSICAL EXERCISE: SUPINE

## 2024-08-20 SDOH — HEALTH STABILITY: PHYSICAL HEALTH: PHYSICAL EXERCISE: SEATED

## 2024-08-20 ASSESSMENT — ACTIVITIES OF DAILY LIVING (ADL)
AMBULATION_DISTANCE/DURATION_TOLERATED: 75 FT X2
AMBULATION ASSISTANCE ON FLAT SURFACES: 1

## 2024-08-22 ENCOUNTER — HOME CARE VISIT (OUTPATIENT)
Dept: HOME HEALTH SERVICES | Facility: HOME HEALTH | Age: 89
End: 2024-08-22
Payer: MEDICARE

## 2024-08-22 PROCEDURE — G0157 HHC PT ASSISTANT EA 15: HCPCS | Mod: CQ

## 2024-08-22 SDOH — HEALTH STABILITY: PHYSICAL HEALTH: EXERCISE ACTIVITY: B LES

## 2024-08-22 SDOH — HEALTH STABILITY: PHYSICAL HEALTH: EXERCISE TYPE: STRENGTHENING B LES

## 2024-08-22 SDOH — HEALTH STABILITY: PHYSICAL HEALTH: PHYSICAL EXERCISE: STANDING

## 2024-08-22 SDOH — HEALTH STABILITY: PHYSICAL HEALTH: EXERCISE ACTIVITIES SETS: 1

## 2024-08-22 SDOH — HEALTH STABILITY: PHYSICAL HEALTH: PHYSICAL EXERCISE: 15

## 2024-08-22 SDOH — HEALTH STABILITY: PHYSICAL HEALTH: PHYSICAL EXERCISE: SEATED

## 2024-08-22 SDOH — HEALTH STABILITY: PHYSICAL HEALTH: PHYSICAL EXERCISE: SUPINE

## 2024-08-22 ASSESSMENT — ACTIVITIES OF DAILY LIVING (ADL)
AMBULATION_DISTANCE/DURATION_TOLERATED: 75 FT X 2
AMBULATION ASSISTANCE ON FLAT SURFACES: 1

## 2024-08-27 ENCOUNTER — HOME CARE VISIT (OUTPATIENT)
Dept: HOME HEALTH SERVICES | Facility: HOME HEALTH | Age: 89
End: 2024-08-27
Payer: MEDICARE

## 2024-08-27 PROCEDURE — G0157 HHC PT ASSISTANT EA 15: HCPCS | Mod: CQ

## 2024-08-27 SDOH — HEALTH STABILITY: PHYSICAL HEALTH: PHYSICAL EXERCISE: STANDING

## 2024-08-27 SDOH — HEALTH STABILITY: PHYSICAL HEALTH: PHYSICAL EXERCISE: SEATED

## 2024-08-27 SDOH — HEALTH STABILITY: PHYSICAL HEALTH: EXERCISE ACTIVITY: B LES

## 2024-08-27 SDOH — HEALTH STABILITY: PHYSICAL HEALTH: EXERCISE TYPE: STRENGTHENING B LES

## 2024-08-27 SDOH — HEALTH STABILITY: PHYSICAL HEALTH: PHYSICAL EXERCISE: SUPINE

## 2024-08-27 SDOH — HEALTH STABILITY: PHYSICAL HEALTH: PHYSICAL EXERCISE: 15

## 2024-08-27 SDOH — HEALTH STABILITY: PHYSICAL HEALTH: EXERCISE ACTIVITIES SETS: 1

## 2024-08-27 ASSESSMENT — ACTIVITIES OF DAILY LIVING (ADL)
AMBULATION_DISTANCE/DURATION_TOLERATED: 75 FT X 2
AMBULATION ASSISTANCE ON FLAT SURFACES: 1

## 2024-08-29 ENCOUNTER — HOME CARE VISIT (OUTPATIENT)
Dept: HOME HEALTH SERVICES | Facility: HOME HEALTH | Age: 89
End: 2024-08-29
Payer: MEDICARE

## 2024-08-29 PROCEDURE — G0157 HHC PT ASSISTANT EA 15: HCPCS | Mod: CQ

## 2024-08-29 SDOH — HEALTH STABILITY: PHYSICAL HEALTH: EXERCISE ACTIVITIES SETS: 2

## 2024-08-29 SDOH — HEALTH STABILITY: PHYSICAL HEALTH: EXERCISE ACTIVITY: B LES

## 2024-08-29 SDOH — HEALTH STABILITY: PHYSICAL HEALTH: PHYSICAL EXERCISE: 10

## 2024-08-29 SDOH — HEALTH STABILITY: PHYSICAL HEALTH: PHYSICAL EXERCISE: SEATED

## 2024-08-29 SDOH — HEALTH STABILITY: PHYSICAL HEALTH: PHYSICAL EXERCISE: STANDING

## 2024-08-29 SDOH — HEALTH STABILITY: PHYSICAL HEALTH: PHYSICAL EXERCISE: SUPINE

## 2024-08-29 SDOH — HEALTH STABILITY: PHYSICAL HEALTH: EXERCISE TYPE: STRENGTHENING B LES

## 2024-08-29 ASSESSMENT — ACTIVITIES OF DAILY LIVING (ADL)
AMBULATION ASSISTANCE ON FLAT SURFACES: 1
AMBULATION_DISTANCE/DURATION_TOLERATED: 100 FT X 2

## 2024-09-03 ENCOUNTER — HOME CARE VISIT (OUTPATIENT)
Dept: HOME HEALTH SERVICES | Facility: HOME HEALTH | Age: 89
End: 2024-09-03
Payer: MEDICARE

## 2024-09-03 PROCEDURE — G0151 HHCP-SERV OF PT,EA 15 MIN: HCPCS

## 2024-09-03 SDOH — HEALTH STABILITY: PHYSICAL HEALTH: EXERCISE TYPE: LE STRENGTHENING

## 2024-09-03 ASSESSMENT — GAIT ASSESSMENTS
GAIT SCORE: 9
STEP CONTINUITY: 1 - STEPS APPEAR CONTINUOUS
PATH SCORE: 1
INITIATION OF GAIT IMMEDIATELY AFTER GO: 1 - NO HESITANCY
BALANCE AND GAIT SCORE: 16
WALKING STANCE: 1 - HEELS ALMOST TOUCHING WHILE WALKING
STEP SYMMETRY: 1 - RIGHT AND LEFT STEP LENGTH APPEAR EQUAL
TRUNK SCORE: 0
TRUNK: 0 - MARKED SWAY OR USES WALKING AID
PATH: 1 - MILD/MODERATE DEVIATION OR USES WALKING AID

## 2024-09-03 ASSESSMENT — PAIN SCALES - PAIN ASSESSMENT IN ADVANCED DEMENTIA (PAINAD)
NEGVOCALIZATION: 0
CONSOLABILITY: 0 - NO NEED TO CONSOLE.
FACIALEXPRESSION: 0
BODYLANGUAGE: 0 - RELAXED.
TOTALSCORE: 0
BODYLANGUAGE: 0
NEGVOCALIZATION: 0 - NONE.
BREATHING: 0
FACIALEXPRESSION: 0 - SMILING OR INEXPRESSIVE.
CONSOLABILITY: 0

## 2024-09-03 ASSESSMENT — ACTIVITIES OF DAILY LIVING (ADL)
HOME_HEALTH_OASIS: 01
AMBULATION ASSISTANCE: 1
PHYSICAL TRANSFERS ASSESSED: 1
AMBULATION ASSISTANCE ON FLAT SURFACES: 1
OASIS_M1830: 03
AMBULATION_DISTANCE/DURATION_TOLERATED: HOUSEHOLD DISTANCES

## 2024-09-03 ASSESSMENT — ENCOUNTER SYMPTOMS
PERSON REPORTING PAIN: PATIENT
LOSS OF SENSATION IN FEET: 0
DENIES PAIN: 1
DEPRESSION: 0
OCCASIONAL FEELINGS OF UNSTEADINESS: 0

## 2024-09-03 ASSESSMENT — BALANCE ASSESSMENTS
SITTING BALANCE: 1 - STEADY, SAFE
BALANCE SCORE: 7
TURNING 360 DEGREES STEPS: 0 - DISCONTINUOUS STEPS
EYES CLOSED AT MAXIMUM POSITION NUDGED: 0 - UNSTEADY
NUDGED: 0 - BEGINS TO FALL
STANDING BALANCE: 1 - STEADY BUT WIDE STANCE AND USES CANE OR OTHER SUPPORT
NUDGED SCORE: 0
ARISING SCORE: 1
ARISES: 1 - ABLE, USES ARMS TO HELP
IMMEDIATE STANDING BALANCE FIRST 5 SECONDS: 1 - STEADY BUT USES WALKER OR OTHER SUPPORT
SITTING DOWN: 1 - USES ARMS OR NOT SMOOTH MOTION
ATTEMPTS TO ARISE: 2 - ABLE TO RISE, ONE ATTEMPT

## 2024-09-06 NOTE — CASE COMMUNICATION
Transfer of care.  Patient returning to [place] on Saturday.     Patient has excelled in physical therapy. I did a reassessment today that will take her through the end of the cert.  I fuly expert her to be ready for discharge at that point. I have been working with her on high level balance activities without an AD as well as a full flight of stairs up and down without breaks. Her gait remains significantly better with a SPC vs witho ut particularly her step length.  I told her she must use her SPC until her gait quality improves without it.      Regards,  Mike
hide

## 2024-09-09 ENCOUNTER — APPOINTMENT (OUTPATIENT)
Dept: OPHTHALMOLOGY | Facility: CLINIC | Age: 89
End: 2024-09-09
Payer: MEDICARE

## 2024-09-09 DIAGNOSIS — H35.3211 WET AGE-RELATED MACULAR DEGENERATION OF RIGHT EYE WITH ACTIVE CHOROIDAL NEOVASCULARIZATION (MULTI): Primary | ICD-10-CM

## 2024-09-09 PROCEDURE — 92134 CPTRZ OPH DX IMG PST SGM RTA: CPT | Performed by: OPHTHALMOLOGY

## 2024-09-09 PROCEDURE — 67028 INJECTION EYE DRUG: CPT | Mod: RIGHT SIDE | Performed by: OPHTHALMOLOGY

## 2024-09-09 ASSESSMENT — CONF VISUAL FIELD
OD_INFERIOR_TEMPORAL_RESTRICTION: 0
OS_INFERIOR_TEMPORAL_RESTRICTION: 0
OD_NORMAL: 1
OD_SUPERIOR_TEMPORAL_RESTRICTION: 0
OS_SUPERIOR_NASAL_RESTRICTION: 0
OS_NORMAL: 1
OD_INFERIOR_NASAL_RESTRICTION: 0
OS_SUPERIOR_TEMPORAL_RESTRICTION: 0
OD_SUPERIOR_NASAL_RESTRICTION: 0
OS_INFERIOR_NASAL_RESTRICTION: 0

## 2024-09-09 ASSESSMENT — TONOMETRY
OD_IOP_MMHG: 18
IOP_METHOD: GOLDMANN APPLANATION
OS_IOP_MMHG: 18

## 2024-09-09 ASSESSMENT — VISUAL ACUITY
OS_CC: 20/60
OD_CC: 20/70
METHOD: SNELLEN - LINEAR

## 2024-09-09 ASSESSMENT — ENCOUNTER SYMPTOMS: EYES NEGATIVE: 1

## 2024-09-09 ASSESSMENT — SLIT LAMP EXAM - LIDS
COMMENTS: NORMAL
COMMENTS: NORMAL

## 2024-09-09 ASSESSMENT — EXTERNAL EXAM - RIGHT EYE: OD_EXAM: NORMAL

## 2024-09-09 ASSESSMENT — EXTERNAL EXAM - LEFT EYE: OS_EXAM: NORMAL

## 2024-09-09 NOTE — PROGRESS NOTES
Assessment/Plan   Diagnoses and all orders for this visit:  Wet age-related macular degeneration of right eye with active choroidal neovascularization (Multi)  -     OCT, Retina - OU - Both Eyes  -     Intravitreal Injection, Pharmacologic Agent - OD - Right Eye  \  today has subretinal fluid (SRF) CME PED left            DIAGNOSTIC PROCEDURE DONE    OCT DONE OD/OS            REASON FOR TEST: will help address and tailor  therapy by detecting subclinical CME SRF     Hi quality OCT  scans obtained  signal good    OCT OD - Normal Foveal Contour,  Edema, IS/OS [place] Normal  OCT OS - Normal Foveal Contour,?Edema, IS/OS [place] Normal    additional commnents:     Treat both eyes as it difficult for her to come   Treatment options for CNV OD  discussed, including observation, anti-VEGF injections (including Avastin, Lucentis,   Eylea  Vabysmo and  Beovu ), and  laser. Recommend anti-VEGF injections. Eylea done OD today in a sterile manner with Betadine 5% for antisepsis.

## 2024-10-24 ENCOUNTER — APPOINTMENT (OUTPATIENT)
Dept: PRIMARY CARE | Facility: CLINIC | Age: 89
End: 2024-10-24

## 2024-11-01 ENCOUNTER — HOME HEALTH ADMISSION (OUTPATIENT)
Dept: HOME HEALTH SERVICES | Facility: HOME HEALTH | Age: 89
End: 2024-11-01
Payer: MEDICARE

## 2024-11-03 ENCOUNTER — DOCUMENTATION (OUTPATIENT)
Dept: HOME HEALTH SERVICES | Facility: HOME HEALTH | Age: 89
End: 2024-11-03
Payer: MEDICARE

## 2024-11-05 ENCOUNTER — PATIENT OUTREACH (OUTPATIENT)
Dept: PRIMARY CARE | Facility: CLINIC | Age: 89
End: 2024-11-05
Payer: MEDICARE

## 2024-11-05 RX ORDER — PANTOPRAZOLE SODIUM 40 MG/1
40 TABLET, DELAYED RELEASE ORAL 2 TIMES DAILY
COMMUNITY
Start: 2024-11-03 | End: 2024-11-17

## 2024-11-05 NOTE — PROGRESS NOTES
Discharge Facility: [facility]  Discharge Diagnosis: GI bleed  Admission Date: 10/30/24  Discharge Date: 11/3/24    PCP Appointment Date: TBD no answer  Specialist Appointment Date: FU with cardiology about xarelto on 11/5  TBD GI  Hospital Encounter and Summary Linked: Yes  Two attempts were made to reach patient within two business days after discharge. Voicemail left with contact information for patient to call back with any non-emergent questions or concerns.

## 2024-11-06 ENCOUNTER — HOME CARE VISIT (OUTPATIENT)
Dept: HOME HEALTH SERVICES | Facility: HOME HEALTH | Age: 89
End: 2024-11-06
Payer: MEDICARE

## 2024-11-06 ENCOUNTER — TELEPHONE (OUTPATIENT)
Dept: CARDIOLOGY | Facility: CLINIC | Age: 89
End: 2024-11-06
Payer: MEDICARE

## 2024-11-06 VITALS
OXYGEN SATURATION: 100 % | DIASTOLIC BLOOD PRESSURE: 58 MMHG | TEMPERATURE: 97.3 F | HEART RATE: 56 BPM | SYSTOLIC BLOOD PRESSURE: 102 MMHG

## 2024-11-06 PROCEDURE — G0299 HHS/HOSPICE OF RN EA 15 MIN: HCPCS

## 2024-11-06 NOTE — TELEPHONE ENCOUNTER
Emily (Grandchild) to [facility]      11/6/24  8:58 AM  pts granddaughter Emily martell stating she messed up and missed pts appt yesterday. she does not feel comfortable waiting until next wednesday for hospital FUV as she was admitted for GI bleed and is currently off her Xarelto.  Me to Emily (Grandchild)         11/6/24  9:05 AM  per Caitlyn pt will need full 40 minute slot for follow up pt, pt should also follow up with GI to get their opinion and clearance  Me       11/6/24  9:06 AM  Note      Spoke with patient's granddaughter Emily who will get pt in to see GI and reviewed Caitlyn's recommendation. Verbalized understanding.         Me to Liz Corey         11/6/24  9:07 AM  Please add pt to schedule on 11/13 at 2pm. Thank you!      Liz Corey to [address] [facility] Clinical Support Staff       11/6/24 10:47 AM  Pt scheduled.  This message needs to all go to MRN# [id number] Aicha Lord.

## 2024-11-07 ENCOUNTER — HOME CARE VISIT (OUTPATIENT)
Dept: HOME HEALTH SERVICES | Facility: HOME HEALTH | Age: 89
End: 2024-11-07
Payer: MEDICARE

## 2024-11-07 PROCEDURE — G0152 HHCP-SERV OF OT,EA 15 MIN: HCPCS

## 2024-11-07 ASSESSMENT — ACTIVITIES OF DAILY LIVING (ADL)
FEEDING ASSESSED: 1
LAUNDRY ASSESSED: 1
BATHING ASSESSED: 1
FEEDING: INDEPENDENT
DRESSING_LB_CURRENT_FUNCTION: INDEPENDENT
GROOMING ASSESSED: 1
TOILETING: 1
AMBULATION ASSISTANCE: MINIMUM ASSIST
OASIS_M1830: 04
BATHING EQUIPMENT USED: SHOWER CHAIR
ENTERING_EXITING_HOME: MODERATE ASSIST
BATHING_CURRENT_FUNCTION: INDEPENDENT
GROOMING_CURRENT_FUNCTION: INDEPENDENT
AMBULATION ASSISTANCE: 1
TOILETING: INDEPENDENT
LAUNDRY: NEEDS ASSISTANCE
DRESSING_UB_CURRENT_FUNCTION: INDEPENDENT

## 2024-11-07 ASSESSMENT — ENCOUNTER SYMPTOMS
CONSTIPATION: 1
DYSPNEA ACTIVITY LEVEL: AFTER AMBULATING MORE THAN 20 FT
APPETITE LEVEL: FAIR
FATIGUES EASILY: 1
PERSON REPORTING PAIN: PATIENT
SHORTNESS OF BREATH: 1
LOWER EXTREMITY EDEMA: 1
STOOL FREQUENCY: LESS THAN DAILY
DENIES PAIN: 1
LAST BOWEL MOVEMENT: 67149
MUSCLE WEAKNESS: 1
CHANGE IN APPETITE: UNCHANGED
SUBJECTIVE PAIN PROGRESSION: UNCHANGED
DENIES PAIN: 1
PERSON REPORTING PAIN: PATIENT

## 2024-11-08 ENCOUNTER — HOME CARE VISIT (OUTPATIENT)
Dept: HOME HEALTH SERVICES | Facility: HOME HEALTH | Age: 89
End: 2024-11-08
Payer: MEDICARE

## 2024-11-08 PROCEDURE — G0151 HHCP-SERV OF PT,EA 15 MIN: HCPCS

## 2024-11-08 ASSESSMENT — BALANCE ASSESSMENTS
IMMEDIATE STANDING BALANCE FIRST 5 SECONDS: 2 - STEADY WITHOUT WALKER OR OTHER SUPPORT
SITTING DOWN: 1 - USES ARMS OR NOT SMOOTH MOTION
SITTING BALANCE: 1 - STEADY, SAFE
ATTEMPTS TO ARISE: 2 - ABLE TO RISE, ONE ATTEMPT
TURNING 360 DEGREES STEPS: 0 - DISCONTINUOUS STEPS
NUDGED: 0 - BEGINS TO FALL
NUDGED SCORE: 0
BALANCE SCORE: 10
EYES CLOSED AT MAXIMUM POSITION NUDGED: 0 - UNSTEADY
ARISES: 2 - ABLE WITHOUT USING ARMS
STANDING BALANCE: 2 - NARROW STANCE WITHOUT SUPPORT
ARISING SCORE: 2

## 2024-11-08 ASSESSMENT — ACTIVITIES OF DAILY LIVING (ADL)
AMBULATION ASSISTANCE ON FLAT SURFACES: 1
AMBULATION_DISTANCE/DURATION_TOLERATED: HOUSEHOLD

## 2024-11-08 ASSESSMENT — GAIT ASSESSMENTS
GAIT SCORE: 8
PATH SCORE: 1
WALKING STANCE: 0 - HEELS APART
TRUNK: 0 - MARKED SWAY OR USES WALKING AID
PATH: 1 - MILD/MODERATE DEVIATION OR USES WALKING AID
TRUNK SCORE: 0
BALANCE AND GAIT SCORE: 18
INITIATION OF GAIT IMMEDIATELY AFTER GO: 1 - NO HESITANCY
STEP SYMMETRY: 1 - RIGHT AND LEFT STEP LENGTH APPEAR EQUAL
STEP CONTINUITY: 1 - STEPS APPEAR CONTINUOUS

## 2024-11-08 ASSESSMENT — ENCOUNTER SYMPTOMS
PERSON REPORTING PAIN: PATIENT
DENIES PAIN: 1

## 2024-11-11 ENCOUNTER — HOME CARE VISIT (OUTPATIENT)
Dept: HOME HEALTH SERVICES | Facility: HOME HEALTH | Age: 89
End: 2024-11-11
Payer: MEDICARE

## 2024-11-11 ENCOUNTER — APPOINTMENT (OUTPATIENT)
Dept: PRIMARY CARE | Facility: CLINIC | Age: 89
End: 2024-11-11
Payer: MEDICARE

## 2024-11-11 PROCEDURE — G0152 HHCP-SERV OF OT,EA 15 MIN: HCPCS

## 2024-11-11 PROCEDURE — G0151 HHCP-SERV OF PT,EA 15 MIN: HCPCS

## 2024-11-11 SDOH — ECONOMIC STABILITY: HOUSING INSECURITY: HOME SAFETY: NO CHANGE

## 2024-11-11 ASSESSMENT — ENCOUNTER SYMPTOMS
DENIES PAIN: 1
PERSON REPORTING PAIN: PATIENT
DENIES PAIN: 1
PERSON REPORTING PAIN: PATIENT

## 2024-11-11 ASSESSMENT — ACTIVITIES OF DAILY LIVING (ADL)
AMBULATION ASSISTANCE ON FLAT SURFACES: 1
AMBULATION_DISTANCE/DURATION_TOLERATED: HOUSEHOLD

## 2024-11-13 ENCOUNTER — HOME CARE VISIT (OUTPATIENT)
Dept: HOME HEALTH SERVICES | Facility: HOME HEALTH | Age: 89
End: 2024-11-13
Payer: MEDICARE

## 2024-11-13 ENCOUNTER — OFFICE VISIT (OUTPATIENT)
Dept: CARDIOLOGY | Facility: CLINIC | Age: 89
End: 2024-11-13
Payer: MEDICARE

## 2024-11-13 VITALS
SYSTOLIC BLOOD PRESSURE: 169 MMHG | HEART RATE: 58 BPM | HEIGHT: 58 IN | BODY MASS INDEX: 28.34 KG/M2 | TEMPERATURE: 97.8 F | DIASTOLIC BLOOD PRESSURE: 72 MMHG | WEIGHT: 135 LBS

## 2024-11-13 DIAGNOSIS — R00.1 BRADYCARDIA: ICD-10-CM

## 2024-11-13 DIAGNOSIS — I50.32 CHRONIC DIASTOLIC CONGESTIVE HEART FAILURE: ICD-10-CM

## 2024-11-13 DIAGNOSIS — E78.5 HYPERLIPIDEMIA, UNSPECIFIED HYPERLIPIDEMIA TYPE: ICD-10-CM

## 2024-11-13 DIAGNOSIS — I10 HYPERTENSION, UNSPECIFIED TYPE: ICD-10-CM

## 2024-11-13 DIAGNOSIS — I48.11 LONGSTANDING PERSISTENT ATRIAL FIBRILLATION (MULTI): Primary | ICD-10-CM

## 2024-11-13 PROCEDURE — 3077F SYST BP >= 140 MM HG: CPT | Performed by: NURSE PRACTITIONER

## 2024-11-13 PROCEDURE — 1123F ACP DISCUSS/DSCN MKR DOCD: CPT | Performed by: NURSE PRACTITIONER

## 2024-11-13 PROCEDURE — 1036F TOBACCO NON-USER: CPT | Performed by: NURSE PRACTITIONER

## 2024-11-13 PROCEDURE — 1160F RVW MEDS BY RX/DR IN RCRD: CPT | Performed by: NURSE PRACTITIONER

## 2024-11-13 PROCEDURE — 1159F MED LIST DOCD IN RCRD: CPT | Performed by: NURSE PRACTITIONER

## 2024-11-13 PROCEDURE — 1157F ADVNC CARE PLAN IN RCRD: CPT | Performed by: NURSE PRACTITIONER

## 2024-11-13 PROCEDURE — G0152 HHCP-SERV OF OT,EA 15 MIN: HCPCS

## 2024-11-13 PROCEDURE — 3078F DIAST BP <80 MM HG: CPT | Performed by: NURSE PRACTITIONER

## 2024-11-13 PROCEDURE — 99215 OFFICE O/P EST HI 40 MIN: CPT | Performed by: NURSE PRACTITIONER

## 2024-11-13 ASSESSMENT — ENCOUNTER SYMPTOMS
DENIES PAIN: 1
PERSON REPORTING PAIN: PATIENT

## 2024-11-13 NOTE — PROGRESS NOTES
"Chief Complaint:   Hospital Failure      History Of Present Illness:    Aicha Lord is a 89 y.o. female here for hospital follow up. Patient presented to hospital with melena and acute blood loss anemia. Had been ongoing for several days prior. She was syncopal and noted palpitations. Xarelto held. Hemoglobin, at admission, was 6.6, Given 2 PRBC with repeat Hgb 7.0     Rare palpitations at present. Improved since hospitalization.   Had not been syncopal since prior to hospitalization.   LE edema remains stable.     She has remained off xarelto since hospitalization.     2/15/24: Monitor showed 100% afib burden with longest pause 2.7 seconds. Avg 57bpm.     Echocardiogram 1/5/2024   1. Left ventricular systolic function is normal with a 55-60% estimated ejection fraction.   2. The left atrium is severely dilated.   3. Mildly elevated RVSP.   4. Mild aortic valve stenosis.   5. Mild aortic valve regurgitation.       Allergies:  Celecoxib, Ciprofloxacin, Penicillins, Pepcid [famotidine], Promethazine, Statins-hmg-coa reductase inhibitors, Sulfamethoxazole-trimethoprim, and Tramadol    Review of Systems  All pertinent systems have been reviewed and are negative except for what is stated in the history of present illness.    All other systems have been reviewed and are negative and noncontributory to this patient's current ailments.     Visit Vitals  /72 (BP Location: Right leg)   Pulse 58   Temp 36.6 °C (97.8 °F)   Ht 1.473 m (4' 10\")   Wt 61.2 kg (135 lb)   BMI 28.22 kg/m²   OB Status Postmenopausal   Smoking Status Never   BSA 1.58 m²     Last Labs:  CBC -  Lab Results   Component Value Date    WBC 7.5 05/28/2024    HGB 12.9 05/28/2024    HCT 41.1 05/28/2024    MCV 89 05/28/2024     05/28/2024       CMP -  Lab Results   Component Value Date    CALCIUM 10.2 05/28/2024    PHOS 3.0 09/05/2019    PROT 6.4 05/28/2024    ALBUMIN 4.0 05/28/2024    AST 20 05/28/2024    ALT 11 05/28/2024    ALKPHOS 73 " 05/28/2024    BILITOT 0.5 05/28/2024    BUN 32 (H) 05/28/2024    CREATININE 1.16 (H) 05/28/2024       LIPID PANEL -   Lab Results   Component Value Date    CHOL 163 12/20/2023    TRIG 69 12/20/2023    HDL 60.4 12/20/2023    CHHDL 2.7 12/20/2023    LDLF 97 06/19/2023    VLDL 14 12/20/2023    NHDL 103 12/20/2023       RENAL FUNCTION PANEL -   Lab Results   Component Value Date    GLUCOSE 93 05/28/2024     05/28/2024    K 4.5 05/28/2024     05/28/2024    CO2 26 05/28/2024    ANIONGAP 14 05/28/2024    BUN 32 (H) 05/28/2024    CREATININE 1.16 (H) 05/28/2024    CALCIUM 10.2 05/28/2024    PHOS 3.0 09/05/2019    ALBUMIN 4.0 05/28/2024        Lab Results   Component Value Date     (H) 01/27/2024    HGBA1C 6.4 (H) 12/20/2023       Objective   Vitals reviewed.   Constitutional:       Appearance: Healthy appearance. Not in distress. Frail.   HENT:      Right Ear: Decreased hearing noted.   Neck:      Vascular: No JVR. JVD normal.   Pulmonary:      Effort: Pulmonary effort is normal.      Breath sounds: Normal breath sounds. No wheezing. No rhonchi. No rales.   Chest:      Chest wall: Not tender to palpatation.   Cardiovascular:      PMI at left midclavicular line. Bradycardia present. Irregular rhythm. Normal S1. Normal S2.       Murmurs: There is no murmur.      No gallop.  No click. No rub.   Pulses:     Intact distal pulses.   Edema:     Peripheral edema absent.   Abdominal:      General: Bowel sounds are normal.      Palpations: Abdomen is soft.      Tenderness: There is no abdominal tenderness.   Musculoskeletal:         General: No tenderness.      Comments: Walks with walker  Skin:     General: Skin is warm and dry.   Neurological:      General: No focal deficit present.      Mental Status: Alert and oriented to person, place and time.   Psychiatric:         Attention and Perception: Attention normal.         Mood and Affect: Mood normal.       Assessment/Plan   Diagnoses and all orders for this  visit:  Longstanding persistent atrial fibrillation (CMS/HCC)  - no further xarelto. Starting eliquis 5mg BID. Does not qualify for lower dose eliquis   - off all beta blockers due to bradycardia, pause <3 seconds   - GI bleed on xarelto   Chronic diastolic congestive heart failure (CMS/HCC)  - tolerating entresto  - euvolemic on lasix  - has not needed to take additional doses of lasix   Hypertension, unspecified type  - stable   Mixed hyperlipidemia  - stable, crestor   Bradycardia  - pacemaker not indicated     I reviewed hospital records     Follow up 6 months     Outpatient Medications:  Current Outpatient Medications   Medication Instructions    acetaminophen (TYLENOL) 650 mg, oral, Every 6 hours PRN    apixaban (ELIQUIS) 5 mg, oral, 2 times daily    apixaban (ELIQUIS) 5 mg, oral, 2 times daily    cholecalciferol (Vitamin D-3) 25 MCG (1000 UT) capsule 1 capsule, Daily    fluocinolone (Synalar) 0.01 % external solution apply one application topically twice daily    furosemide (LASIX) 40 mg, Daily    ketotifen (Zaditor) 0.025 % (0.035 %) ophthalmic solution Administer into affected eye(s). 1 drop in both eyes 2 times a day as needed for itching/allergies    L.acidophilus-B.bifidum,longum (Probiotic Colon Support) 240 mg (3 billion cell) capsule 240 mg, Daily    MULTIVITAMIN ORAL 670 mcg, Daily    pantoprazole (PROTONIX) 40 mg, 2 times daily    rosuvastatin (CRESTOR) 5 mg, oral, Daily    sacubitriL-valsartan (Entresto) 24-26 mg tablet 1 tablet, oral, 2 times daily    vit A,C and E-lutein-minerals (Ocuvite with Lutein) 300 mcg-200 mg-27 mg-2 mg tablet 1 tablet, Daily       Exclusive of any other services or procedures performed, I, Caitlyn MADRID-JAZMINE, spent 40 minutes in duration for this visit today.  This time consisted of chart review, obtaining history, and/or performing the exam as documented above, as well as, documenting the clinical information for the encounter in the electronic record, discussing  treatment options, plans, and/or goals with patient, family, and/or caregiver, refilling medications, updating the electronic record, ordering medicines, lab work, imaging, referrals, and/or procedures as documented above and communicating with other [facility] professionals. I have discussed the results of laboratory, radiology, and cardiology studies with the patient and their family/caregiver.

## 2024-11-14 ENCOUNTER — HOME CARE VISIT (OUTPATIENT)
Dept: HOME HEALTH SERVICES | Facility: HOME HEALTH | Age: 89
End: 2024-11-14
Payer: MEDICARE

## 2024-11-14 PROCEDURE — G0157 HHC PT ASSISTANT EA 15: HCPCS | Mod: CQ

## 2024-11-14 SDOH — HEALTH STABILITY: PHYSICAL HEALTH: PHYSICAL EXERCISE: SUPINE

## 2024-11-14 SDOH — HEALTH STABILITY: PHYSICAL HEALTH: PHYSICAL EXERCISE: 10

## 2024-11-14 SDOH — HEALTH STABILITY: PHYSICAL HEALTH: EXERCISE ACTIVITY: B LES

## 2024-11-14 SDOH — HEALTH STABILITY: PHYSICAL HEALTH: EXERCISE ACTIVITIES SETS: 1

## 2024-11-14 SDOH — HEALTH STABILITY: PHYSICAL HEALTH: PHYSICAL EXERCISE: SEATED

## 2024-11-14 SDOH — HEALTH STABILITY: PHYSICAL HEALTH: EXERCISE TYPE: STRENGTHENING B LES

## 2024-11-14 ASSESSMENT — ACTIVITIES OF DAILY LIVING (ADL)
AMBULATION ASSISTANCE ON FLAT SURFACES: 1
AMBULATION_DISTANCE/DURATION_TOLERATED: 75 FT X 2

## 2024-11-19 ENCOUNTER — PATIENT OUTREACH (OUTPATIENT)
Dept: PRIMARY CARE | Facility: CLINIC | Age: 89
End: 2024-11-19
Payer: MEDICARE

## 2024-11-21 ENCOUNTER — HOME CARE VISIT (OUTPATIENT)
Dept: HOME HEALTH SERVICES | Facility: HOME HEALTH | Age: 89
End: 2024-11-21
Payer: MEDICARE

## 2024-11-22 ENCOUNTER — HOME CARE VISIT (OUTPATIENT)
Dept: HOME HEALTH SERVICES | Facility: HOME HEALTH | Age: 89
End: 2024-11-22
Payer: MEDICARE

## 2024-11-25 ENCOUNTER — APPOINTMENT (OUTPATIENT)
Dept: OPHTHALMOLOGY | Facility: CLINIC | Age: 89
End: 2024-11-25
Payer: MEDICARE

## 2024-11-25 DIAGNOSIS — H35.3231 EXUDATIVE AGE-RELATED MACULAR DEGENERATION OF BOTH EYES WITH ACTIVE CHOROIDAL NEOVASCULARIZATION: ICD-10-CM

## 2024-11-25 DIAGNOSIS — H35.3211 WET AGE-RELATED MACULAR DEGENERATION OF RIGHT EYE WITH ACTIVE CHOROIDAL NEOVASCULARIZATION: Primary | ICD-10-CM

## 2024-11-25 PROCEDURE — 67028 INJECTION EYE DRUG: CPT | Mod: LEFT SIDE | Performed by: OPHTHALMOLOGY

## 2024-11-25 ASSESSMENT — SLIT LAMP EXAM - LIDS
COMMENTS: NORMAL
COMMENTS: NORMAL

## 2024-11-25 ASSESSMENT — ENCOUNTER SYMPTOMS
EYES NEGATIVE: 1
MUSCULOSKELETAL NEGATIVE: 0
RESPIRATORY NEGATIVE: 0
NEUROLOGICAL NEGATIVE: 0
GASTROINTESTINAL NEGATIVE: 0
PSYCHIATRIC NEGATIVE: 0
ENDOCRINE NEGATIVE: 0
HEMATOLOGIC/LYMPHATIC NEGATIVE: 0
ALLERGIC/IMMUNOLOGIC NEGATIVE: 0
CARDIOVASCULAR NEGATIVE: 0
CONSTITUTIONAL NEGATIVE: 0

## 2024-11-25 ASSESSMENT — EXTERNAL EXAM - RIGHT EYE: OD_EXAM: NORMAL

## 2024-11-25 ASSESSMENT — VISUAL ACUITY
OD_CC: 20/70
OS_CC: 20/200
METHOD: SNELLEN - LINEAR
OS_PH_CC: 20/50
CORRECTION_TYPE: GLASSES
OD_PH_CC: 20/60

## 2024-11-25 ASSESSMENT — EXTERNAL EXAM - LEFT EYE: OS_EXAM: NORMAL

## 2024-11-25 ASSESSMENT — TONOMETRY
OD_IOP_MMHG: 16
IOP_METHOD: GOLDMANN APPLANATION
OS_IOP_MMHG: 16

## 2024-11-25 NOTE — PROGRESS NOTES
Assessment/Plan   Diagnoses and all orders for this visit:  Wet age-related macular degeneration of right eye with active choroidal neovascularization  Exudative age-related macular degeneration of both eyes with active choroidal neovascularization       today has subretinal fluid (SRF) CME PED left            DIAGNOSTIC PROCEDURE DONE    OCT DONE OD/OS            REASON FOR TEST: will help address and tailor  therapy by detecting subclinical CME SRF     Hi quality OCT  scans obtained  signal good    OCT OD - Normal Foveal Contour,  Edema, IS/OS [place] Normal  OCT OS - Normal Foveal Contour,?Edema, IS/OS [place] Normal    additional commnents:     Treatment options for CNV OS discussed, including observation, anti-VEGF injections (including Avastin, Lucentis,   Eylea, Vabysmo and  Beovu ), and  laser. Recommend anti-VEGF injections. Eylea done OS today in a sterile manner with Betadine 5% for antisepsis

## 2024-11-26 ENCOUNTER — HOME CARE VISIT (OUTPATIENT)
Dept: HOME HEALTH SERVICES | Facility: HOME HEALTH | Age: 89
End: 2024-11-26
Payer: MEDICARE

## 2024-11-26 PROCEDURE — G0157 HHC PT ASSISTANT EA 15: HCPCS | Mod: CQ

## 2024-11-26 SDOH — HEALTH STABILITY: PHYSICAL HEALTH: PHYSICAL EXERCISE: 15

## 2024-11-26 SDOH — HEALTH STABILITY: PHYSICAL HEALTH: EXERCISE ACTIVITY: B LES

## 2024-11-26 SDOH — HEALTH STABILITY: PHYSICAL HEALTH: EXERCISE ACTIVITIES SETS: 1

## 2024-11-26 SDOH — HEALTH STABILITY: PHYSICAL HEALTH: PHYSICAL EXERCISE: STANDING

## 2024-11-26 SDOH — HEALTH STABILITY: PHYSICAL HEALTH: PHYSICAL EXERCISE: SUPINE

## 2024-11-26 SDOH — HEALTH STABILITY: PHYSICAL HEALTH: EXERCISE TYPE: STRENGTHENING B LES

## 2024-11-26 SDOH — HEALTH STABILITY: PHYSICAL HEALTH: PHYSICAL EXERCISE: SEATED

## 2024-11-26 ASSESSMENT — ACTIVITIES OF DAILY LIVING (ADL)
AMBULATION_DISTANCE/DURATION_TOLERATED: UNDER 100 FT X 2
AMBULATION ASSISTANCE ON FLAT SURFACES: 1

## 2024-11-29 ENCOUNTER — HOME CARE VISIT (OUTPATIENT)
Dept: HOME HEALTH SERVICES | Facility: HOME HEALTH | Age: 89
End: 2024-11-29
Payer: MEDICARE

## 2024-11-29 NOTE — CASE COMMUNICATION
pt canceled PT session due to declines further PT sessions. pt feel shes doing well and has good knowledge of home ex program

## 2024-12-03 ENCOUNTER — HOME CARE VISIT (OUTPATIENT)
Dept: HOME HEALTH SERVICES | Facility: HOME HEALTH | Age: 89
End: 2024-12-03
Payer: MEDICARE

## 2024-12-04 ENCOUNTER — HOME CARE VISIT (OUTPATIENT)
Dept: HOME HEALTH SERVICES | Facility: HOME HEALTH | Age: 89
End: 2024-12-04
Payer: MEDICARE

## 2024-12-04 PROCEDURE — G0151 HHCP-SERV OF PT,EA 15 MIN: HCPCS

## 2024-12-04 SDOH — HEALTH STABILITY: PHYSICAL HEALTH: EXERCISE TYPE: STRENGTHENING EXS BLE

## 2024-12-04 ASSESSMENT — BALANCE ASSESSMENTS
SITTING BALANCE: 1 - STEADY, SAFE
STANDING BALANCE: 2 - NARROW STANCE WITHOUT SUPPORT
EYES CLOSED AT MAXIMUM POSITION NUDGED: 0 - UNSTEADY
TURNING 360 DEGREES STEPS: 1 - CONTINUOUS STEPS
ARISES: 1 - ABLE, USES ARMS TO HELP
ATTEMPTS TO ARISE: 2 - ABLE TO RISE, ONE ATTEMPT
SITTING DOWN: 1 - USES ARMS OR NOT SMOOTH MOTION
NUDGED SCORE: 0
ARISING SCORE: 1
BALANCE SCORE: 11
NUDGED: 0 - BEGINS TO FALL
IMMEDIATE STANDING BALANCE FIRST 5 SECONDS: 2 - STEADY WITHOUT WALKER OR OTHER SUPPORT

## 2024-12-04 ASSESSMENT — GAIT ASSESSMENTS
STEP CONTINUITY: 1 - STEPS APPEAR CONTINUOUS
BALANCE AND GAIT SCORE: 21
PATH SCORE: 2
WALKING STANCE: 1 - HEELS ALMOST TOUCHING WHILE WALKING
PATH: 2 - STRAIGHT WITHOUT WALKING AID
GAIT SCORE: 10
TRUNK: 0 - MARKED SWAY OR USES WALKING AID
INITIATION OF GAIT IMMEDIATELY AFTER GO: 1 - NO HESITANCY
TRUNK SCORE: 0
STEP SYMMETRY: 1 - RIGHT AND LEFT STEP LENGTH APPEAR EQUAL

## 2024-12-04 ASSESSMENT — ENCOUNTER SYMPTOMS
PERSON REPORTING PAIN: PATIENT
LOSS OF SENSATION IN FEET: 0
DEPRESSION: 0
DENIES PAIN: 1
OCCASIONAL FEELINGS OF UNSTEADINESS: 0

## 2024-12-04 ASSESSMENT — ACTIVITIES OF DAILY LIVING (ADL)
AMBULATION ASSISTANCE ON FLAT SURFACES: 1
AMBULATION_DISTANCE/DURATION_TOLERATED: HOUSEHOLD DISTANCES
OASIS_M1830: 02
HOME_HEALTH_OASIS: 01

## 2025-01-27 ENCOUNTER — APPOINTMENT (OUTPATIENT)
Dept: OPHTHALMOLOGY | Age: OVER 89
End: 2025-01-27
Payer: MEDICARE

## 2025-01-30 ENCOUNTER — TELEPHONE (OUTPATIENT)
Dept: CARDIOLOGY | Facility: CLINIC | Age: OVER 89
End: 2025-01-30
Payer: MEDICARE

## 2025-02-05 ENCOUNTER — APPOINTMENT (OUTPATIENT)
Dept: GASTROENTEROLOGY | Facility: CLINIC | Age: OVER 89
End: 2025-02-05
Payer: MEDICARE

## 2025-02-08 DIAGNOSIS — I50.32 CHRONIC DIASTOLIC CONGESTIVE HEART FAILURE: ICD-10-CM

## 2025-02-10 RX ORDER — SACUBITRIL AND VALSARTAN 24; 26 MG/1; MG/1
1 TABLET, FILM COATED ORAL 2 TIMES DAILY
Qty: 180 TABLET | Refills: 1 | Status: SHIPPED | OUTPATIENT
Start: 2025-02-10

## 2025-02-17 ENCOUNTER — OFFICE VISIT (OUTPATIENT)
Dept: CARDIOLOGY | Facility: CLINIC | Age: OVER 89
End: 2025-02-17
Payer: MEDICARE

## 2025-02-17 VITALS
BODY MASS INDEX: 27.5 KG/M2 | HEIGHT: 58 IN | SYSTOLIC BLOOD PRESSURE: 147 MMHG | TEMPERATURE: 97.3 F | WEIGHT: 131 LBS | DIASTOLIC BLOOD PRESSURE: 61 MMHG | HEART RATE: 61 BPM

## 2025-02-17 DIAGNOSIS — I10 HYPERTENSION, UNSPECIFIED TYPE: ICD-10-CM

## 2025-02-17 DIAGNOSIS — R00.1 BRADYCARDIA: ICD-10-CM

## 2025-02-17 DIAGNOSIS — E78.5 HYPERLIPIDEMIA, UNSPECIFIED HYPERLIPIDEMIA TYPE: ICD-10-CM

## 2025-02-17 DIAGNOSIS — I50.32 CHRONIC DIASTOLIC CONGESTIVE HEART FAILURE: Primary | ICD-10-CM

## 2025-02-17 DIAGNOSIS — I48.11 LONGSTANDING PERSISTENT ATRIAL FIBRILLATION (MULTI): ICD-10-CM

## 2025-02-17 PROCEDURE — 3078F DIAST BP <80 MM HG: CPT | Performed by: NURSE PRACTITIONER

## 2025-02-17 PROCEDURE — 1123F ACP DISCUSS/DSCN MKR DOCD: CPT | Performed by: NURSE PRACTITIONER

## 2025-02-17 PROCEDURE — 99214 OFFICE O/P EST MOD 30 MIN: CPT | Performed by: NURSE PRACTITIONER

## 2025-02-17 PROCEDURE — 1160F RVW MEDS BY RX/DR IN RCRD: CPT | Performed by: NURSE PRACTITIONER

## 2025-02-17 PROCEDURE — 1159F MED LIST DOCD IN RCRD: CPT | Performed by: NURSE PRACTITIONER

## 2025-02-17 PROCEDURE — 1157F ADVNC CARE PLAN IN RCRD: CPT | Performed by: NURSE PRACTITIONER

## 2025-02-17 PROCEDURE — 1036F TOBACCO NON-USER: CPT | Performed by: NURSE PRACTITIONER

## 2025-02-17 PROCEDURE — 3077F SYST BP >= 140 MM HG: CPT | Performed by: NURSE PRACTITIONER

## 2025-02-17 RX ORDER — PANTOPRAZOLE SODIUM 40 MG/1
TABLET, DELAYED RELEASE ORAL
COMMUNITY
Start: 2025-01-30

## 2025-02-17 NOTE — PROGRESS NOTES
"Chief Complaint:   Hospital follow up      History Of Present Illness:    Aicha Lord is a 89 y.o. female here for hospital follow up. Today she rare palpitations at present. Improved since hospitalization. Had not been syncopal since prior to hospitalization. LE edema remains stable. Weights have remained stable    She presented to hospital 1/26 with weakness, dizziness, nausea and vomiting. Chest xray showed vascular congestion. TTE completed and subsequently diuresed.     Echocardiogram: 1/5/2024  1. Left ventricular systolic function is normal with a 55-60% estimated ejection fraction.   2. The left atrium is severely dilated.   3. Mildly elevated RVSP.   4. Mild aortic valve stenosis.   5. Mild aortic valve regurgitation.     Patient presented to hospital, 10/30/24, with melena and acute blood loss anemia. Had been ongoing for several days prior. She was syncopal and noted palpitations. Xarelto held. Hemoglobin, at admission, was 6.6, Given 2 PRBC with repeat Hgb 7.0 .     2/15/24: Monitor showed 100% afib burden with longest pause 2.7 seconds. Avg 57bpm.     Echocardiogram 1/5/2024   1. Left ventricular systolic function is normal with a 55-60% estimated ejection fraction.   2. The left atrium is severely dilated.   3. Mildly elevated RVSP.   4. Mild aortic valve stenosis.   5. Mild aortic valve regurgitation.       Allergies:  Celecoxib, Ciprofloxacin, Penicillins, Pepcid [famotidine], Promethazine, Statins-hmg-coa reductase inhibitors, Sulfamethoxazole-trimethoprim, and Tramadol    Review of Systems  All pertinent systems have been reviewed and are negative except for what is stated in the history of present illness.    All other systems have been reviewed and are negative and noncontributory to this patient's current ailments.     Visit Vitals  /61 (BP Location: Right arm)   Pulse 61   Temp 36.3 °C (97.3 °F)   Ht 1.473 m (4' 10\")   Wt 59.4 kg (131 lb)   BMI 27.38 kg/m²   OB Status Postmenopausal "   Smoking Status Never   BSA 1.56 m²         Last Labs:  CBC -  Lab Results   Component Value Date    WBC 7.5 05/28/2024    HGB 12.9 05/28/2024    HCT 41.1 05/28/2024    MCV 89 05/28/2024     05/28/2024       CMP -  Lab Results   Component Value Date    CALCIUM 10.2 05/28/2024    PHOS 3.0 09/05/2019    PROT 6.4 05/28/2024    ALBUMIN 4.0 05/28/2024    AST 20 05/28/2024    ALT 11 05/28/2024    ALKPHOS 73 05/28/2024    BILITOT 0.5 05/28/2024    BUN 32 (H) 05/28/2024    CREATININE 1.16 (H) 05/28/2024       LIPID PANEL -   Lab Results   Component Value Date    CHOL 163 12/20/2023    TRIG 69 12/20/2023    HDL 60.4 12/20/2023    CHHDL 2.7 12/20/2023    LDLF 97 06/19/2023    VLDL 14 12/20/2023    NHDL 103 12/20/2023       RENAL FUNCTION PANEL -   Lab Results   Component Value Date    GLUCOSE 93 05/28/2024     05/28/2024    K 4.5 05/28/2024     05/28/2024    CO2 26 05/28/2024    ANIONGAP 14 05/28/2024    BUN 32 (H) 05/28/2024    CREATININE 1.16 (H) 05/28/2024    CALCIUM 10.2 05/28/2024    PHOS 3.0 09/05/2019    ALBUMIN 4.0 05/28/2024        Lab Results   Component Value Date     (H) 01/27/2024    HGBA1C 6.4 (H) 12/20/2023       Objective   Vitals reviewed.   Constitutional:       Appearance: Healthy appearance. Not in distress. Frail.   HENT:      Right Ear: Decreased hearing noted.   Neck:      Vascular: No JVR. JVD normal.   Pulmonary:      Effort: Pulmonary effort is normal.      Breath sounds: Normal breath sounds. No wheezing. No rhonchi. No rales.   Chest:      Chest wall: Not tender to palpatation.   Cardiovascular:      PMI at left midclavicular line. Bradycardia present. Irregular rhythm. Normal S1. Normal S2.       Murmurs: There is no murmur.      No gallop.  No click. No rub.   Pulses:     Intact distal pulses.   Edema:     Peripheral edema absent.   Abdominal:      General: Bowel sounds are normal.      Palpations: Abdomen is soft.      Tenderness: There is no abdominal tenderness.    Musculoskeletal:         General: No tenderness.      Comments: Walks with walker  Skin:     General: Skin is warm and dry.   Neurological:      General: No focal deficit present.      Mental Status: Alert and oriented to person, place and time.   Psychiatric:         Attention and Perception: Attention normal.         Mood and Affect: Mood normal.       Assessment/Plan   Diagnoses and all orders for this visit:  Chronic diastolic congestive heart failure (CMS/HCC)  - tolerating entresto  - euvolemic on lasix, weight has remained stable   - extra dose of lasix given on 2/3 for volume retention, became euvolemic post.   Longstanding persistent atrial fibrillation (CMS/HCC)  - no further xarelto. Starting eliquis 2.5mg BID (weight and age)  - off all beta blockers due to bradycardia, pause <3 seconds   - GI bleed on xarelto, denies current hematuria and melena   Hypertension, unspecified type  - stable   Mixed hyperlipidemia  - stable, crestor   Bradycardia  - pacemaker not indicated     Follow up 4 months     Outpatient Medications:  Current Outpatient Medications   Medication Instructions    acetaminophen (TYLENOL) 650 mg, oral, Every 6 hours PRN    apixaban (ELIQUIS) 5 mg, oral, 2 times daily    apixaban (ELIQUIS) 5 mg, oral, 2 times daily    cholecalciferol (Vitamin D-3) 25 MCG (1000 UT) capsule 1 capsule, Daily    Entresto 24-26 mg tablet 1 tablet, oral, 2 times daily    fluocinolone (Synalar) 0.01 % external solution apply one application topically twice daily    furosemide (LASIX) 40 mg, Daily    ketotifen (Zaditor) 0.025 % (0.035 %) ophthalmic solution Administer into affected eye(s). 1 drop in both eyes 2 times a day as needed for itching/allergies    L.acidophilus-B.bifidum,longum (Probiotic Colon Support) 240 mg (3 billion cell) capsule 240 mg, Daily    MULTIVITAMIN ORAL 670 mcg, Daily    rosuvastatin (CRESTOR) 5 mg, oral, Daily    vit A,C and E-lutein-minerals (Ocuvite with Lutein) 300 mcg-200 mg-27 mg-2  mg tablet 1 tablet, Daily       Exclusive of any other services or procedures performed, I, Caitlyn MADRID, spent 30 minutes in duration for this visit today.  This time consisted of chart review, obtaining history, and/or performing the exam as documented above, as well as, documenting the clinical information for the encounter in the electronic record, discussing treatment options, plans, and/or goals with patient, family, and/or caregiver, refilling medications, updating the electronic record, ordering medicines, lab work, imaging, referrals, and/or procedures as documented above and communicating with other [facility] professionals. I have discussed the results of laboratory, radiology, and cardiology studies with the patient and their family/caregiver.

## 2025-06-10 ENCOUNTER — OFFICE VISIT (OUTPATIENT)
Dept: CARDIOLOGY | Facility: CLINIC | Age: OVER 89
End: 2025-06-10
Payer: MEDICARE

## 2025-06-10 VITALS
SYSTOLIC BLOOD PRESSURE: 158 MMHG | HEART RATE: 58 BPM | OXYGEN SATURATION: 94 % | BODY MASS INDEX: 28.42 KG/M2 | WEIGHT: 136 LBS | DIASTOLIC BLOOD PRESSURE: 68 MMHG

## 2025-06-10 DIAGNOSIS — I48.11 LONGSTANDING PERSISTENT ATRIAL FIBRILLATION (MULTI): ICD-10-CM

## 2025-06-10 DIAGNOSIS — E78.5 HYPERLIPIDEMIA, UNSPECIFIED HYPERLIPIDEMIA TYPE: ICD-10-CM

## 2025-06-10 DIAGNOSIS — I50.32 CHRONIC DIASTOLIC CONGESTIVE HEART FAILURE: Primary | ICD-10-CM

## 2025-06-10 DIAGNOSIS — R00.1 BRADYCARDIA: ICD-10-CM

## 2025-06-10 DIAGNOSIS — I10 HYPERTENSION, UNSPECIFIED TYPE: ICD-10-CM

## 2025-06-10 PROCEDURE — 3077F SYST BP >= 140 MM HG: CPT | Performed by: NURSE PRACTITIONER

## 2025-06-10 PROCEDURE — 3078F DIAST BP <80 MM HG: CPT | Performed by: NURSE PRACTITIONER

## 2025-06-10 PROCEDURE — 1159F MED LIST DOCD IN RCRD: CPT | Performed by: NURSE PRACTITIONER

## 2025-06-10 PROCEDURE — 99212 OFFICE O/P EST SF 10 MIN: CPT | Performed by: NURSE PRACTITIONER

## 2025-06-10 PROCEDURE — 99213 OFFICE O/P EST LOW 20 MIN: CPT | Performed by: NURSE PRACTITIONER

## 2025-06-10 PROCEDURE — 1036F TOBACCO NON-USER: CPT | Performed by: NURSE PRACTITIONER

## 2025-06-10 PROCEDURE — 1160F RVW MEDS BY RX/DR IN RCRD: CPT | Performed by: NURSE PRACTITIONER

## 2025-06-10 NOTE — PROGRESS NOTES
Chief Complaint:   HFpEF     History Of Present Illness:    Aicha Lord is a 89 y.o. female here for for follow-up of heart failure with preserved ejection fraction.  The patient is tolerating guideline-directed medical therapy including SGLT2i and spironolactone and is compliant.  The patient is compliant with regular exercise, daily weights, and follows a low sodium diet. The patient has been well since their last office appointment and is not having any anginal symptoms, dyspnea on exertion, edema, or weight gain.  The patient has not been hospitalized for heart failure.  The patient is presently NYHA functional class I II III IV and is euvolemic on loop diuretics.      She presented to hospital 1/26 with weakness, dizziness, nausea and vomiting. Chest xray showed vascular congestion. TTE completed and subsequently diuresed.     Echocardiogram: 1/5/2024  1. Left ventricular systolic function is normal with a 55-60% estimated ejection fraction.   2. The left atrium is severely dilated.   3. Mildly elevated RVSP.   4. Mild aortic valve stenosis.   5. Mild aortic valve regurgitation.     Patient presented to hospital, 10/30/24, with melena and acute blood loss anemia. Had been ongoing for several days prior. She was syncopal and noted palpitations. Xarelto held. Hemoglobin, at admission, was 6.6, Given 2 PRBC with repeat Hgb 7.0 .     2/15/24: Monitor showed 100% afib burden with longest pause 2.7 seconds. Avg 57bpm.     Echocardiogram 1/5/2024   1. Left ventricular systolic function is normal with a 55-60% estimated ejection fraction.   2. The left atrium is severely dilated.   3. Mildly elevated RVSP.   4. Mild aortic valve stenosis.   5. Mild aortic valve regurgitation.       Allergies:  Celecoxib, Ciprofloxacin, Penicillins, Pepcid [famotidine], Promethazine, Statins-hmg-coa reductase inhibitors, Sulfamethoxazole-trimethoprim, and Tramadol    Review of Systems  All pertinent systems have been reviewed and  are negative except for what is stated in the history of present illness.    All other systems have been reviewed and are negative and noncontributory to this patient's current ailments.     Visit Vitals  /68 (BP Location: Right arm, Patient Position: Sitting, BP Cuff Size: Adult)   Pulse 58   Wt 61.7 kg (136 lb)   SpO2 94%   BMI 28.42 kg/m²   OB Status Postmenopausal   Smoking Status Never   BSA 1.59 m²       Last Labs:  CBC -  Lab Results   Component Value Date    WBC 7.5 05/28/2024    HGB 12.9 05/28/2024    HCT 41.1 05/28/2024    MCV 89 05/28/2024     05/28/2024       CMP -  Lab Results   Component Value Date    CALCIUM 10.2 05/28/2024    PHOS 3.0 09/05/2019    PROT 6.4 05/28/2024    ALBUMIN 4.0 05/28/2024    AST 20 05/28/2024    ALT 11 05/28/2024    ALKPHOS 73 05/28/2024    BILITOT 0.5 05/28/2024    BUN 32 (H) 05/28/2024    CREATININE 1.16 (H) 05/28/2024       LIPID PANEL -   Lab Results   Component Value Date    CHOL 163 12/20/2023    TRIG 69 12/20/2023    HDL 60.4 12/20/2023    CHHDL 2.7 12/20/2023    LDLF 97 06/19/2023    VLDL 14 12/20/2023    NHDL 103 12/20/2023       RENAL FUNCTION PANEL -   Lab Results   Component Value Date    GLUCOSE 93 05/28/2024     05/28/2024    K 4.5 05/28/2024     05/28/2024    CO2 26 05/28/2024    ANIONGAP 14 05/28/2024    BUN 32 (H) 05/28/2024    CREATININE 1.16 (H) 05/28/2024    CALCIUM 10.2 05/28/2024    PHOS 3.0 09/05/2019    ALBUMIN 4.0 05/28/2024        Lab Results   Component Value Date     (H) 01/27/2024    HGBA1C 6.4 (H) 12/20/2023       Objective   Vitals reviewed.   Constitutional:       Appearance: Healthy appearance. Not in distress.   HENT:      Right Ear: Decreased hearing noted.   Neck:      Vascular: No JVR. JVD normal.   Pulmonary:      Effort: Pulmonary effort is normal.      Breath sounds: Normal breath sounds. No wheezing. No rhonchi. No rales.   Chest:      Chest wall: Not tender to palpatation.   Cardiovascular:      PMI at  left midclavicular line. Bradycardia present. Irregular rhythm. Normal S1. Normal S2.       Murmurs: There is no murmur.      No gallop.  No click. No rub.   Pulses:     Intact distal pulses.   Edema:     Peripheral edema absent.   Abdominal:      General: Bowel sounds are normal.      Palpations: Abdomen is soft.      Tenderness: There is no abdominal tenderness.   Musculoskeletal:         General: No tenderness.      Comments: Walks with walker  Skin:     General: Skin is warm and dry.   Neurological:      General: No focal deficit present.      Mental Status: Alert and oriented to person, place and time.   Psychiatric:         Attention and Perception: Attention normal.         Mood and Affect: Mood normal.       Assessment/Plan   Diagnoses and all orders for this visit:  Chronic diastolic congestive heart failure (CMS/HCC)  - continue entresto  - remains euvolemic on lasix, weight has remained stable. Denies nocturia   Longstanding persistent atrial fibrillation (CMS/HCC)  - no further xarelto. Continue eliquis 2.5mg BID (weight and age)  - off all beta blockers due to bradycardia, pause <3 seconds   - GI bleed on xarelto, denies current hematuria and melena   Hypertension, unspecified type  - stable   - mildly elevated in office  - continue entresto  Mixed hyperlipidemia  - stable, crestor   Bradycardia  - pacemaker not indicated     Follow up 6 months     Outpatient Medications:  Current Outpatient Medications   Medication Instructions    acetaminophen (TYLENOL) 650 mg, oral, Every 6 hours PRN    apixaban (ELIQUIS) 2.5 mg, oral, 2 times daily    cholecalciferol (Vitamin D-3) 25 MCG (1000 UT) capsule 1 capsule, Daily    Entresto 24-26 mg tablet 1 tablet, oral, 2 times daily    fluocinolone (Synalar) 0.01 % external solution apply one application topically twice daily    furosemide (LASIX) 40 mg, Daily    ketotifen (Zaditor) 0.025 % (0.035 %) ophthalmic solution Administer into affected eye(s). 1 drop in both  eyes 2 times a day as needed for itching/allergies    L.acidophilus-B.bifidum,longum (Probiotic Colon Support) 240 mg (3 billion cell) capsule 240 mg, Daily    MULTIVITAMIN ORAL 670 mcg, Daily    pantoprazole (ProtoNix) 40 mg EC tablet     rosuvastatin (CRESTOR) 5 mg, oral, Daily    vit A,C and E-lutein-minerals (Ocuvite with Lutein) 300 mcg-200 mg-27 mg-2 mg tablet 1 tablet, Daily       Exclusive of any other services or procedures performed, ICaitlyn, spent 22 minutes in duration for this visit today.  This time consisted of chart review, obtaining history, and/or performing the exam as documented above, as well as, documenting clinical information for the encounter in the electronic record. In addition to the history, testing, notes, and labs I have noted above; I have reviewed labs 1/28/25

## 2025-07-06 DIAGNOSIS — I50.32 CHRONIC DIASTOLIC CONGESTIVE HEART FAILURE: ICD-10-CM

## 2025-07-07 RX ORDER — SACUBITRIL AND VALSARTAN 24; 26 MG/1; MG/1
1 TABLET, FILM COATED ORAL 2 TIMES DAILY
Qty: 180 TABLET | Refills: 3 | Status: SHIPPED | OUTPATIENT
Start: 2025-07-07

## 2025-12-15 ENCOUNTER — APPOINTMENT (OUTPATIENT)
Dept: CARDIOLOGY | Facility: CLINIC | Age: OVER 89
End: 2025-12-15
Payer: MEDICARE